# Patient Record
Sex: FEMALE | Race: WHITE | NOT HISPANIC OR LATINO | Employment: FULL TIME | URBAN - NONMETROPOLITAN AREA
[De-identification: names, ages, dates, MRNs, and addresses within clinical notes are randomized per-mention and may not be internally consistent; named-entity substitution may affect disease eponyms.]

---

## 2017-02-21 ENCOUNTER — OFFICE VISIT (OUTPATIENT)
Dept: FAMILY MEDICINE | Facility: OTHER | Age: 52
End: 2017-02-21
Payer: COMMERCIAL

## 2017-02-21 VITALS
WEIGHT: 194.8 LBS | BODY MASS INDEX: 31.31 KG/M2 | SYSTOLIC BLOOD PRESSURE: 118 MMHG | OXYGEN SATURATION: 96 % | DIASTOLIC BLOOD PRESSURE: 68 MMHG | HEIGHT: 66 IN | RESPIRATION RATE: 16 BRPM | TEMPERATURE: 98 F | HEART RATE: 84 BPM

## 2017-02-21 DIAGNOSIS — J40 BRONCHITIS: Primary | ICD-10-CM

## 2017-02-21 PROCEDURE — 99213 OFFICE O/P EST LOW 20 MIN: CPT | Performed by: FAMILY MEDICINE

## 2017-02-21 RX ORDER — AMOXICILLIN 500 MG/1
500 CAPSULE ORAL 3 TIMES DAILY
Qty: 30 CAPSULE | Refills: 0 | Status: SHIPPED | OUTPATIENT
Start: 2017-02-21 | End: 2017-04-28

## 2017-02-21 ASSESSMENT — PAIN SCALES - GENERAL: PAINLEVEL: MODERATE PAIN (4)

## 2017-02-21 NOTE — NURSING NOTE
"Chief Complaint   Patient presents with     Sinus Problem       Initial /68 (BP Location: Left arm, Patient Position: Chair, Cuff Size: Adult Large)  Pulse 84  Temp 98  F (36.7  C) (Temporal)  Resp 16  Ht 5' 6\" (1.676 m)  Wt 194 lb 12.8 oz (88.4 kg)  SpO2 96%  BMI 31.44 kg/m2 Estimated body mass index is 31.44 kg/(m^2) as calculated from the following:    Height as of this encounter: 5' 6\" (1.676 m).    Weight as of this encounter: 194 lb 12.8 oz (88.4 kg).  Medication Reconciliation: complete   SUBJECTIVE:                                                    Brittnee Herron is a 51 year old female who presents to clinic today for the following health issues:    Acute Illness   Acute illness concerns: Sinus   Onset: 4 days    Fever: no    Chills/Sweats: YES    Headache (location?): YES    Sinus Pressure:YES    Conjunctivitis:  YES: both    Ear Pain: no    Rhinorrhea: YES    Congestion: YES    Sore Throat: YES     Cough: YES    Wheeze: no    Decreased Appetite: yes    Nausea: YES    Vomiting: no    Diarrhea:  no    Dysuria/Freq.: no    Fatigue/Achiness: YES    Sick/Strep Exposure: YES     Therapies Tried and outcome: Sudafed      Problem list and histories reviewed & adjusted, as indicated.    ENT/MOUTH: rhinorrhea-clear, sinus pressure and sore throat  RESP:cough-productive  CV: NEGATIVE for chest pain, palpitations or peripheral edema  MUSCULOSKELETAL: myalgia    OBJECTIVE:                                                    /68 (BP Location: Left arm, Patient Position: Chair, Cuff Size: Adult Large)  Pulse 84  Temp 98  F (36.7  C) (Temporal)  Resp 16  Ht 5' 6\" (1.676 m)  Wt 194 lb 12.8 oz (88.4 kg)  SpO2 96%  BMI 31.44 kg/m2  Body mass index is 31.44 kg/(m^2).    See dictated note     ASSESSMENT/PLAN:                                                    bronchitis    Ho Way MD, MD  [facility]            "

## 2017-02-21 NOTE — PROGRESS NOTES
"Nursing Notes:   Kirsty Christie LPN  2/21/2017  3:50 PM  Incomplete  Chief Complaint   Patient presents with     Sinus Problem       Initial /68 (BP Location: Left arm, Patient Position: Chair, Cuff Size: Adult Large)  Pulse 84  Temp 98  F (36.7  C) (Temporal)  Resp 16  Ht 5' 6\" (1.676 m)  Wt 194 lb 12.8 oz (88.4 kg)  SpO2 96%  BMI 31.44 kg/m2 Estimated body mass index is 31.44 kg/(m^2) as calculated from the following:    Height as of this encounter: 5' 6\" (1.676 m).    Weight as of this encounter: 194 lb 12.8 oz (88.4 kg).  Medication Reconciliation: complete   SUBJECTIVE:                                                    Brittnee Herron is a 51 year old female who presents to clinic today for the following health issues:    Acute Illness   Acute illness concerns: Sinus   Onset: 4 days    Fever: no    Chills/Sweats: YES    Headache (location?): YES    Sinus Pressure:YES    Conjunctivitis:  YES: both    Ear Pain: no    Rhinorrhea: YES    Congestion: YES    Sore Throat: YES     Cough: YES    Wheeze: no    Decreased Appetite: yes    Nausea: YES    Vomiting: no    Diarrhea:  no    Dysuria/Freq.: no    Fatigue/Achiness: YES    Sick/Strep Exposure: YES     Therapies Tried and outcome: Sudafed      Problem list and histories reviewed & adjusted, as indicated.    ENT/MOUTH: rhinorrhea-clear, sinus pressure and sore throat  RESP:cough-productive  CV: NEGATIVE for chest pain, palpitations or peripheral edema  MUSCULOSKELETAL: myalgia    OBJECTIVE:                                                    /68 (BP Location: Left arm, Patient Position: Chair, Cuff Size: Adult Large)  Pulse 84  Temp 98  F (36.7  C) (Temporal)  Resp 16  Ht 5' 6\" (1.676 m)  Wt 194 lb 12.8 oz (88.4 kg)  SpO2 96%  BMI 31.44 kg/m2  Body mass index is 31.44 kg/(m^2).    See dictated note     ASSESSMENT/PLAN:                                                    bronchitis    Ho Way MD, MD  [facility]          "

## 2017-02-21 NOTE — PROGRESS NOTES
SUBJECTIVE:  Ms. Eliseo Otoole is a 51-year-old woman, comes in having been ill for about 4 days with chills, body aches, headache, runny nose, eye irritation, some cough and mild sore throat.  She is rarely ill, but says she gets this about once a year.  Her  has just been in with similar symptoms and was treated with amoxicillin with almost immediate improvement.  She is wondering if she has a sinus infection.      OBJECTIVE:   HEENT:  Ears are free of wax and appear normal.  Throat does not look red or inflamed.  There is no adenopathy.   LUNGS:  Sound quite clear.      ASSESSMENT AND PLAN:  This would appear to be an upper respiratory bronchitis problem.  The question is whether it is bacterial or viral.  She rarely gets antibiotics.  Her  has improved with amoxicillin.  We will treat her with amoxicillin, anticipating improvement.  She may want to get some over-the-counter Mucinex.         GORDO WU M.D.             D: 2017 16:14   T: 2017 17:48   MT: LISA#[id number]      Name:     ELISEO OTOOLE   MRN:      [id number]        Account:      [id number]   :      1965           Visit Date:   2017      Document: [id number]

## 2017-02-21 NOTE — MR AVS SNAPSHOT
"              After Visit Summary   2/21/2017    Brittnee Herron    MRN: [id number]           Patient Information     Date Of Birth          1965        Visit Information        Provider Department      2/21/2017 3:15 PM Ho Way MD [facility]        Today's Diagnoses     Bronchitis    -  1       Follow-ups after your visit        Your next 10 appointments already scheduled     Feb 27, 2017  3:45 PM CST   PHYSICAL with Ho Way MD   [facility] ([facility])    [address] [facility] [address] [phone]              Who to contact     If you have questions or need follow up information about today's clinic visit or your schedule please contact [facility] directly at [phone].  Normal or non-critical lab and imaging results will be communicated to you by MyChart, letter or phone within 4 business days after the clinic has received the results. If you do not hear from us within 7 days, please contact the clinic through MyChart or phone. If you have a critical or abnormal lab result, we will notify you by phone as soon as possible.  Submit refill requests through [employer] or call your pharmacy and they will forward the refill request to us. Please allow 3 business days for your refill to be completed.          Additional Information About Your Visit        MyChart Information     [employer] lets you send messages to your doctor, view your test results, renew your prescriptions, schedule appointments and more. To sign up, go to [email or web address] . Click on \"Log in\" on the left side of the screen, which will take you to the Welcome page. Then click on \"Sign up Now\" on the right side of the page.     You will be asked to enter the access code listed below, as well as some personal information. Please follow the directions to create your username and password.     Your access code is: [email or web address]  Expires: 5/22/2017  " "4:08 PM     Your access code will  in 90 days. If you need help or a new code, please call your [facility] or [phone].        Care EveryWhere ID     This is your Care EveryWhere ID. This could be used by other organizations to access your [place] medical records  [id number]        Your Vitals Were     Pulse Temperature Respirations Height Pulse Oximetry BMI (Body Mass Index)    84 98  F (36.7  C) (Temporal) 16 5' 6\" (1.676 m) 96% 31.44 kg/m2       Blood Pressure from Last 3 Encounters:   17 118/68   16 126/88   12/28/15 136/84    Weight from Last 3 Encounters:   17 194 lb 12.8 oz (88.4 kg)   16 202 lb 3.2 oz (91.7 kg)   12/28/15 202 lb 4.8 oz (91.8 kg)              Today, you had the following     No orders found for display         Today's Medication Changes          These changes are accurate as of: 17  4:08 PM.  If you have any questions, ask your nurse or doctor.               Start taking these medicines.        Dose/Directions    amoxicillin 500 MG capsule   Commonly known as:  AMOXIL   Used for:  Bronchitis   Started by:  Ho Way MD        Dose:  500 mg   Take 1 capsule (500 mg) by mouth 3 times daily   Quantity:  30 capsule   Refills:  0            Where to get your medicines      These medications were sent to [facility] [address]   [address] [facility] [address]     Phone:  [phone]     amoxicillin 500 MG capsule                Primary Care Provider Office Phone # Fax #    Ho Way [phone]       [facility] [address] [facility] [address]        Thank you!     Thank you for choosing [facility]  for your care. Our goal is always to provide you with excellent care. Hearing back from our patients is one way we can continue to improve our services. Please take a few minutes to complete the written survey that you may receive in the mail after " your visit with us. Thank you!             Your Updated Medication List - Protect others around you: Learn how to safely use, store and throw away your medicines at www.disposemymeds.org.          This list is accurate as of: 2/21/17  4:08 PM.  Always use your most recent med list.                   Brand Name Dispense Instructions for use    amoxicillin 500 MG capsule    AMOXIL    30 capsule    Take 1 capsule (500 mg) by mouth 3 times daily       citalopram 20 MG tablet    celeXA    90 tablet    Take 1 tablet (20 mg) by mouth daily

## 2017-04-28 ENCOUNTER — OFFICE VISIT (OUTPATIENT)
Dept: FAMILY MEDICINE | Facility: OTHER | Age: 52
End: 2017-04-28
Payer: COMMERCIAL

## 2017-04-28 ENCOUNTER — HOSPITAL ENCOUNTER (OUTPATIENT)
Dept: ULTRASOUND IMAGING | Facility: CLINIC | Age: 52
Discharge: HOME | End: 2017-04-28
Attending: FAMILY MEDICINE | Admitting: FAMILY MEDICINE
Payer: COMMERCIAL

## 2017-04-28 VITALS
RESPIRATION RATE: 20 BRPM | HEART RATE: 64 BPM | WEIGHT: 193.5 LBS | DIASTOLIC BLOOD PRESSURE: 86 MMHG | TEMPERATURE: 98.5 F | BODY MASS INDEX: 31.23 KG/M2 | SYSTOLIC BLOOD PRESSURE: 130 MMHG

## 2017-04-28 DIAGNOSIS — F33.0 MILD RECURRENT MAJOR DEPRESSION (H): Primary | ICD-10-CM

## 2017-04-28 DIAGNOSIS — M79.662 PAIN OF LEFT CALF: ICD-10-CM

## 2017-04-28 PROCEDURE — 93971 EXTREMITY STUDY: CPT | Mod: LT

## 2017-04-28 PROCEDURE — 99213 OFFICE O/P EST LOW 20 MIN: CPT | Performed by: FAMILY MEDICINE

## 2017-04-28 ASSESSMENT — PAIN SCALES - GENERAL: PAINLEVEL: EXTREME PAIN (9)

## 2017-04-28 NOTE — PROGRESS NOTES
SUBJECTIVE:                                                    Brittnee Herron is a 51 year old female who presents to clinic today for the following health issues:      Leg pain     Onset: 1 week    Description:   Location: pain going from lower leg to upper leg  Character: Cramping    Intensity: severe    Progression of Symptoms: worse    Accompanying Signs & Symptoms:  Other symptoms: radiation of pain to lower leg to upper leg, weakness with walking, warmth and swelling   History:   Previous similar pain: no       Precipitating factors:   Trauma or overuse: YES- fall last summer    Alleviating factors:  Improved by: nothing       Therapies Tried and outcome: Heat, stretching, ice and no relief.         Problem list and histories reviewed & adjusted, as indicated.  Additional history: She denies any injury, prior DVT, prolonged travel or immobilization.     Patient Active Problem List   Diagnosis     Rhinitis, allergic seasonal     Mild recurrent major depression (H)     Past Surgical History:   Procedure Laterality Date     C LIGATE FALLOPIAN TUBE,POSTPARTUM  08/03/2002    Postpartum bilateral tubal ligation with partial salpingectomy through a mini laparotomy     HC REMOVAL OF OVARIAN CYST(S)  1990    laparoscopic     HC REMOVE TONSILS/ADENOIDS,<13 Y/O         Social History   Substance Use Topics     Smoking status: Never Smoker     Smokeless tobacco: Never Used     Alcohol use No     Family History   Problem Relation Age of Onset     Arthritis Mother      Hypertension Mother      Alcohol/Drug Maternal Grandmother      alcohol     Alcohol/Drug Maternal Grandfather      alcohol     DIABETES Maternal Grandfather      adult-onset     Depression Brother          Current Outpatient Prescriptions   Medication Sig Dispense Refill     citalopram (CELEXA) 20 MG tablet Take 1 tablet (20 mg) by mouth daily 90 tablet 1     Allergies   Allergen Reactions     No Known Drug Allergies      Recent Labs   Lab Test   07/14/15   0947  06/03/13   1731  04/21/12   1147   LDL  113  80   --    HDL  67   --    --    TRIG  86   --    --    ALT  28   --   22   CR  0.58   --   0.57   GFRESTIMATED  >90  Non  GFR Calc     --   >90   GFRESTBLACK  >90   GFR Calc     --   >90   POTASSIUM  4.1   --   3.7   TSH  4.60*   --    --       BP Readings from Last 3 Encounters:   04/28/17 130/90   02/21/17 118/68   09/19/16 126/88    Wt Readings from Last 3 Encounters:   04/28/17 193 lb 8 oz (87.8 kg)   02/21/17 194 lb 12.8 oz (88.4 kg)   09/19/16 202 lb 3.2 oz (91.7 kg)                  Labs reviewed in EPIC    Reviewed and updated as needed this visit by clinical staff  Tobacco  Meds       Reviewed and updated as needed this visit by Provider         ROS:  C: NEGATIVE for fever, chills, change in weight  E/M: NEGATIVE for ear, mouth and throat problems  R: NEGATIVE for significant cough or SOB  CV: NEGATIVE for chest pain, palpitations or peripheral edema  MUSCULOSKELETAL: POSITIVE  for pain left calf and popliteal carli and muscle spasm same and NEGATIVE for Hx DVT, joint swelling and joint warmth  ROS otherwise negative    OBJECTIVE:                                                    /90 (BP Location: Left arm, Patient Position: Chair, Cuff Size: Adult Large)  Pulse 64  Temp 98.5  F (36.9  C) (Tympanic)  Resp 20  Wt 193 lb 8 oz (87.8 kg)  LMP 04/21/2017  BMI 31.23 kg/m2  Body mass index is 31.23 kg/(m^2).  GENERAL: healthy, alert and no distress  NECK: no adenopathy, no asymmetry, masses, or scars and thyroid normal to palpation  RESP: lungs clear to auscultation - no rales, rhonchi or wheezes  CV: regular rate and rhythm, normal S1 S2, no S3 or S4, no murmur, click or rub, no peripheral edema and peripheral pulses strong  ABDOMEN: soft, nontender, no hepatosplenomegaly, no masses and bowel sounds normal  MS: LLE exam shows normal strength and muscle mass, no deformities, no erythema, induration, or  nodules, no evidence of joint effusion, ROM of all joints is normal and tenderness of popliteal fossa without mass. Equivocal Zuly sign.  Diagnostic Test Results:  Xray - US LLE.     ASSESSMENT/PLAN:                                                      1. Mild recurrent major depression (H)  Chronic controlled. The current medical regimen is effective;  continue present plan and medications.    2. Pain of left calf  Most likely calf strain vs DVT. Will obtain LLE US to exclude DVT. If negative then will refer to PT. If positive will initiate anticoagulation with Xarelto.  - US Lower Extremity Venous Duplex Left; Future      Judson Laird MD  [facility]

## 2017-04-28 NOTE — NURSING NOTE
"Chief Complaint   Patient presents with     Musculoskeletal Problem      left leg pain for x 1 week       Initial /90 (BP Location: Left arm, Patient Position: Chair, Cuff Size: Adult Large)  Pulse 64  Temp 98.5  F (36.9  C) (Tympanic)  Resp 20  Wt 193 lb 8 oz (87.8 kg)  LMP 04/21/2017  BMI 31.23 kg/m2 Estimated body mass index is 31.23 kg/(m^2) as calculated from the following:    Height as of 2/21/17: 5' 6\" (1.676 m).    Weight as of this encounter: 193 lb 8 oz (87.8 kg).  Medication Reconciliation: complete     Alannah Burrell MA 4/28/2017        "

## 2017-04-28 NOTE — MR AVS SNAPSHOT
"              After Visit Summary   4/28/2017    Brittnee Herron    MRN: [id number]           Patient Information     Date Of Birth          1965        Visit Information        Provider Department      4/28/2017 4:10 PM Judson Laird MD [facility]        Today's Diagnoses     Mild recurrent major depression (H)    -  1    Pain of left calf           Follow-ups after your visit        Follow-up notes from your care team     Return if symptoms worsen or fail to improve.      Future tests that were ordered for you today     Open Future Orders        Priority Expected Expires Ordered    US Lower Extremity Venous Duplex Left Routine  4/28/2018 4/28/2017            Who to contact     If you have questions or need follow up information about today's clinic visit or your schedule please contact [facility] directly at [phone].  Normal or non-critical lab and imaging results will be communicated to you by MyChart, letter or phone within 4 business days after the clinic has received the results. If you do not hear from us within 7 days, please contact the clinic through MyChart or phone. If you have a critical or abnormal lab result, we will notify you by phone as soon as possible.  Submit refill requests through [employer] or call your pharmacy and they will forward the refill request to us. Please allow 3 business days for your refill to be completed.          Additional Information About Your Visit        MyChart Information     [employer] lets you send messages to your doctor, view your test results, renew your prescriptions, schedule appointments and more. To sign up, go to [email or web address] . Click on \"Log in\" on the left side of the screen, which will take you to the Welcome page. Then click on \"Sign up Now\" on the right side of the page.     You will be asked to enter the access code listed below, as well as some personal information. Please follow the directions to create your " username and password.     Your access code is: [email or web address]  Expires: 2017  5:08 PM     Your access code will  in 90 days. If you need help or a new code, please call your [facility] or [phone].        Care EveryWhere ID     This is your Care EveryWhere ID. This could be used by other organizations to access your [place] medical records  [id number]        Your Vitals Were     Pulse Temperature Respirations Last Period BMI (Body Mass Index)       64 98.5  F (36.9  C) (Tympanic) 20 2017 31.23 kg/m2        Blood Pressure from Last 3 Encounters:   17 130/90   17 118/68   16 126/88    Weight from Last 3 Encounters:   17 193 lb 8 oz (87.8 kg)   17 194 lb 12.8 oz (88.4 kg)   16 202 lb 3.2 oz (91.7 kg)               Primary Care Provider Office Phone # Fax #    Ho Way [phone]       [facility] [address] [facility] [address]        Thank you!     Thank you for choosing [facility]  for your care. Our goal is always to provide you with excellent care. Hearing back from our patients is one way we can continue to improve our services. Please take a few minutes to complete the written survey that you may receive in the mail after your visit with us. Thank you!             Your Updated Medication List - Protect others around you: Learn how to safely use, store and throw away your medicines at www.disposemymeds.org.          This list is accurate as of: 17  4:45 PM.  Always use your most recent med list.                   Brand Name Dispense Instructions for use    citalopram 20 MG tablet    celeXA    90 tablet    Take 1 tablet (20 mg) by mouth daily

## 2017-05-09 ENCOUNTER — TELEPHONE (OUTPATIENT)
Dept: FAMILY MEDICINE | Facility: OTHER | Age: 52
End: 2017-05-09

## 2017-05-09 NOTE — TELEPHONE ENCOUNTER
Panel Management Review      Patient has the following on her problem list:     Depression / Dysthymia review  PHQ-9 SCORE 7/13/2015 12/28/2015 9/19/2016   Total Score 13 - -   Total Score - 4 4      Patient is due for:  PHQ9      Composite cancer screening  Chart review shows that this patient is due/due soon for the following Mammogram and Colonoscopy  Summary:    Patient is due/failing the following:   MAMMOGRAM, PAP and PHQ9    Action needed:   Patient needs to do PHQ9.    Type of outreach:    Phone, left message for patient to call back.     Questions for provider review:    None                                                                                                                                    Nava Spence MA     5/9/2017       Chart routed to none   .

## 2017-05-16 ENCOUNTER — HOSPITAL ENCOUNTER (OUTPATIENT)
Dept: PHYSICAL THERAPY | Facility: OTHER | Age: 52
Setting detail: THERAPIES SERIES
End: 2017-05-16
Attending: FAMILY MEDICINE
Payer: COMMERCIAL

## 2017-05-16 PROCEDURE — 97161 PT EVAL LOW COMPLEX 20 MIN: CPT | Mod: GP | Performed by: PHYSICAL THERAPIST

## 2017-05-16 PROCEDURE — 97110 THERAPEUTIC EXERCISES: CPT | Mod: GP | Performed by: PHYSICAL THERAPIST

## 2017-05-16 PROCEDURE — 40000718 ZZHC STATISTIC PT DEPARTMENT ORTHO VISIT: Performed by: PHYSICAL THERAPIST

## 2017-05-16 NOTE — PROGRESS NOTES
05/16/17 1100   General Information   Type of Visit Initial OP Ortho PT Evaluation   Start of Care Date 05/16/17   Referring Physician arlette fraser MD   Patient/Family Goals Statement calf cramps    Orders Evaluate and Treat   Date of Order 04/28/17   Insurance Type (medica )   Medical Diagnosis pain of left calf M79.662   Surgical/Medical history reviewed Yes   Precautions/Limitations no known precautions/limitations   Weight-Bearing Status - LLE full weight-bearing   Weight-Bearing Status - RLE full weight-bearing   Body Part(s)   Body Part(s) Knee   Presentation and Etiology   Pertinent history of current problem (include personal factors and/or comorbidities that impact the POC) patient reports that about 1 month ago started to have cramps in the left calf and has swelling in right knee . has US which is normal . gets yovani horses alot but this time is not going away . PMH none reported , works in kitchen as cook x 4 years . normally walks down driveway 30 minutes last time last week , feels spasm at night    Impairments A. Pain   Symptom Location left knee and calf    Onset date of current episode/exacerbation 04/16/17   Chronicity New   Pain rating (0-10 point scale) Best (/10);Worst (/10)   Best (/10) 5/10   Worst (/10) 7/10   Pain quality A. Sharp;B. Dull;C. Aching   Frequency of pain/symptoms A. Constant   Pain/symptoms are: The same all the time   Pain/symptoms exacerbated by (overdoing walking )   Pain/symptoms eased by H. Cold   Progression of symptoms since onset: Improved   Current / Previous Interventions   Diagnostic Tests: (ultrasound)   Prior Level of Function   Functional Level Prior Comment not alot , just walking    Current Level of Function   Current Community Support Family/friend caregiver   Patient role/employment history A. Employed   Fall Risk Screen   Fall screen completed by PT   Per patient - Fall 2 or more times in past year? No   Per patient - Fall with injury in past year? No    Is patient a fall risk? No   Knee Objective Findings   Side (if bilateral, select both right and left) Left   Observation no significant swelling noted   Knee ROM Comment full AROM    Knee/Hip Strength Comments 4/5   Anterior Drawer Test neg   Posterior Drawer Test neg   Varus Stress Test neg   Valgus Stress Test neg   Pema's Test neg   Knee Special Test Comments tender on hamsting insertion and calf    Left Gastrocnemius Flexibility left 0 right 10   Left Hamstring Flexibility left 20 right 15   Planned Therapy Interventions   Planned Therapy Interventions ROM;strengthening;stretching   Clinical Impression   Criteria for Skilled Therapeutic Interventions Met yes, treatment indicated   PT Diagnosis left calf and knee strain    Functional limitations due to impairments LEFS 30/80   Clinical Presentation Stable/Uncomplicated   Clinical Decision Making (Complexity) Low complexity   Predicted Duration of Therapy Intervention (days/wks) recheck in 2 weeks if doing well continue on HEP    Risk & Benefits of therapy have been explained Yes   Patient, Family & other staff in agreement with plan of care Yes   Education Assessment   Preferred Learning Style Listening;Reading;Demonstration   Barriers to Learning No barriers   ORTHO GOALS   PT Ortho Eval Goals 1;2   Ortho Goal 1   Goal Identifier 1   Goal Description instruction in HEP and compliant with it 5 of 7 days    Target Date 06/16/17   Ortho Goal 2   Goal Identifier 2   Goal Description patient to have overall decrease pain and improved tolerance to activity currently 5-7/10 and LEFS 30/80    Target Date 06/16/17   Total Evaluation Time   Total Evaluation Time 15

## 2017-05-16 NOTE — PROGRESS NOTES
05/16/17 1100   Lower Extremity Functional Scale ( 1996 KATINA Coats: used with permission)   a. Any of your usual work, housework, or school activities. 3-A Little Bit of Difficulty   b. Your usual hobbies, recreational or sporting activities.  2-Moderate Difficulty   c. Getting into or out of the bath. 2-Moderate Difficulty   d. Walking between rooms. 4-No Difficulty   e. Putting on your shoes or socks. 3-A Little Bit of Difficulty   f. Squatting. 2-Moderate Difficulty   g. Lifting an object, like a bag of groceries from the floor.  2-Moderate Difficulty   h. Performing light activities around your home.  3-A Little Bit of Difficulty   i. Performing heavy activities around your home. 2-Moderate Difficulty   j. Getting into or out of a car. 3-A Little Bit of Difficulty   k. Walking 2 blocks. 1-Quite a Bit of Difficulty   l. Walking a mile. 0-Extreme Difficulty or Unable to Perform Activity   m. Going up or down 10 stairs (about 1 flight of stairs). 0-Extreme Difficulty or Unable to Perform Activity   n. Standing for 1 hour. 1-Quite a Bit of Difficulty   o. Sitting for 1 hour. 1-Quite a Bit of Difficulty   p. Running on even ground. 0-Extreme Difficulty or Unable to Perform Activity   q. Running on uneven ground. 0-Extreme Difficulty or Unable to Perform Activity   r. Making sharp turns while running fast. 0-Extreme Difficulty or Unable to Perform Activity   s. Hopping. 0-Extreme Difficulty or Unable to Perform Activity   t. Rolling over in bed. 1-Quite a Bit of Difficulty   SCORE:    Column Totals: /80 30

## 2017-06-06 ENCOUNTER — TELEPHONE (OUTPATIENT)
Dept: FAMILY MEDICINE | Facility: OTHER | Age: 52
End: 2017-06-06

## 2017-06-06 DIAGNOSIS — M79.662 PAIN OF LEFT LOWER LEG: Primary | ICD-10-CM

## 2017-06-06 NOTE — TELEPHONE ENCOUNTER
Reason for Call: Request for an order or referral:    Order or referral being requested: referral to Ortho    Date needed: at your convenience    Has the patient been seen by the PCP for this problem? YES    Additional comments: pt stated her leg is not getting better. Would like to see Ortho. Pt is wondering if she needs to [facility] first?    Phone number Patient can be reached at:  Home number on file [phone] (home)    Best Time:  anytime    Can we leave a detailed message on this number?  YES    Call taken on 6/6/2017 at 2:52 PM by Cassie Phoenix

## 2017-06-07 ENCOUNTER — OFFICE VISIT (OUTPATIENT)
Dept: ORTHOPEDICS | Facility: CLINIC | Age: 52
End: 2017-06-07
Attending: FAMILY MEDICINE
Payer: COMMERCIAL

## 2017-06-07 ENCOUNTER — RADIANT APPOINTMENT (OUTPATIENT)
Dept: GENERAL RADIOLOGY | Facility: CLINIC | Age: 52
End: 2017-06-07
Attending: ORTHOPAEDIC SURGERY
Payer: COMMERCIAL

## 2017-06-07 VITALS — TEMPERATURE: 98 F | BODY MASS INDEX: 31.5 KG/M2 | HEIGHT: 66 IN | WEIGHT: 196 LBS

## 2017-06-07 DIAGNOSIS — M25.562 LEFT KNEE PAIN: ICD-10-CM

## 2017-06-07 DIAGNOSIS — M65.969 SYNOVITIS OF KNEE: Primary | ICD-10-CM

## 2017-06-07 PROCEDURE — 20610 DRAIN/INJ JOINT/BURSA W/O US: CPT | Mod: LT | Performed by: ORTHOPAEDIC SURGERY

## 2017-06-07 PROCEDURE — 73562 X-RAY EXAM OF KNEE 3: CPT | Mod: TC

## 2017-06-07 PROCEDURE — 99244 OFF/OP CNSLTJ NEW/EST MOD 40: CPT | Mod: 25 | Performed by: ORTHOPAEDIC SURGERY

## 2017-06-07 RX ORDER — MELOXICAM 15 MG/1
15 TABLET ORAL DAILY
Qty: 90 TABLET | Refills: 1 | Status: SHIPPED | OUTPATIENT
Start: 2017-06-07 | End: 2017-07-03 | Stop reason: ALTCHOICE

## 2017-06-07 ASSESSMENT — PAIN SCALES - GENERAL: PAINLEVEL: MODERATE PAIN (5)

## 2017-06-07 NOTE — NURSING NOTE
"Chief Complaint   Patient presents with     Consult     left knee/leg pain per Dr. Way       Initial Temp 98  F (36.7  C)  Ht 1.676 m (5' 6\")  Wt 88.9 kg (196 lb)  BMI 31.64 kg/m2 Estimated body mass index is 31.64 kg/(m^2) as calculated from the following:    Height as of this encounter: 1.676 m (5' 6\").    Weight as of this encounter: 88.9 kg (196 lb).  Medication Reconciliation: complete    BP completed using cuff size: NA (Not Taken)    Suzy Devi MA      "

## 2017-06-07 NOTE — MR AVS SNAPSHOT
After Visit Summary   6/7/2017    Brittnee Herron    MRN: [id number]           Patient Information     Date Of Birth          1965        Visit Information        Provider Department      6/7/2017 4:10 PM Brittnee Bean MD [facility]        Today's Diagnoses     Synovitis of knee    -  1    Left knee pain          Care Instructions    Encounter Diagnoses   Name Primary?     Left knee pain      Synovitis of knee Yes     Rest, ice and elevate above heart level as needed for pain control  1. The knee is giving you more trouble in the evening.  2. Xrays look great, good bone and no degenerative joint disease  3. We have ordered an MRI for you.  Please call [phone] to schedule your MRI.  You will also need to schedule a follow up visit for the results from your MRI with Dr. Bean.  You may call us at [phone] to schedule this appointment.  Please make this appointment for at least  2-3 days after your MRI.   4. We gave you a knee brace today.  5. We will do exercises at home to start.  6. We feel that you could have synovitis which is an inflammation of the lining of the knee.  7. I ordered Mobic 15mg once a day times 2 weeks, then take as needed.  Stop this medication if you have any abdominal pain with it.  I sent this to your pharmacy.      8. We decided to do a cortisone injection would help today.  9. Follow up with Brittnee Bean MD 2 to 3 days after the MRI is done to go over the results.   Cortisone Instructions:     1. You received an injection of cortisone into your L knee today.  2. The joint(s) may be more painful for the first 1-2 days.  3. We ask you to continue to rest the joint(s) for a few more days before resuming regular activities.  4. Pain Medications you can take (as long as your primary care provider allows these meds and you do not have kidney or liver conditions):  Tylenol  Take 1000 mg by mouth every 6 hours as needed; maximum dose  4000 mg a day  Ibuprofen  600 mg every 6 hours as needed; maximum 2400 mg a day  (OK to take tylenol and ibuprofen at the same time)  5. Rest, ice and elevate as needed for pain control  6. Watch for these signs of infection: redness, swelling, drainage, warmth to touch, increased pain, or fever. Call the clinic or make an appointment to be seen if you think you have an infection.  7. If you are diabetic, make sure you keep a close eye on your blood sugars, they can get elevated with cortisone injections.   8. Sometimes it can take 1-2 weeks for it to reach its full effect.      Cortisone Injections  Cortisone is a type of steroid. It can greatly reduce swelling, redness, and irritation (inflammation) and pain. Being injected with cortisone is simple and doesn t take long. Your doctor may ask you questions about your health. Certain health conditions, such as diabetes, can be affected by cortisone.     Your pain may be relieved by a cortisone injection.   Why have a cortisone injection?  Injecting cortisone can relieve pain for anything from a sports injury to arthritis. Your doctor may suggest an injection if rest, splints, or oral medicine doesn t relieve your pain. Injecting cortisone is simpler than having surgery. And cortisone may provide the lasting pain relief that can help you get out and enjoy life again.  Getting the injection  Your doctor will start by cleaning and occasionally numbing your skin at the injection site. Next you ll be injected with local anesthetics (for short-term pain relief) and cortisone. The injection may last a few moments. A small bandage will be put over the injection site. You ll then be ready to go home.  After your injection  After being injected, make sure you don t injure the treated region. But stay active. Enjoy a walk or some other mild activity. Just be careful not to strain the region that gave you trouble.  The next day  Some people feel more pain after being injected.  This is normal, and it will go away soon. Applying ice for 20 minutes at a time to your injury may reduce the increased pain. Rest for the first day or two. You don t need to stay in bed. But avoid tasks that may strain the injured region.  If you have diabetes  Cortisone injections can cause blood sugar to be increased for several days after the injection. If you have diabetes, you should follow your blood  sugar closely during this time. Follow your regular plan for what to do when your blood sugar is elevated.     4194-5989 [employer]. [address] [place] [address]. All rights reserved. This information is not intended as a substitute for professional medical care. Always follow your healthcare professional's instructions.       Wall Squats for ACL Healing    After you regain muscle control, it s time to build strength. This helps you put full weight on your leg. For best results, warm up and stretch before starting. If your injury is recent, wait until swelling and pain decrease before doing this exercise:    Lean against a wall with your feet hip-width apart. Your feet should be about 18 inches from the wall.    Slowly slide down to a near-sitting position. Don t let your knees go past 90 degrees.    Hold for 10 seconds, then slide back up.    Repeat 5 times.     CAUTION: Do this exercise only if your healthcare provider says it s OK.     7341-3577 [employer]. [address] [place] [address]. All rights reserved. This information is not intended as a substitute for professional medical care. Always follow your healthcare professional's instructions.        Leg and Knee Exercises: Leg Raise    This exercise is designed to stretch and strengthen your knee. Before beginning, read through all the instructions. While exercising, breathe normally and use smooth movements. If you feel any pain, stop the exercise. If pain persists, call your healthcare  provider.  Caution    Don t arch your back.    Don t hunch your shoulders.     Sit on the floor with your_________ leg straight, the other bent.    Tighten the thigh muscles on the top of your straight leg. You should feel the muscles contract. Raise that leg 6-8 inches. Then lower it slowly and steadily to the floor. Relax.    Repeat ______ times.  Do ______ sets a day.    2890-0530 [employer]. [address] [place] [address]. All rights reserved. This information is not intended as a substitute for professional medical care. Always follow your healthcare professional's instructions.        Quad Set for Leg and Knee    This exercise is designed to stretch and strengthen your knee. Before beginning, read through all the instructions. While exercising, breathe normally and use smooth movements. If you feel any pain, stop the exercise. If pain persists, call your healthcare provider.  1.  Sit on the floor with one leg straight, the other bent.  2.  Flex the foot of your straight leg by pointing your toes toward you. Press the back of your knee into the floor while tightening the muscle on the top of your thigh. Hold for ______ seconds. Then relax.  3.  Repeat ______ times. Do ______ sets a day.  Caution    Don t arch your back.    Don t hunch your shoulders.    3992-5593 [employer]. [address] [place] [address]. All rights reserved. This information is not intended as a substitute for professional medical care. Always follow your healthcare professional's instructions.         [employer] and bright box may offer reliable information regarding your diagnosis and treatment plan.    THANK YOU for coming in today. If you receive a survey via [employer] or mail please let us know if there was anything you especially appreciated today or if there is any way we can improve our clinic. We appreciate your input.    GENERAL INFORMATION:  Our hours are:  Monday :     Clinic 7:30  AM-430 PM ([facility])  Tuesday:      Operating Room All Day ([facility])  Wednesday: Clinic 7:30 AM - 11:15 AM ([facility])             Clinic 1:00 PM - 4:00PM ([facility])  Thursday:     Administrative Day  Friday:          Clinic 7:30 AM - 11:15 AM ([facility])            Clinic 1:00 PM - 4:00 PM ([facility])    We are not in the office Thursdays. Therefore non- urgent calls and medical messages received on Thursday will be addressed when we are back in the office on Wednesday. Urgent matters will be reviewed and addressed by one of our partners in the office as needed.    If lab work was done today as part of your evaluation you will generally be contacted via [employer], mail, or phone with the results within 1-5 days. If there is an alarming result we will contact you by phone. Lab results come back at varying times, I generally wait until all labs are resulted before making comments on results. Please note labs are automatically released to [employer] (if you have signed up for it) once available-at times you may see these prior to my having a chance to review them as well.    If you need refills please contact your pharmacist. They will send a refill request to me to review. Please allow 3 business days for us to process all refill requests. All narcotic refills should be handled in the clinic at the time of your visit.            Follow-ups after your visit        Who to contact     If you have questions or need follow up information about today's clinic visit or your schedule please contact [facility] directly at [phone].  Normal or non-critical lab and imaging results will be communicated to you by [employer], letter or phone within 4 business days after the clinic has received the results. If you do not hear from us within 7 days, please contact the clinic through  "[employer] or phone. If you have a critical or abnormal lab result, we will notify you by phone as soon as possible.  Submit refill requests through [employer] or call your pharmacy and they will forward the refill request to us. Please allow 3 business days for your refill to be completed.          Additional Information About Your Visit        [employer] Information     [employer] lets you send messages to your doctor, view your test results, renew your prescriptions, schedule appointments and more. To sign up, go to [email or web address] . Click on \"Log in\" on the left side of the screen, which will take you to the Welcome page. Then click on \"Sign up Now\" on the right side of the page.     You will be asked to enter the access code listed below, as well as some personal information. Please follow the directions to create your username and password.     Your access code is: [email or web address]  Expires: 2017  4:39 PM     Your access code will  in 90 days. If you need help or a new code, please call your [facility] clinic or [phone].        Care EveryWhere ID     This is your Care EveryWhere ID. This could be used by other organizations to access your [facility] medical records  [id number]        Your Vitals Were     Temperature Height BMI (Body Mass Index)             98  F (36.7  C) 1.676 m (5' 6\") 31.64 kg/m2          Blood Pressure from Last 3 Encounters:   17 130/86   17 118/68   16 126/88    Weight from Last 3 Encounters:   17 88.9 kg (196 lb)   17 87.8 kg (193 lb 8 oz)   17 88.4 kg (194 lb 12.8 oz)                 Today's Medication Changes          These changes are accurate as of: 17  4:39 PM.  If you have any questions, ask your nurse or doctor.               Start taking these medicines.        Dose/Directions    meloxicam 15 MG tablet   Commonly known as:  MOBIC   Used for:  Synovitis of knee   Started by:  Brittnee Bean MD        Dose:  15 mg   Take 1 tablet " (15 mg) by mouth daily   Quantity:  90 tablet   Refills:  1            Where to get your medicines      These medications were sent to [facility] [place], [place] - [address]  [address] [facility] [address]     Phone:  [phone]     meloxicam 15 MG tablet                Primary Care Provider Office Phone # Fax #    [address] [phone]       [facility] [address] [facility] [address]        Thank you!     Thank you for choosing [facility]  for your care. Our goal is always to provide you with excellent care. Hearing back from our patients is one way we can continue to improve our services. Please take a few minutes to complete the written survey that you may receive in the mail after your visit with us. Thank you!             Your Updated Medication List - Protect others around you: Learn how to safely use, store and throw away your medicines at www.disposemymeds.org.          This list is accurate as of: 6/7/17  4:39 PM.  Always use your most recent med list.                   Brand Name Dispense Instructions for use    citalopram 20 MG tablet    celeXA    90 tablet    Take 1 tablet (20 mg) by mouth daily       meloxicam 15 MG tablet    MOBIC    90 tablet    Take 1 tablet (15 mg) by mouth daily

## 2017-06-07 NOTE — LETTER
6/7/2017       RE: Brittnee Herron  [address] [facility] [address]           Dear Colleague,    Thank you for referring your patient, Brittnee Herron, to the [facility]. Please see a copy of my visit note below.    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      She is being seen for   Chief Complaints and History of Present Illnesses   Patient presents with     Consult     left knee/leg pain per Dr. Way         History of Present Illness:   Brittnee Herron is a 51 year old female who is seen in consultation at the request of Dr Way.  History of Present illness:  Brittnee presents for evaluation of:  1.) left knee/leg  Onset: June/July of last year  Symptoms brought on by fall last summer.   Location:  left leg.    Character:  stabbing.    Progression of symptoms:  worse.    Previous similar pain: no .   Pain Level:  5/10.   Previous treatments: Heat, stretching, ice and no relief. advil  .  Currently on Blood thinners? no  Diagnosis of Diabetes? No  Fell 1 yr ago injuring left knee and has had intermittent pain since then.  Worse after standing up all day at work using a cane and sleeve.  Sharp pain, difficulties with stairs, swelling.      Patient's past medical, surgical, social and family histories reviewed.     Past Medical History:   Diagnosis Date     Depressive disorder, not elsewhere classified 1982    off meds for a year       Past Surgical History:   Procedure Laterality Date     C LIGATE FALLOPIAN TUBE,POSTPARTUM  08/03/2002    Postpartum bilateral tubal ligation with partial salpingectomy through a mini laparotomy     HC REMOVAL OF OVARIAN CYST(S)  1990    laparoscopic     HC REMOVE TONSILS/ADENOIDS,<11 Y/O         Medications:    Current Outpatient Prescriptions on File Prior to Visit:  citalopram (CELEXA) 20 MG tablet Take 1 tablet (20 mg) by mouth daily     No current  "facility-administered medications on file prior to visit.     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[employer]      Social History Main Topics     Smoking status: Never Smoker     Smokeless tobacco: Never Used     Alcohol use No     Drug use: No     Sexual activity: Yes     Partners: Male     Birth control/ protection: Surgical      Comment: PPBTL       Family History   Problem Relation Age of Onset     Arthritis Mother      Hypertension Mother      Alcohol/Drug Maternal Grandmother      alcohol     Alcohol/Drug Maternal Grandfather      alcohol     DIABETES Maternal Grandfather      adult-onset     Depression Brother        REVIEW OF SYSTEMS  10 point review systems performed otherwise negative as noted as per history of present illness.    Physical Exam:  Vitals: Temp 98  F (36.7  C)  Ht 1.676 m (5' 6\")  Wt 88.9 kg (196 lb)  BMI 31.64 kg/m2  BMI= Body mass index is 31.64 kg/(m^2).    Constitutional: healthy, alert and no acute distress   Psychiatric: mentation appears normal and affect normal/bright  NEURO: no focal deficits  RESP: Normal with easy respirations and no use of accessory muscles to breathe, no audible wheezing or retractions  CV: regular pulse  SKIN: No erythema, rashes, excoriation, or breakdown. No evidence of infection.   JOINT/EXTREMITIES:left Knee Exam: Inspection: AP/lateral alignment normal, small effusion  Tender: inferior pole patella, lateral joint line, medial joint line  Active Range of Motion: decreased flexion, full extension  Strength: normal  Special tests: normal Valgus stress test, normal Varus, negative Lachman's test    GAIT: antalgic  Lymph: no palpable lymph nodes    Diagnostic Modalities:  left knee X-ray: No fracture, dislocation and or lesion. Normal alignment.  Joint space maintained no significant arthritis. No appreciable soft tissue abnormality  Independent visualization of the images was " performed.      Impression: left knee synovitis    Plan:  All of the above pertinent physical exam and imaging modalities findings was reviewed with Brittnee.                                          CONSERVATIVE CARE:  I recommend conservative care for the patient to include NSAIDs, focused self directed physical therapy, steroid injections, activity modifications, bracing . Today I provided or dispensed Mobic prescription, brace- knee, info, hep, mri.                                        INJECTION PROCEDURE:  The patient was counseled about an  injection, including discussion of risks (including infection), contents of the injection, rationale for performing the injection, and expected benefits of the injection. The skin was prepped with alcohol and betadine and then utilizing sterile technique an injection of the left knee joint from the superior lateral approach in the supine position was performed. The injection consisted 1ml of Kenalog (40mg per 1ml) with 8ml 1% lidocaine plain. The patient tolerated the injection well, and there were no complications. The injection site was covered with a Band-Aid. The injection was performed by NIKHIL Rodriguez                                        NSAIDS RISKS:  I have prescribed an antiinflammatory medication.  We discussed that it is the same class of some the common over the counter medications (Ibuprofen, Advil, Motrin, Aleve, Naproxen, and  Naprosyn). I recommend to avoid taking these OTC's medication when taking the medication that I prescribed. This medication should be stopped if having stomach issues, bleeding, high blood pressure and/or chest pain                                                FUTURE PLAN:  On their return if they still have symptoms we will consider physical therapy, To be determined based on the test results..    Return to clinic to discuss test results, or sooner as needed for changes.  Re-x-ray on return: Vero Bean  M.D.    Again, thank you for allowing me to participate in the care of your patient.        Sincerely,              Brittnee Bean MD

## 2017-06-07 NOTE — PROGRESS NOTES
ORTHOPEDIC CONSULT      Chief Complaint: Brittnee Herron is a 51 year old female who works at a [employer] in a [place].      She is being seen for   Chief Complaints and History of Present Illnesses   Patient presents with     Consult     left knee/leg pain per Dr. Way         History of Present Illness:   Brittnee Herron is a 51 year old female who is seen in consultation at the request of Dr Way.  History of Present illness:  Brittnee presents for evaluation of:  1.) left knee/leg  Onset: June/July of last year  Symptoms brought on by fall last summer.   Location:  left leg.    Character:  stabbing.    Progression of symptoms:  worse.    Previous similar pain: no .   Pain Level:  5/10.   Previous treatments: Heat, stretching, ice and no relief. advil  .  Currently on Blood thinners? no  Diagnosis of Diabetes? No  Fell 1 yr ago injuring left knee and has had intermittent pain since then.  Worse after standing up all day at work using a cane and sleeve.  Sharp pain, difficulties with stairs, swelling.      Patient's past medical, surgical, social and family histories reviewed.     Past Medical History:   Diagnosis Date     Depressive disorder, not elsewhere classified 1982    off meds for a year       Past Surgical History:   Procedure Laterality Date     C LIGATE FALLOPIAN TUBE,POSTPARTUM  08/03/2002    Postpartum bilateral tubal ligation with partial salpingectomy through a mini laparotomy     HC REMOVAL OF OVARIAN CYST(S)  1990    laparoscopic     HC REMOVE TONSILS/ADENOIDS,<11 Y/O         Medications:    Current Outpatient Prescriptions on File Prior to Visit:  citalopram (CELEXA) 20 MG tablet Take 1 tablet (20 mg) by mouth daily     No current facility-administered medications on file prior to visit.     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      Social History Main Topics     Smoking status: Never Smoker      "Smokeless tobacco: Never Used     Alcohol use No     Drug use: No     Sexual activity: Yes     Partners: Male     Birth control/ protection: Surgical      Comment: PPBTL       Family History   Problem Relation Age of Onset     Arthritis Mother      Hypertension Mother      Alcohol/Drug Maternal Grandmother      alcohol     Alcohol/Drug Maternal Grandfather      alcohol     DIABETES Maternal Grandfather      adult-onset     Depression Brother        REVIEW OF SYSTEMS  10 point review systems performed otherwise negative as noted as per history of present illness.    Physical Exam:  Vitals: Temp 98  F (36.7  C)  Ht 1.676 m (5' 6\")  Wt 88.9 kg (196 lb)  BMI 31.64 kg/m2  BMI= Body mass index is 31.64 kg/(m^2).    Constitutional: healthy, alert and no acute distress   Psychiatric: mentation appears normal and affect normal/bright  NEURO: no focal deficits  RESP: Normal with easy respirations and no use of accessory muscles to breathe, no audible wheezing or retractions  CV: regular pulse  SKIN: No erythema, rashes, excoriation, or breakdown. No evidence of infection.   JOINT/EXTREMITIES:left Knee Exam: Inspection: AP/lateral alignment normal, small effusion  Tender: inferior pole patella, lateral joint line, medial joint line  Active Range of Motion: decreased flexion, full extension  Strength: normal  Special tests: normal Valgus stress test, normal Varus, negative Lachman's test    GAIT: antalgic  Lymph: no palpable lymph nodes    Diagnostic Modalities:  left knee X-ray: No fracture, dislocation and or lesion. Normal alignment.  Joint space maintained no significant arthritis. No appreciable soft tissue abnormality  Independent visualization of the images was performed.      Impression: left knee synovitis    Plan:  All of the above pertinent physical exam and imaging modalities findings was reviewed with Brittnee.                                          CONSERVATIVE CARE:  I recommend conservative care for the " patient to include NSAIDs, focused self directed physical therapy, steroid injections, activity modifications, bracing . Today I provided or dispensed Mobic prescription, brace- knee, info, hep, mri.                                        INJECTION PROCEDURE:  The patient was counseled about an  injection, including discussion of risks (including infection), contents of the injection, rationale for performing the injection, and expected benefits of the injection. The skin was prepped with alcohol and betadine and then utilizing sterile technique an injection of the left knee joint from the superior lateral approach in the supine position was performed. The injection consisted 1ml of Kenalog (40mg per 1ml) with 8ml 1% lidocaine plain. The patient tolerated the injection well, and there were no complications. The injection site was covered with a Band-Aid. The injection was performed by NIKHIL Rodriguez                                        NSAIDS RISKS:  I have prescribed an antiinflammatory medication.  We discussed that it is the same class of some the common over the counter medications (Ibuprofen, Advil, Motrin, Aleve, Naproxen, and  Naprosyn). I recommend to avoid taking these OTC's medication when taking the medication that I prescribed. This medication should be stopped if having stomach issues, bleeding, high blood pressure and/or chest pain                                                FUTURE PLAN:  On their return if they still have symptoms we will consider physical therapy, To be determined based on the test results..    Return to clinic to discuss test results, or sooner as needed for changes.  Re-x-ray on return: Vero Bean M.D.

## 2017-06-07 NOTE — PATIENT INSTRUCTIONS
Encounter Diagnoses   Name Primary?     Left knee pain      Synovitis of knee Yes     Rest, ice and elevate above heart level as needed for pain control  1. The knee is giving you more trouble in the evening.  2. Xrays look great, good bone and no degenerative joint disease  3. We have ordered an MRI for you.  Please call [phone] to schedule your MRI.  You will also need to schedule a follow up visit for the results from your MRI with Dr. Bean.  You may call us at [phone] to schedule this appointment.  Please make this appointment for at least  2-3 days after your MRI.   4. We gave you a knee brace today.  5. We will do exercises at home to start.  6. We feel that you could have synovitis which is an inflammation of the lining of the knee.  7. I ordered Mobic 15mg once a day times 2 weeks, then take as needed.  Stop this medication if you have any abdominal pain with it.  I sent this to your pharmacy.      8. We decided to do a cortisone injection would help today.  9. Follow up with Brittnee Bean MD 2 to 3 days after the MRI is done to go over the results.   Cortisone Instructions:     1. You received an injection of cortisone into your L knee today.  2. The joint(s) may be more painful for the first 1-2 days.  3. We ask you to continue to rest the joint(s) for a few more days before resuming regular activities.  4. Pain Medications you can take (as long as your primary care provider allows these meds and you do not have kidney or liver conditions):  Tylenol  Take 1000 mg by mouth every 6 hours as needed; maximum dose 4000 mg a day  Ibuprofen  600 mg every 6 hours as needed; maximum 2400 mg a day  (OK to take tylenol and ibuprofen at the same time)  5. Rest, ice and elevate as needed for pain control  6. Watch for these signs of infection: redness, swelling, drainage, warmth to touch, increased pain, or fever. Call the clinic or make an appointment to be seen if you think you have an  infection.  7. If you are diabetic, make sure you keep a close eye on your blood sugars, they can get elevated with cortisone injections.   8. Sometimes it can take 1-2 weeks for it to reach its full effect.      Cortisone Injections  Cortisone is a type of steroid. It can greatly reduce swelling, redness, and irritation (inflammation) and pain. Being injected with cortisone is simple and doesn t take long. Your doctor may ask you questions about your health. Certain health conditions, such as diabetes, can be affected by cortisone.     Your pain may be relieved by a cortisone injection.   Why have a cortisone injection?  Injecting cortisone can relieve pain for anything from a sports injury to arthritis. Your doctor may suggest an injection if rest, splints, or oral medicine doesn t relieve your pain. Injecting cortisone is simpler than having surgery. And cortisone may provide the lasting pain relief that can help you get out and enjoy life again.  Getting the injection  Your doctor will start by cleaning and occasionally numbing your skin at the injection site. Next you ll be injected with local anesthetics (for short-term pain relief) and cortisone. The injection may last a few moments. A small bandage will be put over the injection site. You ll then be ready to go home.  After your injection  After being injected, make sure you don t injure the treated region. But stay active. Enjoy a walk or some other mild activity. Just be careful not to strain the region that gave you trouble.  The next day  Some people feel more pain after being injected. This is normal, and it will go away soon. Applying ice for 20 minutes at a time to your injury may reduce the increased pain. Rest for the first day or two. You don t need to stay in bed. But avoid tasks that may strain the injured region.  If you have diabetes  Cortisone injections can cause blood sugar to be increased for several days after the injection. If you have  diabetes, you should follow your blood  sugar closely during this time. Follow your regular plan for what to do when your blood sugar is elevated.     0117-6477 [employer]. [address] [place] [address]. All rights reserved. This information is not intended as a substitute for professional medical care. Always follow your healthcare professional's instructions.       Wall Squats for ACL Healing    After you regain muscle control, it s time to build strength. This helps you put full weight on your leg. For best results, warm up and stretch before starting. If your injury is recent, wait until swelling and pain decrease before doing this exercise:    Lean against a wall with your feet hip-width apart. Your feet should be about 18 inches from the wall.    Slowly slide down to a near-sitting position. Don t let your knees go past 90 degrees.    Hold for 10 seconds, then slide back up.    Repeat 5 times.     CAUTION: Do this exercise only if your healthcare provider says it s OK.     2387-2503 [employer]. [address] [place] [address]. All rights reserved. This information is not intended as a substitute for professional medical care. Always follow your healthcare professional's instructions.        Leg and Knee Exercises: Leg Raise    This exercise is designed to stretch and strengthen your knee. Before beginning, read through all the instructions. While exercising, breathe normally and use smooth movements. If you feel any pain, stop the exercise. If pain persists, call your healthcare provider.  Caution    Don t arch your back.    Don t hunch your shoulders.     Sit on the floor with your_________ leg straight, the other bent.    Tighten the thigh muscles on the top of your straight leg. You should feel the muscles contract. Raise that leg 6-8 inches. Then lower it slowly and steadily to the floor. Relax.    Repeat ______ times.  Do ______ sets a day.    9710-7946  [employer]. [address] [place] [address]. All rights reserved. This information is not intended as a substitute for professional medical care. Always follow your healthcare professional's instructions.        Quad Set for Leg and Knee    This exercise is designed to stretch and strengthen your knee. Before beginning, read through all the instructions. While exercising, breathe normally and use smooth movements. If you feel any pain, stop the exercise. If pain persists, call your healthcare provider.  1.  Sit on the floor with one leg straight, the other bent.  2.  Flex the foot of your straight leg by pointing your toes toward you. Press the back of your knee into the floor while tightening the muscle on the top of your thigh. Hold for ______ seconds. Then relax.  3.  Repeat ______ times. Do ______ sets a day.  Caution    Don t arch your back.    Don t hunch your shoulders.    3442-7667 [employer]. [address] [place] [address]. All rights reserved. This information is not intended as a substitute for professional medical care. Always follow your healthcare professional's instructions.         [facility] and [facility] may offer reliable information regarding your diagnosis and treatment plan.    THANK YOU for coming in today. If you receive a survey via [employer] or mail please let us know if there was anything you especially appreciated today or if there is any way we can improve our clinic. We appreciate your input.    GENERAL INFORMATION:  Our hours are:  Monday :     Clinic 7:30 AM-430 PM ([facility])  Tuesday:      Operating Room All Day ([facility])  Wednesday: Clinic 7:30 AM - 11:15 AM ([facility])             Clinic 1:00 PM - 4:00PM ([facility])  Thursday:     Administrative Day  Friday:          Clinic 7:30 AM - 11:15 AM ([facility])             Clinic 1:00 PM - 4:00 PM ([facility])    We are not in the office Thursdays. Therefore non- urgent calls and medical messages received on Thursday will be addressed when we are back in the office on Wednesday. Urgent matters will be reviewed and addressed by one of our partners in the office as needed.    If lab work was done today as part of your evaluation you will generally be contacted via [employer], mail, or phone with the results within 1-5 days. If there is an alarming result we will contact you by phone. Lab results come back at varying times, I generally wait until all labs are resulted before making comments on results. Please note labs are automatically released to [employer] (if you have signed up for it) once available-at times you may see these prior to my having a chance to review them as well.    If you need refills please contact your pharmacist. They will send a refill request to me to review. Please allow 3 business days for us to process all refill requests. All narcotic refills should be handled in the clinic at the time of your visit.

## 2017-06-14 NOTE — PROGRESS NOTES
Outpatient Physical Therapy Discharge Note     Patient: Brittnee Herron  : 1965    Beginning/End Dates of Reporting Period:  2017 to 2017    Referring Provider: arlette fraser MD    Therapy Diagnosis: calf pain      Client Self Report:      Objective Measurements:  Objective Measure: pain 5-7/10 LEFS 30/80       Goals:  Goal Identifier 1   Goal Description instruction in HEP and compliant with it 5 of 7 days    Target Date 17   Date Met      Progress:     Goal Identifier 2   Goal Description patient to have overall decrease pain and improved tolerance to activity currently 5-7/10 and LEFS 30/80    Target Date 17   Date Met      Progress:       Progress Toward Goals:   Progress this reporting period: patient was only seen for evaluation and instruction in HEP , she cancelled her follow up on 17 and 17 and as of 2017 she has made no further contact with PT           Plan:  Discharge from therapy.    Discharge:    Reason for Discharge: Patient chooses to discontinue therapy.  Patient has not made expected progress due to interrupted treatment attendance.  Patient has failed to schedule further appointments.    Equipment Issued: none    Discharge Plan: Patient to continue home program.

## 2017-06-21 ENCOUNTER — TELEPHONE (OUTPATIENT)
Dept: ORTHOPEDICS | Facility: CLINIC | Age: 52
End: 2017-06-21

## 2017-06-22 ENCOUNTER — TELEPHONE (OUTPATIENT)
Dept: ORTHOPEDICS | Facility: CLINIC | Age: 52
End: 2017-06-22

## 2017-06-22 DIAGNOSIS — F41.9 ANXIETY: Primary | ICD-10-CM

## 2017-06-22 NOTE — TELEPHONE ENCOUNTER
Reason for Call:  Other call back    Detailed comments: Would like to have a sedative ordered for her for the MRI she will be having on Monday 6-26-17. Pharmacy [facility] in [place].    Phone Number Patient can be reached at: Cell number on file:    Telephone Information:   Mobile [phone]       Best Time: any    Can we leave a detailed message on this number? YES    Call taken on 6/22/2017 at 3:12 PM by Tonja Blanc

## 2017-06-23 RX ORDER — DIAZEPAM 2 MG
2 TABLET ORAL PRN
Qty: 4 TABLET | Refills: 0 | Status: SHIPPED | OUTPATIENT
Start: 2017-06-23 | End: 2017-07-03

## 2017-06-26 ENCOUNTER — HOSPITAL ENCOUNTER (OUTPATIENT)
Dept: MRI IMAGING | Facility: CLINIC | Age: 52
Discharge: HOME | End: 2017-06-26
Attending: PHYSICIAN ASSISTANT | Admitting: PHYSICIAN ASSISTANT
Payer: COMMERCIAL

## 2017-06-26 ENCOUNTER — TELEPHONE (OUTPATIENT)
Dept: FAMILY MEDICINE | Facility: OTHER | Age: 52
End: 2017-06-26

## 2017-06-26 DIAGNOSIS — M65.969 SYNOVITIS OF KNEE: ICD-10-CM

## 2017-06-26 PROCEDURE — 73721 MRI JNT OF LWR EXTRE W/O DYE: CPT | Mod: LT

## 2017-06-26 NOTE — TELEPHONE ENCOUNTER
Reason for Call:  Form, our goal is to have forms completed with 72 hours, however, some forms may require a visit or additional information.    Type of letter, form or note:  work             To Whom It May Concern:    RE:  Brittnee Herron was seen and treated today at our clinic.  Due to a work related injury, she is not able to return to work until she follows up with either an Occupational Therapist or her physician to be cleared to return to work.    Please contact me with any questions or concerns.    Sincerely,        Ho Way MD, MD         Who is the form from?: Brittnee has a MRI scheduled this evening and is requesting a work note that she is unable to work this week. Please call when completed  (if other please explain)    Where did the form come from:     What clinic location was the form placed at?:     Where the form was placed:     What number is listed as a contact on the form?:        Additional comments: Please call when completed     Call taken on 6/26/2017 at 9:05 AM by Leslie Sanders

## 2017-06-27 ENCOUNTER — TELEPHONE (OUTPATIENT)
Dept: ORTHOPEDICS | Facility: CLINIC | Age: 52
End: 2017-06-27

## 2017-06-27 ENCOUNTER — TELEPHONE (OUTPATIENT)
Dept: FAMILY MEDICINE | Facility: OTHER | Age: 52
End: 2017-06-27

## 2017-06-27 DIAGNOSIS — M65.969 SYNOVITIS OF KNEE: Primary | ICD-10-CM

## 2017-06-27 RX ORDER — DICLOFENAC SODIUM 75 MG/1
75 TABLET, DELAYED RELEASE ORAL 2 TIMES DAILY PRN
Qty: 30 TABLET | Refills: 1 | Status: SHIPPED | OUTPATIENT
Start: 2017-06-27 | End: 2017-08-07

## 2017-06-27 NOTE — LETTER
[address] [address]  Tel. [phone]      June 27, 2017      Brittnee Herron  [address] [facility] [address]      Dear Dr. Rayna Beaulieu and I are committed to making sure our patient s receive the best care.     Part of that commitment includes making sure you are receiving your recommended routine health screening. Dr. Way and I have noted that you are currently overdue for your mammogram, colonoscopy and PHQ9 & med check .    If you have had any of these tests at a non-[facility] clinic in the past please let us know so we can update your medical record. You can send us a message via [employer] or call the clinic at the phone number listed above.  If you have not had a mammogram, colonoscopy and PHQ9 & med check  please call the clinic and we will assist you with finding the most convenient time and location. You can also go to [email or web address].    If you are under/uninsured, we recommend you contact the [facility]. They offer both paps and mammograms at no charge or on a sliding fee charge. You can schedule with them at [phone]. Please ask them to send us the results.      Sincerely,    Dr. Way and Kirsty   [address]   [address]  Tel. [phone]  Fax [phone]

## 2017-06-27 NOTE — TELEPHONE ENCOUNTER
I called the patient this AM, to discuss her medications.  She says when she stopped the Mobic she did not notice much of a difference. I was encouraging her to use that medication.  We talked about voltaren as a different med she could take at work.  We would take this in place of the mobic.  She also wanted to know the results of her MRI, I went through that briefly but told her we would go into more detail when she comes back.  Rx was signed and sent to her pharmacy.

## 2017-06-27 NOTE — TELEPHONE ENCOUNTER
Reason for Call:  Medication or medication refill:    Do you use a [facility]?  Name of the pharmacy and phone number for the current request:  [facility] - [place] - [phone]    Name of the medication requested: Pt calling and stating that she is going to be going back to work and would like to know if you could prescribe a non-drowsy pain medication.     Other request: please call pt with any questions. Thank You Cleopatra      Can we leave a detailed message on this number? YES    Phone number patient can be reached at: Home number on file [phone] (home)    Best Time: any    Call taken on 6/27/2017 at 8:32 AM by Cleopatra Manzano

## 2017-06-27 NOTE — TELEPHONE ENCOUNTER
I called pt. We talked for sometime trying to figure out what pt is doing for pain, work etc.  I did make her a f/u appt for MRI results for 7/3/17.  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 She has pain by mid morning so is wondering it there is anything she can get for pain to help her thru this?  She was given Mobic RX, she quit taking this due to feeling she was not getting any help from this. She stopped this several days ago. She has been taking tylenol arthritis 3 tabs q 4hrs, the tabs have 650mg dose in them. I told pt she cannot take that many that she should only be taking 6 tabs daily of this.  She feels she is having more pain and swelling. She does use ice and is wearing her knee brace.  I told pt that NIKHIL Biswas was here today and would review this and I would get back to her with an answer from him about the pain meds. I told her that she could not use narcotics at work and that there may be another option but we would have to wait until I hear back from the PA. OUMOU Calderon

## 2017-06-27 NOTE — TELEPHONE ENCOUNTER
Panel Management Review          Composite cancer screening  Chart review shows that this patient is due/due soon for the following Mammogram and Colonoscopy  Summary:    Patient is due/failing the following:   COLONOSCOPY, MAMMOGRAM and PHQ9    Action needed:   Patient needs office visit for med recheck. and Patient needs to do PHQ9.    Type of outreach:    Phone, left message for patient to call back.  and Sent letter.    Questions for provider review:    None                                                                                                                                     Kirsty Christie LPN    Chart routed to none .

## 2017-07-03 ENCOUNTER — OFFICE VISIT (OUTPATIENT)
Dept: ORTHOPEDICS | Facility: CLINIC | Age: 52
End: 2017-07-03
Payer: COMMERCIAL

## 2017-07-03 ENCOUNTER — TELEPHONE (OUTPATIENT)
Dept: ORTHOPEDICS | Facility: CLINIC | Age: 52
End: 2017-07-03

## 2017-07-03 VITALS — HEIGHT: 66 IN | BODY MASS INDEX: 31.5 KG/M2 | WEIGHT: 196 LBS | TEMPERATURE: 97.4 F

## 2017-07-03 DIAGNOSIS — S83.242A TEAR OF MEDIAL MENISCUS OF LEFT KNEE, CURRENT, UNSPECIFIED TEAR TYPE, INITIAL ENCOUNTER: Primary | ICD-10-CM

## 2017-07-03 PROCEDURE — 99213 OFFICE O/P EST LOW 20 MIN: CPT | Performed by: ORTHOPAEDIC SURGERY

## 2017-07-03 ASSESSMENT — PAIN SCALES - GENERAL: PAINLEVEL: EXTREME PAIN (9)

## 2017-07-03 NOTE — MR AVS SNAPSHOT
After Visit Summary   7/3/2017    Brittnee Herron    MRN: [id number]           Patient Information     Date Of Birth          1965        Visit Information        Provider Department      7/3/2017 3:00 PM Brittnee Bean MD [facility]        Today's Diagnoses     Tear of medial meniscus of left knee, current, unspecified tear type, initial encounter    -  1      Care Instructions    Encounter Diagnosis   Name Primary?     Tear of medial meniscus of left knee, current, unspecified tear type, initial encounter Yes     Rest, ice and elevate above heart level as needed for pain control  1. We can tell from your MRI that you have a medial meniscus tear as well as synovitis and some chondromalacia which just means some irritation of the cartilage and wear and tear.  2. On exam you do seem to be very tender over the medial meniscus area.  3. We know you have tried several medications mobic, voltaren, brace, cane, cortisone injection with no relief.    4. We discussed surgery today in the form of a knee scope.   5. We did a work note for you today.  Surgery Information:   1. Today we discussed surgery, the risks, benefits and alternatives. Risks are infection, bleeding or nerve issues, anesthesia problems. All of these are very rare.  2. The surgery would be a Left Knee Arthroscopy and possible medial menisectomy.  This means trimming up the meniscus and clean up the knee.  You can put your weight on it right after surgery.    3. This surgery is a same day surgery, so you will go home the same day. Plan to have some one drive you home.  4. You will need a Pre Operative History and Physical from [facility] [address] MD, MD or another provider prior to surgery. This needs to be within 30 days prior to surgery. We can help you set this up.  5. We talked about doing the surgery on July 18th.  6. It will take about 30 minutes to do the surgery.  7. You can put weight on it right after  the surgery.  8. Follow up with Brittnee Bean MD on the day of surgery. We will talk to you prior to surgery and answer any questions, but it is ok to call prior to surgery if you any questions he wanted answered sooner.  [employer] and [employer] may offer reliable information regarding your diagnosis and treatment plan.    THANK YOU for coming in today. If you receive a survey via [employer] or mail please let us know if there was anything you especially appreciated today or if there is any way we can improve our clinic. We appreciate your input.    GENERAL INFORMATION:  Our hours are:    Monday :     Clinic 7:30 AM-430 PM ([facility])    Tuesday:      Operating Room All Day ([facility])    Wednesday: Clinic 7:30 AM - 11:15 AM ([facility])             Clinic 1:00 PM - 4:00PM ([facility])    Thursday:     Administrative Day    Friday:          Clinic 7:30 AM - 11:15 AM ([facility])            Clinic 1:00 PM - 4:00 PM ([facility])    We are not in the office Thursdays. Therefore non- urgent calls and medical messages received on Thursday will be addressed when we are back in the office on Wednesday. Urgent matters will be reviewed and addressed by one of our partners in the office as needed.    If lab work was done today as part of your evaluation you will generally be contacted via [employer], mail, or phone with the results within 1-5 days. If there is an alarming result we will contact you by phone. Lab results come back at varying times, I generally wait until all labs are resulted before making comments on results. Please note labs are automatically released to [employer] (if you have signed up for it) once available-at times you may see these prior to my having a chance to review them as well.    If you need refills please contact your pharmacist. They will send a refill request to me  to review. Please allow 3 business days for us to process all refill requests. All narcotic refills should be handled in the clinic at the time of your visit.        Understanding Meniscal Tears    The meniscus is a tough cushion of fibrous tissue called cartilage in the knee joint. It cushions the knee. It absorbs shock and helps spread weight across the knee joint. It also works with other parts of the knee to help keep the joint stable. Injury or aging can cause the meniscus to tear and lead to pain and problems using the knee.   What causes meniscal tears?  A sudden injury can tear the meniscus. This is often because of planting the foot and twisting the knee. Sports such as soccer, football, and basketball are often involved. Repeated actions, such as squatting, may also lead to a tear. Breakdown of the meniscus because of aging can also lead to tears.  Symptoms of meniscal tears  These can include:    Knee pain    Knee swelling    Catching of the knee or inability to straighten the knee    Unstable feeling in the knee  Treatment for meniscal tears  A tear is unlikely to heal on its own. You often will need surgery to repair a tear. In many cases, your healthcare provider will first try treatments to help relieve symptoms. These may include:    Rest the knee. This means avoiding any activity that puts stress on the knee joint. These include kneeling, squatting, jogging, and climbing stairs. In some cases, you may need to use crutches for a time to keep body weight off of the knee joint.    Cold pack. Putting a cold pack on the knee helps reduce pain and swelling.    Knee brace. Bracing the knee helps support it.    Medicine. Prescription and over-the-counter pain medicines can help relieve swelling and pain.    Exercises. Exercises help strengthen the muscles of the leg to help support the knee joint.  If these treatments don t help relieve symptoms or the injury is severe, you may need surgery. This can repair  "the meniscus to relieve symptoms and restore movement.     When to call your healthcare provider  Call your healthcare provider right away if you have any of these:    Fever of 100.4 F (38 C) or higher, or as directed    Pain or swelling that gets worse, including pain in the calf    Numbness or tingling in leg or foot    You suddenly can t put any weight on your leg    Your knee  locks    Date Last Reviewed: 3/10/2016    1628-8992 [employer]. [address] [place] [address]. All rights reserved. This information is not intended as a substitute for professional medical care. Always follow your healthcare professional's instructions.                Follow-ups after your visit        Follow-up notes from your care team     Return in 2 weeks (on 7/18/2017) for surgery.      Who to contact     If you have questions or need follow up information about today's clinic visit or your schedule please contact [facility] directly at [phone].  Normal or non-critical lab and imaging results will be communicated to you by MyChart, letter or phone within 4 business days after the clinic has received the results. If you do not hear from us within 7 days, please contact the clinic through [employer] or phone. If you have a critical or abnormal lab result, we will notify you by phone as soon as possible.  Submit refill requests through [employer] or call your pharmacy and they will forward the refill request to us. Please allow 3 business days for your refill to be completed.          Additional Information About Your Visit        [employer] Information     [employer] lets you send messages to your doctor, view your test results, renew your prescriptions, schedule appointments and more. To sign up, go to [email or web address] . Click on \"Log in\" on the left side of the screen, which will take you to the Welcome page. Then click on \"Sign up Now\" on the right side of the page.     You will be asked " "to enter the access code listed below, as well as some personal information. Please follow the directions to create your username and password.     Your access code is: [email or web address]  Expires: 2017  4:39 PM     Your access code will  in 90 days. If you need help or a new code, please call your [facility] clinic or [phone].        Care EveryWhere ID     This is your Care EveryWhere ID. This could be used by other organizations to access your [facility] medical records  [id number]        Your Vitals Were     Temperature Height BMI (Body Mass Index)             97.4  F (36.3  C) (Temporal) 1.676 m (5' 6\") 31.64 kg/m2          Blood Pressure from Last 3 Encounters:   17 130/86   17 118/68   16 126/88    Weight from Last 3 Encounters:   17 88.9 kg (196 lb)   17 88.9 kg (196 lb)   17 87.8 kg (193 lb 8 oz)              Today, you had the following     No orders found for display         Today's Medication Changes          These changes are accurate as of: 7/3/17  3:20 PM.  If you have any questions, ask your nurse or doctor.               Stop taking these medicines if you haven't already. Please contact your care team if you have questions.     meloxicam 15 MG tablet   Commonly known as:  MOBIC   Stopped by:  Brittnee Bean MD                    Primary Care Provider Office Phone # Fax #    [address] [phone]       [facility] [address] [facility] [address]        Equal Access to Services     [facility] AH: Hadkarin Mcneil, kya patel, qaangel gutierrez. So [facility] [phone].    ATENCIÓN: Si habla español, tiene a shine disposición servicios gratuitos de asistencia lingüística. Llame al [phone].    We comply with applicable federal civil rights laws and [place] laws. We do not discriminate on the basis of race, color, national origin, age, " disability sex, sexual orientation or gender identity.            Thank you!     Thank you for choosing [facility]  for your care. Our goal is always to provide you with excellent care. Hearing back from our patients is one way we can continue to improve our services. Please take a few minutes to complete the written survey that you may receive in the mail after your visit with us. Thank you!             Your Updated Medication List - Protect others around you: Learn how to safely use, store and throw away your medicines at www.disposemymeds.org.          This list is accurate as of: 7/3/17  3:20 PM.  Always use your most recent med list.                   Brand Name Dispense Instructions for use Diagnosis    citalopram 20 MG tablet    celeXA    90 tablet    Take 1 tablet (20 mg) by mouth daily    Mild recurrent major depression (H)       diclofenac 75 MG EC tablet    VOLTAREN    30 tablet    Take 1 tablet (75 mg) by mouth 2 times daily as needed for moderate pain    Synovitis of knee

## 2017-07-03 NOTE — TELEPHONE ENCOUNTER
Surgery Scheduled    Date of Surgery 7/18/17 Time of Surgery   Procedure: left knee arthroscopy and partial medial meniscectomy  Hospital/Surgical Facility: [facility]  Surgeon: Dr. Bean  Type of Anesthesia : General  Pre-op 7/5/17 with Dr. Way  Post op:7/31/17 with Dr. Bean        Surgery packet mailed to patient's home address. Patients instructed to arrive 1 hours prior to surgery. Patient understood and agrees to plan.    Alexandrea Olmstead  Surgery Scheduler

## 2017-07-03 NOTE — PATIENT INSTRUCTIONS
Encounter Diagnosis   Name Primary?     Tear of medial meniscus of left knee, current, unspecified tear type, initial encounter Yes     Rest, ice and elevate above heart level as needed for pain control  1. We can tell from your MRI that you have a medial meniscus tear as well as synovitis and some chondromalacia which just means some irritation of the cartilage and wear and tear.  2. On exam you do seem to be very tender over the medial meniscus area.  3. We know you have tried several medications mobic, voltaren, brace, cane, cortisone injection with no relief.    4. We discussed surgery today in the form of a knee scope.   5. We did a work note for you today.  Surgery Information:   1. Today we discussed surgery, the risks, benefits and alternatives. Risks are infection, bleeding or nerve issues, anesthesia problems. All of these are very rare.  2. The surgery would be a Left Knee Arthroscopy and possible medial menisectomy.  This means trimming up the meniscus and clean up the knee.  You can put your weight on it right after surgery.    3. This surgery is a same day surgery, so you will go home the same day. Plan to have some one drive you home.  4. You will need a Pre Operative History and Physical from [facility] [address] MD, MD or another provider prior to surgery. This needs to be within 30 days prior to surgery. We can help you set this up.  5. We talked about doing the surgery on July 18th.  6. It will take about 30 minutes to do the surgery.  7. You can put weight on it right after the surgery.  8. Follow up with Brittnee Bean MD on the day of surgery. We will talk to you prior to surgery and answer any questions, but it is ok to call prior to surgery if you any questions he wanted answered sooner.  [employer] and [employer] may offer reliable information regarding your diagnosis and treatment plan.    THANK YOU for coming in today. If you receive a survey via [employer] or mail please let us know if  there was anything you especially appreciated today or if there is any way we can improve our clinic. We appreciate your input.    GENERAL INFORMATION:  Our hours are:    Monday :     Clinic 7:30 AM-430 PM ([facility])    Tuesday:      Operating Room All Day ([facility])    Wednesday: Clinic 7:30 AM - 11:15 AM ([facility])             Clinic 1:00 PM - 4:00PM ([facility])    Thursday:     Administrative Day    Friday:          Clinic 7:30 AM - 11:15 AM ([facility])            Clinic 1:00 PM - 4:00 PM ([facility])    We are not in the office Thursdays. Therefore non- urgent calls and medical messages received on Thursday will be addressed when we are back in the office on Wednesday. Urgent matters will be reviewed and addressed by one of our partners in the office as needed.    If lab work was done today as part of your evaluation you will generally be contacted via [email or web address], mail, or phone with the results within 1-5 days. If there is an alarming result we will contact you by phone. Lab results come back at varying times, I generally wait until all labs are resulted before making comments on results. Please note labs are automatically released to [email or web address] (if you have signed up for it) once available-at times you may see these prior to my having a chance to review them as well.    If you need refills please contact your pharmacist. They will send a refill request to me to review. Please allow 3 business days for us to process all refill requests. All narcotic refills should be handled in the clinic at the time of your visit.        Understanding Meniscal Tears    The meniscus is a tough cushion of fibrous tissue called cartilage in the knee joint. It cushions the knee. It absorbs shock and helps spread weight across the knee joint. It also works with other parts of the knee to help keep the  joint stable. Injury or aging can cause the meniscus to tear and lead to pain and problems using the knee.   What causes meniscal tears?  A sudden injury can tear the meniscus. This is often because of planting the foot and twisting the knee. Sports such as soccer, football, and basketball are often involved. Repeated actions, such as squatting, may also lead to a tear. Breakdown of the meniscus because of aging can also lead to tears.  Symptoms of meniscal tears  These can include:    Knee pain    Knee swelling    Catching of the knee or inability to straighten the knee    Unstable feeling in the knee  Treatment for meniscal tears  A tear is unlikely to heal on its own. You often will need surgery to repair a tear. In many cases, your healthcare provider will first try treatments to help relieve symptoms. These may include:    Rest the knee. This means avoiding any activity that puts stress on the knee joint. These include kneeling, squatting, jogging, and climbing stairs. In some cases, you may need to use crutches for a time to keep body weight off of the knee joint.    Cold pack. Putting a cold pack on the knee helps reduce pain and swelling.    Knee brace. Bracing the knee helps support it.    Medicine. Prescription and over-the-counter pain medicines can help relieve swelling and pain.    Exercises. Exercises help strengthen the muscles of the leg to help support the knee joint.  If these treatments don t help relieve symptoms or the injury is severe, you may need surgery. This can repair the meniscus to relieve symptoms and restore movement.     When to call your healthcare provider  Call your healthcare provider right away if you have any of these:    Fever of 100.4 F (38 C) or higher, or as directed    Pain or swelling that gets worse, including pain in the calf    Numbness or tingling in leg or foot    You suddenly can t put any weight on your leg    Your knee  locks    Date Last Reviewed: 3/10/2016     5898-8185 [employer]. [address] [place] [address], [place] [address]. All rights reserved. This information is not intended as a substitute for professional medical care. Always follow your healthcare professional's instructions.

## 2017-07-03 NOTE — PROGRESS NOTES
"Office Visit-Follow up    Chief Complaint: Brittnee Herron is a 51 year old female who is being seen for   Chief Complaint   Patient presents with     Results     f/u left knee MRI done on 6/26/17       History of Present Illness:   Mechanism of Injury: last summer she fell as she was going up steps  Location: left knee medial side  Duration of Pain: since the fall last year but it always gotten much worse.  Rating of Pain: 9 out of 10 now  Pain Quality: sharp at times ache at times  Pain is better with: rest  Pain is worse with: activity or twisting  Treatment so far consists of: patient has tried full Giancarlo education as well as Mobic as well as bracing as well as a cane as well as cortisone injection..   Associated Features: touching and locking and clicking. Patient also has had swelling.  Pain is Limiting: activity, making it difficult to work.  Here to: discuss MRI.  Additional History: no previous injury or surgery to the left knee.    REVIEW OF SYSTEMS  General: negative for, night sweats, dizziness, fatigue  Resp: No shortness of breath and no cough  CV: negative for chest pain, syncope or near-syncope  GI: negative for nausea, vomiting and diarrhea  : negative for dysuria and hematuria  Musculoskeletal: as above  Neurologic: negative for syncope   Hematologic: negative for bleeding disorder    Physical Exam:  Vitals: Temp 97.4  F (36.3  C) (Temporal)  Ht 1.676 m (5' 6\")  Wt 88.9 kg (196 lb)  BMI 31.64 kg/m2  BMI= Body mass index is 31.64 kg/(m^2).  Constitutional: healthy, alert and no acute distress   Psychiatric: mentation appears normal and affect normal/bright  NEURO: no focal deficits, CMS intact left lower extremity  RESP: Normal with easy respirations and no use of accessory muscles to breathe, no audible wheezing or retractions  CV: Calf soft and nontender to palpation, leg warm   SKIN: No erythema, rashes, excoriation, or breakdown. No evidence of infection.   MUSCULOSKELETAL:    INSPECTION " of left knee: No gross deformities, erythema, edema, ecchymosis, atrophy or fasciculations.     PALPATION: No tenderness on palpation of the lateral, anterior and posterior portion of the knee. No increased warmth. Patient does have medial joint line tenderness.    ROM: Extension 5  short, flexion to approximately approximately 85 . All range of motion without catching or locking today however the patient has significant pain with full extension and maximal flexion.      STRENGTH: 5 out of 5 quad and hamstring strength.     SPECIAL TEST: Patient has a negative Lachman's negative drawer sign. Patient's knee is stable to varus and valgus stress at 30  of flexion. Patient has a very positive Pema's.   GAIT: slightly intelligent gait  Lymph: no palpable lymph nodes      Diagnostic Modalities:  Previous imaging reviewed.  Today we review the x-rays or done and 6/7/2017 - 03 views of the right knee. There is no fracture dislocation or tumor or DJD.  We did review the MRI that does show an anterior medial meniscus tear and 3 compartment chondromalacia as well as synovitis.  Independent visualization of the images was performed.      Impression: 1. Left knee medial meniscus tear.    Plan:  All of the above pertinent physical exam and imaging modalities findings was reviewed with Brittnee.                                           ELECTIVE SURGERY:  The patient has attempted conservative treatments that include Voltaren medication, Mobic, cortisone injection, bracing, walking cane yet still continues to have significant issues. These issues include pain at rest, increased pain with activity, gait disturbances, touching locking and swelling. Secondary to these reasons I have offered surgery in the form of left knee arthroscopy and partial medial meniscectomy.    Patient Instructions:   1. We can tell from your MRI that you have a medial meniscus tear as well as synovitis and some chondromalacia which just means some  irritation of the cartilage and wear and tear.  2. On exam you do seem to be very tender over the medial meniscus area.  3. We know you have tried several medications mobic, voltaren, brace, cane, cortisone injection with no relief.    4. We discussed surgery today in the form of a knee scope.   5. We did a work note for you today.  Surgery Information:   1. Today we discussed surgery, the risks, benefits and alternatives. Risks are infection, bleeding or nerve issues, anesthesia problems. All of these are very rare.  2. The surgery would be a Left Knee Arthroscopy and possible medial menisectomy.  This means trimming up the meniscus and clean up the knee.  You can put your weight on it right after surgery.    3. This surgery is a same day surgery, so you will go home the same day. Plan to have some one drive you home.  4. You will need a Pre Operative History and Physical from [facility] Way MD, MD or another provider prior to surgery. This needs to be within 30 days prior to surgery. We can help you set this up.  5. We talked about doing the surgery on .  6. It will take about 30 minutes to do the surgery.  7. You can put weight on it right after the surgery.  8. Follow up with Brittnee Bean MD on the day of surgery. We will talk to you prior to surgery and answer any questions, but it is ok to call prior to surgery if you any questions he wanted answered sooner.  Re-x-ray on return: No    Scribed by Armando Sharpe PA-C on 7/3/2017 at 2:54 PM, based on Dr. Brittnee Bean's statements to me.    This note was dictated with [employer].    MERVIN Biswas MD     Please schedule for surgery, pre op H&P, and post ops.      Patient Name:  Brittnee Herron ([id number]).  :  1965  Gender:  female  Patient Type:  Same Day Surgery  Surgeon:  Brittnee Bean MD  Physician requests assist from:  [place]    Procedures:    left Knee Arthroscopy and Partial Medial  Menisectomy  Approach:  NA  Diagnosis:  Tear of medial meniscus of left knee, current, unspecified tear type, initial encounter  C-arm:  No  Mini C-arm:   No  Pathology Scheduled:  No  Special instruments/supplies:  Arthroscopy Instraments  Vendor Rep:  No  Anesthesia:  General  Block:  No   Block type: NA  Time needed:  60 minutes      Post op 1:

## 2017-07-03 NOTE — LETTER
"      7/3/2017         RE: Brittnee Herron  [id number] [facility] [address]        Dear Colleague,    Thank you for referring your patient, Brittnee Herron, to the [facility]. Please see a copy of my visit note below.    Office Visit-Follow up    Chief Complaint: Brittnee Herron is a 51 year old female who is being seen for   Chief Complaint   Patient presents with     Results     f/u left knee MRI done on 6/26/17       History of Present Illness:   Mechanism of Injury: last summer she fell as she was going up steps  Location: left knee medial side  Duration of Pain: since the fall last year but it always gotten much worse.  Rating of Pain: 9 out of 10 now  Pain Quality: sharp at times ache at times  Pain is better with: rest  Pain is worse with: activity or twisting  Treatment so far consists of: patient has tried full Giancarlo education as well as Mobic as well as bracing as well as a cane as well as cortisone injection..   Associated Features: touching and locking and clicking. Patient also has had swelling.  Pain is Limiting: activity, making it difficult to work.  Here to: discuss MRI.  Additional History: no previous injury or surgery to the left knee.    REVIEW OF SYSTEMS  General: negative for, night sweats, dizziness, fatigue  Resp: No shortness of breath and no cough  CV: negative for chest pain, syncope or near-syncope  GI: negative for nausea, vomiting and diarrhea  : negative for dysuria and hematuria  Musculoskeletal: as above  Neurologic: negative for syncope   Hematologic: negative for bleeding disorder    Physical Exam:  Vitals: Temp 97.4  F (36.3  C) (Temporal)  Ht 1.676 m (5' 6\")  Wt 88.9 kg (196 lb)  BMI 31.64 kg/m2  BMI= Body mass index is 31.64 kg/(m^2).  Constitutional: healthy, alert and no acute distress   Psychiatric: mentation appears normal and affect normal/bright  NEURO: no focal deficits, CMS intact left lower extremity  RESP: Normal with easy respirations " and no use of accessory muscles to breathe, no audible wheezing or retractions  CV: Calf soft and nontender to palpation, leg warm   SKIN: No erythema, rashes, excoriation, or breakdown. No evidence of infection.   MUSCULOSKELETAL:    INSPECTION of left knee: No gross deformities, erythema, edema, ecchymosis, atrophy or fasciculations.     PALPATION: No tenderness on palpation of the lateral, anterior and posterior portion of the knee. No increased warmth. Patient does have medial joint line tenderness.    ROM: Extension 5  short, flexion to approximately approximately 85 . All range of motion without catching or locking today however the patient has significant pain with full extension and maximal flexion.      STRENGTH: 5 out of 5 quad and hamstring strength.     SPECIAL TEST: Patient has a negative Lachman's negative drawer sign. Patient's knee is stable to varus and valgus stress at 30  of flexion. Patient has a very positive Pema's.   GAIT: slightly intelligent gait  Lymph: no palpable lymph nodes      Diagnostic Modalities:  Previous imaging reviewed.  Today we review the x-rays or done and 6/7/2017 - 03 views of the right knee. There is no fracture dislocation or tumor or DJD.  We did review the MRI that does show an anterior medial meniscus tear and 3 compartment chondromalacia as well as synovitis.  Independent visualization of the images was performed.      Impression: 1. Left knee medial meniscus tear.    Plan:  All of the above pertinent physical exam and imaging modalities findings was reviewed with Brittnee.                                           ELECTIVE SURGERY:  The patient has attempted conservative treatments that include Voltaren medication, Mobic, cortisone injection, bracing, walking cane yet still continues to have significant issues. These issues include pain at rest, increased pain with activity, gait disturbances, touching locking and swelling. Secondary to these reasons I have  offered surgery in the form of left knee arthroscopy and partial medial meniscectomy.    Patient Instructions:   1. We can tell from your MRI that you have a medial meniscus tear as well as synovitis and some chondromalacia which just means some irritation of the cartilage and wear and tear.  2. On exam you do seem to be very tender over the medial meniscus area.  3. We know you have tried several medications mobic, voltaren, brace, cane, cortisone injection with no relief.    4. We discussed surgery today in the form of a knee scope.   5. We did a work note for you today.  Surgery Information:   1. Today we discussed surgery, the risks, benefits and alternatives. Risks are infection, bleeding or nerve issues, anesthesia problems. All of these are very rare.  2. The surgery would be a Left Knee Arthroscopy and possible medial menisectomy.  This means trimming up the meniscus and clean up the knee.  You can put your weight on it right after surgery.    3. This surgery is a same day surgery, so you will go home the same day. Plan to have some one drive you home.  4. You will need a Pre Operative History and Physical from Ho Way MD, MD or another provider prior to surgery. This needs to be within 30 days prior to surgery. We can help you set this up.  5. We talked about doing the surgery on July 18th.  6. It will take about 30 minutes to do the surgery.  7. You can put weight on it right after the surgery.  8. Follow up with Brittnee Bean MD on the day of surgery. We will talk to you prior to surgery and answer any questions, but it is ok to call prior to surgery if you any questions he wanted answered sooner.  Re-x-ray on return: No    Scribed by Armando Sharpe PA-C on 7/3/2017 at 2:54 PM, based on Dr. Brittnee Bean's statements to me.    This note was dictated with [employer].    MERVIN Biswas MD     Please schedule for surgery, pre op H&P, and post  ops.      Patient Name:  Brittnee Herron ([id number]).  :  1965  Gender:  female  Patient Type:  Same Day Surgery  Surgeon:  Brittnee Bean MD  Physician requests assist from:  PA    Procedures:    left Knee Arthroscopy and Partial Medial Menisectomy  Approach:  NA  Diagnosis:  Tear of medial meniscus of left knee, current, unspecified tear type, initial encounter  C-arm:  No  Mini C-arm:   No  Pathology Scheduled:  No  Special instruments/supplies:  Arthroscopy Instraments  Vendor Rep:  No  Anesthesia:  General  Block:  No   Block type: NA  Time needed:  60 minutes      Post op 1:            Again, thank you for allowing me to participate in the care of your patient.        Sincerely,        Brittnee Bean MD

## 2017-07-03 NOTE — NURSING NOTE
"Chief Complaint   Patient presents with     Results     f/u left knee MRI done on 6/26/17       Initial Temp 97.4  F (36.3  C) (Temporal)  Ht 1.676 m (5' 6\")  Wt 88.9 kg (196 lb)  BMI 31.64 kg/m2 Estimated body mass index is 31.64 kg/(m^2) as calculated from the following:    Height as of this encounter: 1.676 m (5' 6\").    Weight as of this encounter: 88.9 kg (196 lb).  Medication Reconciliation: complete   Kilo/ADAMA     "

## 2017-07-03 NOTE — LETTER
Brittnee D[place]emelina  [id number] [address] [address]        July 3, 2017           To Whom It May Concern:    Brittnee Herron  was seen on 7/3/2017.  Please excuse her FROM 7/7/2017  until 7/25/2017.        Sincerely,        Brittnee Bean MD

## 2017-07-05 ENCOUNTER — OFFICE VISIT (OUTPATIENT)
Dept: FAMILY MEDICINE | Facility: OTHER | Age: 52
End: 2017-07-05
Payer: COMMERCIAL

## 2017-07-05 VITALS
WEIGHT: 196 LBS | OXYGEN SATURATION: 98 % | RESPIRATION RATE: 20 BRPM | DIASTOLIC BLOOD PRESSURE: 84 MMHG | BODY MASS INDEX: 31.5 KG/M2 | TEMPERATURE: 97.6 F | HEART RATE: 89 BPM | HEIGHT: 66 IN | SYSTOLIC BLOOD PRESSURE: 128 MMHG

## 2017-07-05 DIAGNOSIS — Z01.818 PREOP GENERAL PHYSICAL EXAM: Primary | ICD-10-CM

## 2017-07-05 LAB
ANION GAP SERPL CALCULATED.3IONS-SCNC: 5 MMOL/L (ref 3–14)
BASOPHILS # BLD AUTO: 0 10E9/L (ref 0–0.2)
BASOPHILS NFR BLD AUTO: 0.2 %
BUN SERPL-MCNC: 13 MG/DL (ref 7–30)
CALCIUM SERPL-MCNC: 8.4 MG/DL (ref 8.5–10.1)
CHLORIDE SERPL-SCNC: 105 MMOL/L (ref 94–109)
CO2 SERPL-SCNC: 28 MMOL/L (ref 20–32)
CREAT SERPL-MCNC: 0.63 MG/DL (ref 0.52–1.04)
DIFFERENTIAL METHOD BLD: NORMAL
EOSINOPHIL # BLD AUTO: 0.1 10E9/L (ref 0–0.7)
EOSINOPHIL NFR BLD AUTO: 2.3 %
ERYTHROCYTE [DISTWIDTH] IN BLOOD BY AUTOMATED COUNT: 13 % (ref 10–15)
GFR SERPL CREATININE-BSD FRML MDRD: ABNORMAL ML/MIN/1.7M2
GLUCOSE SERPL-MCNC: 92 MG/DL (ref 70–99)
HCT VFR BLD AUTO: 42.2 % (ref 35–47)
HGB BLD-MCNC: 13.6 G/DL (ref 11.7–15.7)
LYMPHOCYTES # BLD AUTO: 1.9 10E9/L (ref 0.8–5.3)
LYMPHOCYTES NFR BLD AUTO: 33.4 %
MCH RBC QN AUTO: 29.3 PG (ref 26.5–33)
MCHC RBC AUTO-ENTMCNC: 32.2 G/DL (ref 31.5–36.5)
MCV RBC AUTO: 91 FL (ref 78–100)
MONOCYTES # BLD AUTO: 0.5 10E9/L (ref 0–1.3)
MONOCYTES NFR BLD AUTO: 9.1 %
NEUTROPHILS # BLD AUTO: 3.2 10E9/L (ref 1.6–8.3)
NEUTROPHILS NFR BLD AUTO: 55 %
PLATELET # BLD AUTO: 324 10E9/L (ref 150–450)
POTASSIUM SERPL-SCNC: 4.4 MMOL/L (ref 3.4–5.3)
RBC # BLD AUTO: 4.64 10E12/L (ref 3.8–5.2)
SODIUM SERPL-SCNC: 138 MMOL/L (ref 133–144)
WBC # BLD AUTO: 5.7 10E9/L (ref 4–11)

## 2017-07-05 PROCEDURE — 85025 COMPLETE CBC W/AUTO DIFF WBC: CPT | Performed by: FAMILY MEDICINE

## 2017-07-05 PROCEDURE — 99214 OFFICE O/P EST MOD 30 MIN: CPT | Performed by: FAMILY MEDICINE

## 2017-07-05 PROCEDURE — 36415 COLL VENOUS BLD VENIPUNCTURE: CPT | Performed by: FAMILY MEDICINE

## 2017-07-05 PROCEDURE — 80048 BASIC METABOLIC PNL TOTAL CA: CPT | Performed by: FAMILY MEDICINE

## 2017-07-05 ASSESSMENT — PAIN SCALES - GENERAL: PAINLEVEL: SEVERE PAIN (6)

## 2017-07-05 NOTE — NURSING NOTE
"Chief Complaint   Patient presents with     Pre-Op Exam     dos: 7/18/17       Initial /84  Pulse 89  Temp 97.6  F (36.4  C) (Tympanic)  Resp 20  Ht 5' 6\" (1.676 m)  Wt 196 lb (88.9 kg)  LMP 06/14/2017  SpO2 98%  Breastfeeding? No  BMI 31.64 kg/m2 Estimated body mass index is 31.64 kg/(m^2) as calculated from the following:    Height as of this encounter: 5' 6\" (1.676 m).    Weight as of this encounter: 196 lb (88.9 kg).  Medication Reconciliation: complete   ................Wilfred Jaffe LPN,   July 5, 2017,      7:38 AM,   [facility]    "

## 2017-07-05 NOTE — LETTER
[facility]  [address] [facility] [address]  Phone: [phone]          July 6, 2017    Brittnee Herron  [address] [facility] [address]          Dear Brittnee,      LAB RESULTS:     The results of your recent labs were essentially NORMAL.  If you have any further questions or problems, please contact our office.          Sincerely,      MEGHAN Way M.D.

## 2017-07-05 NOTE — PROGRESS NOTES
[facility]  [address] [facility] [address]  [phone]  Dept: [phone]    PRE-OP EVALUATION:  Today's date: 2017    Brittnee Herron (: 1965) presents for pre-operative evaluation assessment as requested by Dr. Bean.  She requires evaluation and anesthesia risk assessment prior to undergoing surgery/procedure for treatment of knee pain .  Proposed procedure: Left knee arthroscopy with medial menisectomy.    Date of Surgery/ Procedure: 17  Time of Surgery/ Procedure: 09  Hospital/Surgical Facility: [place], [place]  Primary Physician: Ho Way  Type of Anesthesia Anticipated: General    Patient has a Health Care Directive or Living Will:  NO    1. NO - Do you have a history of heart attack, stroke, stent, bypass or surgery on an artery in the head, neck, heart or legs?  2. NO - Do you ever have any pain or discomfort in your chest?  3. NO - Do you have a history of  Heart Failure?  4. NO - Are you troubled by shortness of breath when: walking on the level, up a slight hill or at night?  5. NO - Do you currently have a cold, bronchitis or other respiratory infection?  6. NO - Do you have a cough, shortness of breath or wheezing?  7. NO - Do you sometimes get pains in the calves of your legs when you walk?  8. NO - Do you or anyone in your family have previous history of blood clots?  9. NO - Do you or does anyone in your family have a serious bleeding problem such as prolonged bleeding following surgeries or cuts?  10. NO - Have you ever had problems with anemia or been told to take iron pills?  11. NO - Have you had any abnormal blood loss such as black, tarry or bloody stools, or abnormal vaginal bleeding?  12. NO - Have you ever had a blood transfusion?  13. NO - Have you or any of your relatives ever had problems with anesthesia?  14. NO - Do you have sleep apnea, excessive snoring or daytime drowsiness?  15. NO - Do you have any  prosthetic heart valves?  16. NO - Do you have prosthetic joints?  17. NO - Is there any chance that you may be pregnant?      HPI:                                                      Brief HPI related to upcoming procedure: painful swollen left knee,tends to snap and buckle (wears a brace)      See problem list for active medical problems.  Problems all longstanding and stable, except as noted/documented.  See ROS for pertinent symptoms related to these conditions.                                                                                                  .    MEDICAL HISTORY:                                                      Patient Active Problem List    Diagnosis Date Noted     Mild recurrent major depression (H) 08/17/2012     Priority: Medium     Rhinitis, allergic seasonal 03/25/2009     Priority: Medium      Past Medical History:   Diagnosis Date     Depressive disorder, not elsewhere classified 1982    off meds for a year     Past Surgical History:   Procedure Laterality Date     C LIGATE FALLOPIAN TUBE,POSTPARTUM  08/03/2002    Postpartum bilateral tubal ligation with partial salpingectomy through a mini laparotomy     HC REMOVAL OF OVARIAN CYST(S)  1990    laparoscopic     HC REMOVE TONSILS/ADENOIDS,<11 Y/O       Current Outpatient Prescriptions   Medication Sig Dispense Refill     diclofenac (VOLTAREN) 75 MG EC tablet Take 1 tablet (75 mg) by mouth 2 times daily as needed for moderate pain 30 tablet 1     citalopram (CELEXA) 20 MG tablet Take 1 tablet (20 mg) by mouth daily 90 tablet 1     OTC products: none    Allergies   Allergen Reactions     No Known Drug Allergies       Latex Allergy: NO    Social History   Substance Use Topics     Smoking status: Never Smoker     Smokeless tobacco: Never Used     Alcohol use No     History   Drug Use No       REVIEW OF SYSTEMS:                                                    Constitutional, HEENT, cardiovascular, pulmonary, gi and gu systems are  "negative, except as otherwise noted.    EXAM:                                                    /84  Pulse 89  Temp 97.6  F (36.4  C) (Tympanic)  Resp 20  Ht 5' 6\" (1.676 m)  Wt 196 lb (88.9 kg)  LMP 06/14/2017  SpO2 98%  Breastfeeding? No  BMI 31.64 kg/m2    GENERAL APPEARANCE: healthy, alert and no distress     EYES: EOMI, PERRL     HENT: ear canals and TM's normal and nose and mouth without ulcers or lesions     NECK: no adenopathy, no asymmetry, masses, or scars and thyroid normal to palpation     RESP: lungs clear to auscultation - no rales, rhonchi or wheezes     CV: regular rates and rhythm, normal S1 S2, no S3 or S4 and no murmur, click or rub     ABDOMEN:  soft, nontender, no HSM or masses and bowel sounds normal     MS: Left knee in a brace     SKIN: no suspicious lesions or rashes     NEURO: Normal strength and tone, sensory exam grossly normal, mentation intact and speech normal     PSYCH: mentation appears normal. and affect normal/bright     LYMPHATICS: No axillary, cervical, or supraclavicular nodes    DIAGNOSTICS:                                                      Labs Drawn and in Process:   Unresulted Labs Ordered in the Past 30 Days of this Admission     No orders found from 5/6/2017 to 7/6/2017.          Recent Labs   Lab Test  07/14/15   0947  06/03/13   1731  04/21/12   1147   HGB   --   11.7  11.8   PLT   --   256  307   NA  137   --   141   POTASSIUM  4.1   --   3.7   CR  0.58   --   0.57        IMPRESSION:                                                    Reason for surgery/procedure: left knee injury,meniscus tear  Diagnosis/reason for consult: clearance for surgery/anesthesia    The proposed surgical procedure is considered INTERMEDIATE risk.    REVISED CARDIAC RISK INDEX  The patient has the following serious cardiovascular risks for perioperative complications such as (MI, PE, VFib and 3  AV Block):  No serious cardiac risks  INTERPRETATION: 0 risks: Class I (very low " risk - 0.4% complication rate)    The patient has the following additional risks for perioperative complications:  No identified additional risks    No diagnosis found.    RECOMMENDATIONS:                                                          --Patient is to take all scheduled medications on the day of surgery EXCEPT for modifications listed below.    Anticoagulant or Antiplatelet Medication Use  NSAIDS: Diclofenac (Voltaren):    Stop one day prior to surgery  Citalopram a day before        APPROVAL GIVEN to proceed with proposed procedure, without further diagnostic evaluation       Signed Electronically by: Ho Way MD, MD    Copy of this evaluation report is provided to requesting physician.    [facility] Preop Guidelines

## 2017-07-05 NOTE — MR AVS SNAPSHOT
After Visit Summary   7/5/2017    Brittnee Herron    MRN: [id number]           Patient Information     Date Of Birth          1965        Visit Information        Provider Department      7/5/2017 7:30 AM Ho Way MD [facility]        Today's Diagnoses     Preop general physical exam    -  1      Care Instructions      Before Your Surgery      Call your surgeon if there is any change in your health. This includes signs of a cold or flu (such as a sore throat, runny nose, cough, rash or fever).    Do not smoke, drink alcohol or take over the counter medicine (unless your surgeon or primary care doctor tells you to) for the 24 hours before and after surgery.    If you take prescribed drugs: Follow your doctor s orders about which medicines to take and which to stop until after surgery.    Eating and drinking prior to surgery: follow the instructions from your surgeon    Take a shower or bath the night before surgery. Use the soap your surgeon gave you to gently clean your skin. If you do not have soap from your surgeon, use your regular soap. Do not shave or scrub the surgery site.  Wear clean pajamas and have clean sheets on your bed.           Follow-ups after your visit        Your next 10 appointments already scheduled     Jul 18, 2017   Procedure with Brittnee Bean MD   [facility] ([facility])    [address] [place], [place] [address] [place] [id number] [phone]           From [address]: Exit at [email or web address] on south side of [place]. Turn right on [email or web address]. Turn left at stoplight on [address]. [facility] will be in view two blocks ahead            Jul 31, 2017  2:00 PM CDT   [facility] MD   [facility]    [address] [facility] [address] [phone]              Who to contact     If you have questions or need follow up  "information about today's clinic visit or your schedule please contact [facility] directly at [phone].  Normal or non-critical lab and imaging results will be communicated to you by [employer], letter or phone within 4 business days after the clinic has received the results. If you do not hear from us within 7 days, please contact the clinic through [employer] or phone. If you have a critical or abnormal lab result, we will notify you by phone as soon as possible.  Submit refill requests through [employer] or call your pharmacy and they will forward the refill request to us. Please allow 3 business days for your refill to be completed.          Additional Information About Your Visit        MyChart Information     [employer] lets you send messages to your doctor, view your test results, renew your prescriptions, schedule appointments and more. To sign up, go to [email or web address] . Click on \"Log in\" on the left side of the screen, which will take you to the Welcome page. Then click on \"Sign up Now\" on the right side of the page.     You will be asked to enter the access code listed below, as well as some personal information. Please follow the directions to create your username and password.     Your access code is: [email or web address]  Expires: 2017  4:39 PM     Your access code will  in 90 days. If you need help or a new code, please call your [facility] clinic or [phone].        Care EveryWhere ID     This is your Care EveryWhere ID. This could be used by other organizations to access your [facility] medical records  [id number]        Your Vitals Were     Pulse Temperature Respirations Height Last Period Pulse Oximetry    89 97.6  F (36.4  C) (Tympanic) 20 5' 6\" (1.676 m) 2017 98%    Breastfeeding? BMI (Body Mass Index)                No 31.64 kg/m2           Blood Pressure from Last 3 Encounters:   17 128/84   17 130/86   17 118/68    Weight from Last 3 Encounters: "   07/05/17 196 lb (88.9 kg)   07/03/17 196 lb (88.9 kg)   06/07/17 196 lb (88.9 kg)              We Performed the Following     Basic metabolic panel  (Ca, Cl, CO2, Creat, Gluc, K, Na, BUN)     CBC with platelets differential        Primary Care Provider Office Phone # Fax #    [address] [phone]       [facility] [address] [facility] [address]        Equal Access to Services     SHADY HELM : Hadii aad ku hadasho Soomaali, waaxda luqadaha, qaybta kaalmada adeegyada, waxay franciscoin hayeugenen zander bhakta . So [facility] [phone].    ATENCIÓN: Si habla español, tiene a shine disposición servicios gratuitos de asistencia lingüística. Annmarieame al [phone].    We comply with applicable federal civil rights laws and [place] laws. We do not discriminate on the basis of race, color, national origin, age, disability sex, sexual orientation or gender identity.            Thank you!     Thank you for choosing [facility]  for your care. Our goal is always to provide you with excellent care. Hearing back from our patients is one way we can continue to improve our services. Please take a few minutes to complete the written survey that you may receive in the mail after your visit with us. Thank you!             Your Updated Medication List - Protect others around you: Learn how to safely use, store and throw away your medicines at www.disposemymeds.org.          This list is accurate as of: 7/5/17 11:40 AM.  Always use your most recent med list.                   Brand Name Dispense Instructions for use Diagnosis    citalopram 20 MG tablet    celeXA    90 tablet    Take 1 tablet (20 mg) by mouth daily    Mild recurrent major depression (H)       diclofenac 75 MG EC tablet    VOLTAREN    30 tablet    Take 1 tablet (75 mg) by mouth 2 times daily as needed for moderate pain    Synovitis of knee

## 2017-07-07 ENCOUNTER — TELEPHONE (OUTPATIENT)
Dept: ORTHOPEDICS | Facility: CLINIC | Age: 52
End: 2017-07-07

## 2017-07-07 NOTE — TELEPHONE ENCOUNTER
Call back to patient states she will be dropping of some workability forms at [employer] and would like the forms to start she will be out of work July 11, 2017 until August 4, 2017.  Informed patient to state when she drops off forms that dates are documented in chart. Patient verbally states she understands....................................................Kirsty Rodriguez CMA  ([facility])

## 2017-07-10 ENCOUNTER — TELEPHONE (OUTPATIENT)
Dept: ORTHOPEDICS | Facility: CLINIC | Age: 52
End: 2017-07-10

## 2017-07-10 NOTE — TELEPHONE ENCOUNTER
Reason for Call:  Other workability note    Detailed comments: needs a workability note stating dates off of: 07/11- 08/14. Patient will pick this up.     Phone Number Patient can be reached at: Home number on file [phone] (home)    Best Time: any    Can we leave a detailed message on this number? YES    Call taken on 7/10/2017 at 8:17 AM by Mackenzie Mak

## 2017-07-10 NOTE — LETTER
[address] [address]  [phone]        July 10, 2017    Re:Brittnee Herron       [address] [address] [address]          To Whom It May Concern,    Brittnee will be off of work from July 11-August 14, 2017.    Sincerely,        Dr. SHALINI Bean, RN

## 2017-07-17 NOTE — H&P (VIEW-ONLY)
[facility]  [address] [facility] [address]  [phone]  Dept: [phone]    PRE-OP EVALUATION:  Today's date: 2017    Brittnee Herron (: 1965) presents for pre-operative evaluation assessment as requested by Dr. Bean.  She requires evaluation and anesthesia risk assessment prior to undergoing surgery/procedure for treatment of knee pain .  Proposed procedure: Left knee arthroscopy with medial menisectomy.    Date of Surgery/ Procedure: 17  Time of Surgery/ Procedure: 09  Hospital/Surgical Facility: [place], [place]  Primary Physician: Ho Way  Type of Anesthesia Anticipated: General    Patient has a Health Care Directive or Living Will:  NO    1. NO - Do you have a history of heart attack, stroke, stent, bypass or surgery on an artery in the head, neck, heart or legs?  2. NO - Do you ever have any pain or discomfort in your chest?  3. NO - Do you have a history of  Heart Failure?  4. NO - Are you troubled by shortness of breath when: walking on the level, up a slight hill or at night?  5. NO - Do you currently have a cold, bronchitis or other respiratory infection?  6. NO - Do you have a cough, shortness of breath or wheezing?  7. NO - Do you sometimes get pains in the calves of your legs when you walk?  8. NO - Do you or anyone in your family have previous history of blood clots?  9. NO - Do you or does anyone in your family have a serious bleeding problem such as prolonged bleeding following surgeries or cuts?  10. NO - Have you ever had problems with anemia or been told to take iron pills?  11. NO - Have you had any abnormal blood loss such as black, tarry or bloody stools, or abnormal vaginal bleeding?  12. NO - Have you ever had a blood transfusion?  13. NO - Have you or any of your relatives ever had problems with anesthesia?  14. NO - Do you have sleep apnea, excessive snoring or daytime drowsiness?  15. NO - Do you have any  prosthetic heart valves?  16. NO - Do you have prosthetic joints?  17. NO - Is there any chance that you may be pregnant?      HPI:                                                      Brief HPI related to upcoming procedure: painful swollen left knee,tends to snap and buckle (wears a brace)      See problem list for active medical problems.  Problems all longstanding and stable, except as noted/documented.  See ROS for pertinent symptoms related to these conditions.                                                                                                  .    MEDICAL HISTORY:                                                      Patient Active Problem List    Diagnosis Date Noted     Mild recurrent major depression (H) 08/17/2012     Priority: Medium     Rhinitis, allergic seasonal 03/25/2009     Priority: Medium      Past Medical History:   Diagnosis Date     Depressive disorder, not elsewhere classified 1982    off meds for a year     Past Surgical History:   Procedure Laterality Date     C LIGATE FALLOPIAN TUBE,POSTPARTUM  08/03/2002    Postpartum bilateral tubal ligation with partial salpingectomy through a mini laparotomy     HC REMOVAL OF OVARIAN CYST(S)  1990    laparoscopic     HC REMOVE TONSILS/ADENOIDS,<9 Y/O       Current Outpatient Prescriptions   Medication Sig Dispense Refill     diclofenac (VOLTAREN) 75 MG EC tablet Take 1 tablet (75 mg) by mouth 2 times daily as needed for moderate pain 30 tablet 1     citalopram (CELEXA) 20 MG tablet Take 1 tablet (20 mg) by mouth daily 90 tablet 1     OTC products: none    Allergies   Allergen Reactions     No Known Drug Allergies       Latex Allergy: NO    Social History   Substance Use Topics     Smoking status: Never Smoker     Smokeless tobacco: Never Used     Alcohol use No     History   Drug Use No       REVIEW OF SYSTEMS:                                                    Constitutional, HEENT, cardiovascular, pulmonary, gi and gu systems are  "negative, except as otherwise noted.    EXAM:                                                    /84  Pulse 89  Temp 97.6  F (36.4  C) (Tympanic)  Resp 20  Ht 5' 6\" (1.676 m)  Wt 196 lb (88.9 kg)  LMP 06/14/2017  SpO2 98%  Breastfeeding? No  BMI 31.64 kg/m2    GENERAL APPEARANCE: healthy, alert and no distress     EYES: EOMI, PERRL     HENT: ear canals and TM's normal and nose and mouth without ulcers or lesions     NECK: no adenopathy, no asymmetry, masses, or scars and thyroid normal to palpation     RESP: lungs clear to auscultation - no rales, rhonchi or wheezes     CV: regular rates and rhythm, normal S1 S2, no S3 or S4 and no murmur, click or rub     ABDOMEN:  soft, nontender, no HSM or masses and bowel sounds normal     MS: Left knee in a brace     SKIN: no suspicious lesions or rashes     NEURO: Normal strength and tone, sensory exam grossly normal, mentation intact and speech normal     PSYCH: mentation appears normal. and affect normal/bright     LYMPHATICS: No axillary, cervical, or supraclavicular nodes    DIAGNOSTICS:                                                      Labs Drawn and in Process:   Unresulted Labs Ordered in the Past 30 Days of this Admission     No orders found from 5/6/2017 to 7/6/2017.          Recent Labs   Lab Test  07/14/15   0947  06/03/13   1731  04/21/12   1147   HGB   --   11.7  11.8   PLT   --   256  307   NA  137   --   141   POTASSIUM  4.1   --   3.7   CR  0.58   --   0.57        IMPRESSION:                                                    Reason for surgery/procedure: left knee injury,meniscus tear  Diagnosis/reason for consult: clearance for surgery/anesthesia    The proposed surgical procedure is considered INTERMEDIATE risk.    REVISED CARDIAC RISK INDEX  The patient has the following serious cardiovascular risks for perioperative complications such as (MI, PE, VFib and 3  AV Block):  No serious cardiac risks  INTERPRETATION: 0 risks: Class I (very low " risk - 0.4% complication rate)    The patient has the following additional risks for perioperative complications:  No identified additional risks    No diagnosis found.    RECOMMENDATIONS:                                                          --Patient is to take all scheduled medications on the day of surgery EXCEPT for modifications listed below.    Anticoagulant or Antiplatelet Medication Use  NSAIDS: Diclofenac (Voltaren):    Stop one day prior to surgery  Citalopram a day before        APPROVAL GIVEN to proceed with proposed procedure, without further diagnostic evaluation       Signed Electronically by: Ho Way MD, MD    Copy of this evaluation report is provided to requesting physician.    [facility] Preop Guidelines

## 2017-07-18 ENCOUNTER — ANESTHESIA (OUTPATIENT)
Dept: SURGERY | Facility: CLINIC | Age: 52
End: 2017-07-18
Payer: COMMERCIAL

## 2017-07-18 ENCOUNTER — HOSPITAL ENCOUNTER (OUTPATIENT)
Facility: CLINIC | Age: 52
Discharge: HOME | End: 2017-07-18
Attending: ORTHOPAEDIC SURGERY | Admitting: ORTHOPAEDIC SURGERY
Payer: COMMERCIAL

## 2017-07-18 ENCOUNTER — APPOINTMENT (OUTPATIENT)
Dept: GENERAL RADIOLOGY | Facility: CLINIC | Age: 52
End: 2017-07-18
Attending: NURSE ANESTHETIST, CERTIFIED REGISTERED
Payer: COMMERCIAL

## 2017-07-18 ENCOUNTER — ANESTHESIA EVENT (OUTPATIENT)
Dept: SURGERY | Facility: CLINIC | Age: 52
End: 2017-07-18
Payer: COMMERCIAL

## 2017-07-18 VITALS
HEART RATE: 72 BPM | RESPIRATION RATE: 16 BRPM | TEMPERATURE: 97.8 F | DIASTOLIC BLOOD PRESSURE: 97 MMHG | SYSTOLIC BLOOD PRESSURE: 158 MMHG | OXYGEN SATURATION: 97 %

## 2017-07-18 DIAGNOSIS — Z98.890 S/P LEFT KNEE ARTHROSCOPY: Primary | ICD-10-CM

## 2017-07-18 PROCEDURE — 25000125 ZZHC RX 250: Performed by: NURSE ANESTHETIST, CERTIFIED REGISTERED

## 2017-07-18 PROCEDURE — 36000056 ZZH SURGERY LEVEL 3 1ST 30 MIN: Performed by: ORTHOPAEDIC SURGERY

## 2017-07-18 PROCEDURE — 37000009 ZZH ANESTHESIA TECHNICAL FEE, EACH ADDTL 15 MIN: Performed by: ORTHOPAEDIC SURGERY

## 2017-07-18 PROCEDURE — 25000128 H RX IP 250 OP 636: Performed by: NURSE ANESTHETIST, CERTIFIED REGISTERED

## 2017-07-18 PROCEDURE — 71000014 ZZH RECOVERY PHASE 1 LEVEL 2 FIRST HR: Performed by: ORTHOPAEDIC SURGERY

## 2017-07-18 PROCEDURE — 29876 ARTHRS KNEE SURG SYNVCT MAJ: CPT | Mod: LT | Performed by: ORTHOPAEDIC SURGERY

## 2017-07-18 PROCEDURE — 40000306 ZZH STATISTIC PRE PROC ASSESS II: Performed by: ORTHOPAEDIC SURGERY

## 2017-07-18 PROCEDURE — 71000015 ZZH RECOVERY PHASE 1 LEVEL 2 EA ADDTL HR: Performed by: ORTHOPAEDIC SURGERY

## 2017-07-18 PROCEDURE — 36000058 ZZH SURGERY LEVEL 3 EA 15 ADDTL MIN: Performed by: ORTHOPAEDIC SURGERY

## 2017-07-18 PROCEDURE — 71000027 ZZH RECOVERY PHASE 2 EACH 15 MINS: Performed by: ORTHOPAEDIC SURGERY

## 2017-07-18 PROCEDURE — 25000566 ZZH SEVOFLURANE, EA 15 MIN: Performed by: ORTHOPAEDIC SURGERY

## 2017-07-18 PROCEDURE — 37000008 ZZH ANESTHESIA TECHNICAL FEE, 1ST 30 MIN: Performed by: ORTHOPAEDIC SURGERY

## 2017-07-18 PROCEDURE — 27210794 ZZH OR GENERAL SUPPLY STERILE: Performed by: ORTHOPAEDIC SURGERY

## 2017-07-18 PROCEDURE — 40000986 XR CHEST 1 VW

## 2017-07-18 RX ORDER — HYDROMORPHONE HYDROCHLORIDE 1 MG/ML
.3-.5 INJECTION, SOLUTION INTRAMUSCULAR; INTRAVENOUS; SUBCUTANEOUS EVERY 10 MIN PRN
Status: DISCONTINUED | OUTPATIENT
Start: 2017-07-18 | End: 2017-07-18 | Stop reason: HOSPADM

## 2017-07-18 RX ORDER — ONDANSETRON 2 MG/ML
4 INJECTION INTRAMUSCULAR; INTRAVENOUS EVERY 30 MIN PRN
Status: DISCONTINUED | OUTPATIENT
Start: 2017-07-18 | End: 2017-07-18 | Stop reason: HOSPADM

## 2017-07-18 RX ORDER — CELECOXIB 200 MG/1
400 CAPSULE ORAL ONCE
Status: DISCONTINUED | OUTPATIENT
Start: 2017-07-18 | End: 2017-07-18 | Stop reason: HOSPADM

## 2017-07-18 RX ORDER — MEPERIDINE HYDROCHLORIDE 25 MG/ML
12.5 INJECTION INTRAMUSCULAR; INTRAVENOUS; SUBCUTANEOUS
Status: DISCONTINUED | OUTPATIENT
Start: 2017-07-18 | End: 2017-07-18 | Stop reason: HOSPADM

## 2017-07-18 RX ORDER — FENTANYL CITRATE 50 UG/ML
INJECTION, SOLUTION INTRAMUSCULAR; INTRAVENOUS PRN
Status: DISCONTINUED | OUTPATIENT
Start: 2017-07-18 | End: 2017-07-18

## 2017-07-18 RX ORDER — CEFAZOLIN SODIUM 2 G/100ML
2 INJECTION, SOLUTION INTRAVENOUS
Status: DISCONTINUED | OUTPATIENT
Start: 2017-07-18 | End: 2017-07-18 | Stop reason: HOSPADM

## 2017-07-18 RX ORDER — DEXAMETHASONE SODIUM PHOSPHATE 10 MG/ML
INJECTION, SOLUTION INTRAMUSCULAR; INTRAVENOUS PRN
Status: DISCONTINUED | OUTPATIENT
Start: 2017-07-18 | End: 2017-07-18

## 2017-07-18 RX ORDER — DIPHENHYDRAMINE HYDROCHLORIDE 50 MG/ML
25 INJECTION INTRAMUSCULAR; INTRAVENOUS EVERY 6 HOURS PRN
Status: DISCONTINUED | OUTPATIENT
Start: 2017-07-18 | End: 2017-07-18 | Stop reason: HOSPADM

## 2017-07-18 RX ORDER — PROPOFOL 10 MG/ML
INJECTION, EMULSION INTRAVENOUS PRN
Status: DISCONTINUED | OUTPATIENT
Start: 2017-07-18 | End: 2017-07-18

## 2017-07-18 RX ORDER — SODIUM CHLORIDE, SODIUM LACTATE, POTASSIUM CHLORIDE, CALCIUM CHLORIDE 600; 310; 30; 20 MG/100ML; MG/100ML; MG/100ML; MG/100ML
INJECTION, SOLUTION INTRAVENOUS CONTINUOUS
Status: DISCONTINUED | OUTPATIENT
Start: 2017-07-18 | End: 2017-07-18 | Stop reason: HOSPADM

## 2017-07-18 RX ORDER — LABETALOL HYDROCHLORIDE 5 MG/ML
10 INJECTION, SOLUTION INTRAVENOUS
Status: DISCONTINUED | OUTPATIENT
Start: 2017-07-18 | End: 2017-07-18 | Stop reason: HOSPADM

## 2017-07-18 RX ORDER — ONDANSETRON 2 MG/ML
INJECTION INTRAMUSCULAR; INTRAVENOUS PRN
Status: DISCONTINUED | OUTPATIENT
Start: 2017-07-18 | End: 2017-07-18

## 2017-07-18 RX ORDER — MELOXICAM 15 MG/1
15 TABLET ORAL DAILY
Qty: 30 TABLET | Refills: 1 | Status: SHIPPED | OUTPATIENT
Start: 2017-07-18 | End: 2017-09-20

## 2017-07-18 RX ORDER — ONDANSETRON 4 MG/1
4 TABLET, ORALLY DISINTEGRATING ORAL EVERY 30 MIN PRN
Status: DISCONTINUED | OUTPATIENT
Start: 2017-07-18 | End: 2017-07-18 | Stop reason: HOSPADM

## 2017-07-18 RX ORDER — DIMENHYDRINATE 50 MG/ML
25 INJECTION, SOLUTION INTRAMUSCULAR; INTRAVENOUS
Status: COMPLETED | OUTPATIENT
Start: 2017-07-18 | End: 2017-07-18

## 2017-07-18 RX ORDER — SCOLOPAMINE TRANSDERMAL SYSTEM 1 MG/1
1 PATCH, EXTENDED RELEASE TRANSDERMAL
Status: DISCONTINUED | OUTPATIENT
Start: 2017-07-18 | End: 2017-07-18 | Stop reason: HOSPADM

## 2017-07-18 RX ORDER — ONDANSETRON 4 MG/1
4 TABLET, ORALLY DISINTEGRATING ORAL
Status: DISCONTINUED | OUTPATIENT
Start: 2017-07-18 | End: 2017-07-18 | Stop reason: HOSPADM

## 2017-07-18 RX ORDER — FENTANYL CITRATE 50 UG/ML
25-50 INJECTION, SOLUTION INTRAMUSCULAR; INTRAVENOUS
Status: DISCONTINUED | OUTPATIENT
Start: 2017-07-18 | End: 2017-07-18 | Stop reason: HOSPADM

## 2017-07-18 RX ORDER — ACETAMINOPHEN 325 MG/1
975 TABLET ORAL ONCE
Status: DISCONTINUED | OUTPATIENT
Start: 2017-07-18 | End: 2017-07-18 | Stop reason: HOSPADM

## 2017-07-18 RX ORDER — LIDOCAINE HYDROCHLORIDE 20 MG/ML
INJECTION, SOLUTION INFILTRATION; PERINEURAL PRN
Status: DISCONTINUED | OUTPATIENT
Start: 2017-07-18 | End: 2017-07-18

## 2017-07-18 RX ORDER — AMOXICILLIN 250 MG
1-2 CAPSULE ORAL 2 TIMES DAILY
Qty: 50 TABLET | Refills: 0 | Status: SHIPPED | OUTPATIENT
Start: 2017-07-18 | End: 2018-07-18

## 2017-07-18 RX ORDER — KETOROLAC TROMETHAMINE 30 MG/ML
30 INJECTION, SOLUTION INTRAMUSCULAR; INTRAVENOUS EVERY 6 HOURS PRN
Status: DISCONTINUED | OUTPATIENT
Start: 2017-07-18 | End: 2017-07-18 | Stop reason: HOSPADM

## 2017-07-18 RX ORDER — HYDROCODONE BITARTRATE AND ACETAMINOPHEN 5; 325 MG/1; MG/1
1-2 TABLET ORAL EVERY 4 HOURS PRN
Qty: 50 TABLET | Refills: 0 | Status: SHIPPED | OUTPATIENT
Start: 2017-07-18 | End: 2017-08-07

## 2017-07-18 RX ORDER — LIDOCAINE 40 MG/G
CREAM TOPICAL
Status: DISCONTINUED | OUTPATIENT
Start: 2017-07-18 | End: 2017-07-18 | Stop reason: HOSPADM

## 2017-07-18 RX ORDER — NALOXONE HYDROCHLORIDE 0.4 MG/ML
.1-.4 INJECTION, SOLUTION INTRAMUSCULAR; INTRAVENOUS; SUBCUTANEOUS
Status: DISCONTINUED | OUTPATIENT
Start: 2017-07-18 | End: 2017-07-18 | Stop reason: HOSPADM

## 2017-07-18 RX ORDER — HYDROCODONE BITARTRATE AND ACETAMINOPHEN 5; 325 MG/1; MG/1
1-2 TABLET ORAL
Status: DISCONTINUED | OUTPATIENT
Start: 2017-07-18 | End: 2017-07-18 | Stop reason: HOSPADM

## 2017-07-18 RX ORDER — CEFAZOLIN SODIUM 1 G/3ML
1 INJECTION, POWDER, FOR SOLUTION INTRAMUSCULAR; INTRAVENOUS SEE ADMIN INSTRUCTIONS
Status: DISCONTINUED | OUTPATIENT
Start: 2017-07-18 | End: 2017-07-18 | Stop reason: HOSPADM

## 2017-07-18 RX ADMIN — PROCHLORPERAZINE EDISYLATE 5 MG: 5 INJECTION INTRAMUSCULAR; INTRAVENOUS at 14:08

## 2017-07-18 RX ADMIN — ONDANSETRON 4 MG: 2 INJECTION INTRAMUSCULAR; INTRAVENOUS at 12:48

## 2017-07-18 RX ADMIN — FENTANYL CITRATE 100 MCG: 50 INJECTION, SOLUTION INTRAMUSCULAR; INTRAVENOUS at 12:04

## 2017-07-18 RX ADMIN — SODIUM CHLORIDE, POTASSIUM CHLORIDE, SODIUM LACTATE AND CALCIUM CHLORIDE: 600; 310; 30; 20 INJECTION, SOLUTION INTRAVENOUS at 10:27

## 2017-07-18 RX ADMIN — FENTANYL CITRATE 25 MCG: 50 INJECTION INTRAMUSCULAR; INTRAVENOUS at 14:21

## 2017-07-18 RX ADMIN — DIPHENHYDRAMINE HYDROCHLORIDE 25 MG: 50 INJECTION, SOLUTION INTRAMUSCULAR; INTRAVENOUS at 15:40

## 2017-07-18 RX ADMIN — ONDANSETRON 4 MG: 2 INJECTION INTRAMUSCULAR; INTRAVENOUS at 13:12

## 2017-07-18 RX ADMIN — LIDOCAINE HYDROCHLORIDE 1 ML: 10 INJECTION, SOLUTION EPIDURAL; INFILTRATION; INTRACAUDAL; PERINEURAL at 10:27

## 2017-07-18 RX ADMIN — DIMENHYDRINATE 25 MG: 50 INJECTION, SOLUTION INTRAMUSCULAR; INTRAVENOUS at 13:17

## 2017-07-18 RX ADMIN — PROPOFOL 200 MG: 10 INJECTION, EMULSION INTRAVENOUS at 12:03

## 2017-07-18 RX ADMIN — PROCHLORPERAZINE EDISYLATE 5 MG: 5 INJECTION INTRAMUSCULAR; INTRAVENOUS at 13:28

## 2017-07-18 RX ADMIN — LIDOCAINE HYDROCHLORIDE 80 MG: 20 INJECTION, SOLUTION INFILTRATION; PERINEURAL at 12:03

## 2017-07-18 RX ADMIN — MIDAZOLAM HYDROCHLORIDE 2 MG: 1 INJECTION, SOLUTION INTRAMUSCULAR; INTRAVENOUS at 11:57

## 2017-07-18 RX ADMIN — KETOROLAC TROMETHAMINE 30 MG: 30 INJECTION, SOLUTION INTRAMUSCULAR at 13:37

## 2017-07-18 RX ADMIN — SODIUM CHLORIDE, POTASSIUM CHLORIDE, SODIUM LACTATE AND CALCIUM CHLORIDE: 600; 310; 30; 20 INJECTION, SOLUTION INTRAVENOUS at 12:24

## 2017-07-18 RX ADMIN — SODIUM CHLORIDE, POTASSIUM CHLORIDE, SODIUM LACTATE AND CALCIUM CHLORIDE: 600; 310; 30; 20 INJECTION, SOLUTION INTRAVENOUS at 11:58

## 2017-07-18 RX ADMIN — SCOPALAMINE 1 PATCH: 1 PATCH, EXTENDED RELEASE TRANSDERMAL at 10:42

## 2017-07-18 RX ADMIN — DEXAMETHASONE SODIUM PHOSPHATE 10 MG: 10 INJECTION, SOLUTION INTRAMUSCULAR; INTRAVENOUS at 12:20

## 2017-07-18 RX ADMIN — SODIUM CHLORIDE, POTASSIUM CHLORIDE, SODIUM LACTATE AND CALCIUM CHLORIDE: 600; 310; 30; 20 INJECTION, SOLUTION INTRAVENOUS at 15:44

## 2017-07-18 RX ADMIN — FENTANYL CITRATE 25 MCG: 50 INJECTION INTRAMUSCULAR; INTRAVENOUS at 14:03

## 2017-07-18 NOTE — ANESTHESIA CARE TRANSFER NOTE
Patient: Brittnee Herron    Procedure(s):  left knee arthroscopy with partial medial menisectomy - Wound Class: I-Clean    Diagnosis: tear medial meniscus left knee  Diagnosis Additional Information: No value filed.    Anesthesia Type:   General, LMA     Note:  Airway :Nasal Cannula  Patient transferred to:PACU        Vitals: (Last set prior to Anesthesia Care Transfer)    CRNA VITALS  7/18/2017 1226 - 7/18/2017 1310      7/18/2017             Pulse: 105    SpO2: 97 %    Resp Rate (observed): 22                Electronically Signed By: CLEMENTE Solis CRNA  July 18, 2017  1:10 PM

## 2017-07-18 NOTE — ANESTHESIA PREPROCEDURE EVALUATION
Anesthesia Evaluation     . Pt has had prior anesthetic. Type: General    No history of anesthetic complications          ROS/MED HX    ENT/Pulmonary:  - neg pulmonary ROS     Neurologic:  - neg neurologic ROS    (-) Other neuro hx (major depressive disorder)   Cardiovascular:  - neg cardiovascular ROS       METS/Exercise Tolerance:     Hematologic:  - neg hematologic  ROS       Musculoskeletal:  - neg musculoskeletal ROS       GI/Hepatic:  - neg GI/hepatic ROS       Renal/Genitourinary:  - ROS Renal section negative       Endo:  - neg endo ROS       Psychiatric:     (+) psychiatric history depression      Infectious Disease:  - neg infectious disease ROS       Malignancy:         Other:    (+) No chance of pregnancy C-spine cleared: N/A, no H/O Chronic Pain,no other significant disability                    Physical Exam  Normal systems: cardiovascular, pulmonary and dental    Airway   Mallampati: II  TM distance: <3 FB  Neck ROM: full    Dental     Cardiovascular   Rhythm and rate: regular and normal      Pulmonary    breath sounds clear to auscultation                    Anesthesia Plan      History & Physical Review  History and physical reviewed and following examination; no interval change.    ASA Status:  1 .    NPO Status:  > 8 hours    Plan for General and LMA with Intravenous induction. Maintenance will be Inhalation.    PONV prophylaxis:  Ondansetron (or other 5HT-3) and Dexamethasone or Solumedrol       Postoperative Care  Postoperative pain management:  IV analgesics and Oral pain medications.      Consents  Anesthetic plan, risks, benefits and alternatives discussed with:  Patient.  Use of blood products discussed: No .   .                          .

## 2017-07-18 NOTE — PROGRESS NOTES
Anesthesia  Pt vomited post surgical case after LMA dc/ed. Pt was immediately placed on side and suctioned. It did not appear that patient aspirated any emesis. Lungs were clear upon auscultation. Pt then transferred to PACU, and within 5 minutes of being there began vomiting again. This time patient moved herself on the side and proceeded to vomit in a emesis bag. Sats were above 96%, lungs clear. Will follow up and obtain CXR to R/O Aspiration Pneumonitis.

## 2017-07-18 NOTE — BRIEF OP NOTE
[facility] Brief Operative Note    Pre-operative diagnosis: tear medial meniscus left knee   Post-operative diagnosis: Left knee mild DJD and synovitis   Procedure: Procedure(s):  Left knee arthroscopy, djd debridement, and synovectomy   Surgeon: Brittnee Bean MD   Assistant(s): CINDI Rodriguez   Anesthesia: General endotracheal anesthesia and Local anesthesia   Estimated blood loss: Minimal       Drains: None   Specimens: None   Implants: None   Findings: Medial meniscus intact   Complications: None   Condition: Patient left OR with vital signs stable and vascular status intact.   Comments: See operative report for full details.  Dictation #[id number]

## 2017-07-18 NOTE — DISCHARGE INSTRUCTIONS
General Knee Arthroscopy Discharge Instructions                                     [phone]  Bone and Joint Service Line for issues or concerns      General Care:  After surgery you may feel tired/sleepy. This is normal. Please have someone stay with you for 24 hours after surgery. You should avoid driving for 1-2 days after surgery, as your reaction time may be slow. You should not drive at all if you have had surgery on your arms, right leg and/or are taking narcotic pain medications until released by your doctor. If you have any question along the way please contact the office. If you feel it is an issue cannot wait for normal office hours, contact the on-call physician. Use ice on the knee intermittently. Elevate your leg with a couple of pillows placed under your ankle/calf area. Do this for the first couple days frequently.     Bandages:   Change your bandage after the first 72 hours. You may use Band-Aids or sterile gauze with a small amount of tape. It s normal to have some blood-tinged fluid on your bandages, this will usually continue for the first day or two. Keep the area clean and dry. Do not apply any ointments. Use the ACE wrap from your foot to thigh until you are seen at your follow up.     Bathing:  Do not submerge your incision in water such as a bath or pool. It is ok to shower after removing your initial bandage after the first 2 days from surgery. Avoid any excessively hot showers, baths, or hot tubes after surgery.     Follow up:  Your follow up appointment should already be scheduled. If its not, please call the office to verify an appointment 10 days after surgery.     Diet:  Start with non-alcoholic liquids at first, particularly water or sports drinks after surgery. Progress to bland foods such as crackers and bread and finally to your normal diet if you have no problems.     Pain control:  Take your pain medications as prescribed. Use the pain medication liberally over the first 48  hour, then taper your use. These medications may make you sleepy. Do not drive, operate equipment, or drink alcohol when taking these. Using ice intermittently (15 minutes on and 15 minutes off) can also help. You may take Tylenol or extra strength Tylenol (Generic name is acetaminophen) or NSAIDs (Motrin, Ibuprofen, Aleve, Naproxen) as directed on the bottle for additional relief or in place of the prescribed pain medications as your pain gets better. If the medications cause a reaction such as nausea or skin rash, stop taking them and contact your doctor. Please plan accordingly, pain medications will not be re-filled on the weekends or at night. Call the office during the day if you need more medications.    Crutches:  Use crutches/walker/cane only if needed after surgery. You can stop using these when you feel stable on your feet.     Braces:  Some surgeries will require the use of a brace. Use this brace as directed.     Physical Therapy:  Often times you will not require formal physical therapy for this surgery.  If home exercises or formal physical therapy is needed, your doctor will direct you on this at your follow up visit.    Activity:  Unless otherwise instructed, you can weight bear as tolerated. While laying or sitting down you should straighten your knee all the way out and then gently work on bending the knee back. Do not worry at first if your knee feels stiff and will not bend like normal, this will get better.     Dressing:  Keep dressing on for the first 2 days, on the 3rd day after surgery you can remove the dressing and do daily dressing changes with gauze and ace or Band-Aids until your follow up appointment. You can always loosen the ace wrap if it feels too tight or you  have numbness or tingling.      Normal findings after surgery:  Numbness and tenderness around the incisions is normal.  You may have bruising around the incisions.  Your knee will be swollen for 3 weeks after surgery. It  will feel  tight  to move.   Low grade fevers less than 100.5 degrees Fahrenheit are normal.       When to call the Office:  Temperature greater than 101.5 degrees Fahreheit.  Fever, chills, and increasing pain in the knee.  Excessive drainage from the incisions that include bright red blood.  Drainage from the incisions sites that appear yellow, pus-like, or foul smelling.  Increasing pain the knee not relieved by the prescribed pain medications or ice.  Pain or swelling in your calf area (in back above your ankle)  Any other effects you feel are significant.    [facility] Anesthesia  Adult Discharge Orders & Instructions     For 24 hours after surgery    1. Get plenty of rest.  A responsible adult must stay with you for at least 24 hours after you leave the hospital.   2. Do not drive or use heavy equipment.  If you have weakness or tingling, don't drive or use heavy equipment until this feeling goes away.  3. Do not drink alcohol.  4. Avoid strenuous or risky activities.  Ask for help when climbing stairs.   5. You may feel lightheaded.  IF so, sit for a few minutes before standing.  Have someone help you get up.   6. If you have nausea (feel sick to your stomach): Drink only clear liquids such as apple juice, ginger ale, broth or 7-Up.  Rest may also help.  Be sure to drink enough fluids.  Move to a regular diet as you feel able.  7. You may have a slight fever. Call the doctor if your fever is over 100 F (37.7 C) (taken under the tongue) or lasts longer than 24 hours.  8. You may have a dry mouth, a sore throat, muscle aches or trouble sleeping.  These should go away after 24 hours.  9. Do not make important or legal decisions.   Call your doctor for any of the followin.  Signs of infection (fever, growing tenderness at the surgery site, a large amount of drainage or bleeding, severe pain, foul-smelling drainage, redness, swelling).    2. It has been over 8 to 10 hours since surgery and  you are still not able to urinate (pass water).    3.  Headache for over 24 hours.    To contact a doctor, call [phone], a 24 hour nurse advice line.       DISCHARGE INSTRUCTIONS FOR PATIENT   SCOPOLAMINE TRANSDERMAL PATCH  You may leave the patch on behind your ear for three days, but NO LONGER. You may have withdrawal symptoms (nausea, vomiting, headache, dizziness) if used longer.  When you remove the patch, you may wash and dry your hands thoroughly and before touching your eyes, as pupil may dilate.  Discard patch (away from children and pets).  You may develop some urinary hesitancy or urine retention.

## 2017-07-18 NOTE — IP AVS SNAPSHOT
[facility]    [address]     [place] [address]    Phone:  [phone]                                       After Visit Summary   7/18/2017    Brittnee Herron    MRN: [id number]           After Visit Summary Signature Page     I have received my discharge instructions, and my questions have been answered. I have discussed any challenges I see with this plan with the nurse or doctor.    ..........................................................................................................................................  Patient/Patient Representative Signature      ..........................................................................................................................................  Patient Representative Print Name and Relationship to Patient    ..................................................               ................................................  Date                                            Time    ..........................................................................................................................................  Reviewed by Signature/Title    ...................................................              ..............................................  Date                                                            Time

## 2017-07-18 NOTE — IP AVS SNAPSHOT
MRN:[id number]                      After Visit Summary   7/18/2017    Brittnee Herron    MRN: [id number]           Thank you!     Thank you for choosing [facility] for your care. Our goal is always to provide you with excellent care. Hearing back from our patients is one way we can continue to improve our services. Please take a few minutes to complete the written survey that you may receive in the mail after you visit with us. Thank you!        Patient Information     Date Of Birth          1965        About your hospital stay     You were admitted on:  July 18, 2017 You last received care in the:  [facility] Phase II    You were discharged on:  July 18, 2017       Who to Call     For medical emergencies, please call 911.  For non-urgent questions about your medical care, please call your primary care provider or clinic, [phone]  For questions related to your surgery, please call your surgery clinic        Attending Provider     Provider Specialty    Brittnee Bean MD Orthopaedic Surgery       Primary Care Provider Office Phone # Fax #    [address] [phone]      After Care Instructions      Diet as Tolerated       Return to diet before surgery, unless instructed otherwise.            Discharge Instructions       Review outpatient procedure discharge instructions with patient as directed by Provider            Ice to affected area       Ice pack to surgical site every 15 minutes per hour for 24 hours            No driving or operating machinery        until the day after procedure            Remove dressing - at 72 hours       Keep dressing on for the first 2 days, on the 3rd day after surgery you can remove the dressing and do daily dressing changes with gauze and Ace wrap or Band-Aids until your follow up appointment.            Return to clinic       Return to clinic on 7/1 at 2pm at [facility] with Brittnee  MD Vikas, at that time we will not need xrays.            Weight bearing - As tolerated                 Your next 10 appointments already scheduled     Jul 31, 2017  2:00 PM CDT   [facility]   [facility]    [address] [id number] [phone]              Further instructions from your care team       General Knee Arthroscopy Discharge Instructions                                     [phone]  Bone and Joint Service Line for issues or concerns      General Care:  After surgery you may feel tired/sleepy. This is normal. Please have someone stay with you for 24 hours after surgery. You should avoid driving for 1-2 days after surgery, as your reaction time may be slow. You should not drive at all if you have had surgery on your arms, right leg and/or are taking narcotic pain medications until released by your doctor. If you have any question along the way please contact the office. If you feel it is an issue cannot wait for normal office hours, contact the on-call physician. Use ice on the knee intermittently. Elevate your leg with a couple of pillows placed under your ankle/calf area. Do this for the first couple days frequently.     Bandages:   Change your bandage after the first 72 hours. You may use Band-Aids or sterile gauze with a small amount of tape. It s normal to have some blood-tinged fluid on your bandages, this will usually continue for the first day or two. Keep the area clean and dry. Do not apply any ointments. Use the ACE wrap from your foot to thigh until you are seen at your follow up.     Bathing:  Do not submerge your incision in water such as a bath or pool. It is ok to shower after removing your initial bandage after the first 2 days from surgery. Avoid any excessively hot showers, baths, or hot tubes after surgery.     Follow up:  Your follow up appointment should already be scheduled. If  its not, please call the office to verify an appointment 10 days after surgery.     Diet:  Start with non-alcoholic liquids at first, particularly water or sports drinks after surgery. Progress to bland foods such as crackers and bread and finally to your normal diet if you have no problems.     Pain control:  Take your pain medications as prescribed. Use the pain medication liberally over the first 48 hour, then taper your use. These medications may make you sleepy. Do not drive, operate equipment, or drink alcohol when taking these. Using ice intermittently (15 minutes on and 15 minutes off) can also help. You may take Tylenol or extra strength Tylenol (Generic name is acetaminophen) or NSAIDs (Motrin, Ibuprofen, Aleve, Naproxen) as directed on the bottle for additional relief or in place of the prescribed pain medications as your pain gets better. If the medications cause a reaction such as nausea or skin rash, stop taking them and contact your doctor. Please plan accordingly, pain medications will not be re-filled on the weekends or at night. Call the office during the day if you need more medications.    Crutches:  Use crutches/walker/cane only if needed after surgery. You can stop using these when you feel stable on your feet.     Braces:  Some surgeries will require the use of a brace. Use this brace as directed.     Physical Therapy:  Often times you will not require formal physical therapy for this surgery.  If home exercises or formal physical therapy is needed, your doctor will direct you on this at your follow up visit.    Activity:  Unless otherwise instructed, you can weight bear as tolerated. While laying or sitting down you should straighten your knee all the way out and then gently work on bending the knee back. Do not worry at first if your knee feels stiff and will not bend like normal, this will get better.     Dressing:  Keep dressing on for the first 2 days, on the 3rd day after surgery you can  remove the dressing and do daily dressing changes with gauze and ace or Band-Aids until your follow up appointment. You can always loosen the ace wrap if it feels too tight or you  have numbness or tingling.      Normal findings after surgery:  Numbness and tenderness around the incisions is normal.  You may have bruising around the incisions.  Your knee will be swollen for 3 weeks after surgery. It will feel  tight  to move.   Low grade fevers less than 100.5 degrees Fahrenheit are normal.       When to call the Office:  Temperature greater than 101.5 degrees Fahreheit.  Fever, chills, and increasing pain in the knee.  Excessive drainage from the incisions that include bright red blood.  Drainage from the incisions sites that appear yellow, pus-like, or foul smelling.  Increasing pain the knee not relieved by the prescribed pain medications or ice.  Pain or swelling in your calf area (in back above your ankle)  Any other effects you feel are significant.    [facility] Anesthesia  Adult Discharge Orders & Instructions     For 24 hours after surgery    1. Get plenty of rest.  A responsible adult must stay with you for at least 24 hours after you leave the hospital.   2. Do not drive or use heavy equipment.  If you have weakness or tingling, don't drive or use heavy equipment until this feeling goes away.  3. Do not drink alcohol.  4. Avoid strenuous or risky activities.  Ask for help when climbing stairs.   5. You may feel lightheaded.  IF so, sit for a few minutes before standing.  Have someone help you get up.   6. If you have nausea (feel sick to your stomach): Drink only clear liquids such as apple juice, ginger ale, broth or 7-Up.  Rest may also help.  Be sure to drink enough fluids.  Move to a regular diet as you feel able.  7. You may have a slight fever. Call the doctor if your fever is over 100 F (37.7 C) (taken under the tongue) or lasts longer than 24 hours.  8. You may have a dry mouth, a  "sore throat, muscle aches or trouble sleeping.  These should go away after 24 hours.  9. Do not make important or legal decisions.   Call your doctor for any of the followin.  Signs of infection (fever, growing tenderness at the surgery site, a large amount of drainage or bleeding, severe pain, foul-smelling drainage, redness, swelling).    2. It has been over 8 to 10 hours since surgery and you are still not able to urinate (pass water).    3.  Headache for over 24 hours.    To contact a doctor, call [phone], a 24 hour nurse advice line.       DISCHARGE INSTRUCTIONS FOR PATIENT   SCOPOLAMINE TRANSDERMAL PATCH  You may leave the patch on behind your ear for three days, but NO LONGER. You may have withdrawal symptoms (nausea, vomiting, headache, dizziness) if used longer.  When you remove the patch, you may wash and dry your hands thoroughly and before touching your eyes, as pupil may dilate.  Discard patch (away from children and pets).  You may develop some urinary hesitancy or urine retention.        Pending Results     Date and Time Order Name Status Description    2017 1356 XR Chest 1 View Preliminary             Admission Information     Date & Time Provider Department Dept. Phone    2017 Brittnee Bean MD [facility] Phase [phone]      Your Vitals Were     Blood Pressure Pulse Temperature Respirations Last Period Pulse Oximetry    158/97 72 97.8  F (36.6  C) (Axillary) 16 2017 97%      MyChart Information     [facility] lets you send messages to your doctor, view your test results, renew your prescriptions, schedule appointments and more. To sign up, go to [email or web address] . Click on \"Log in\" on the left side of the screen, which will take you to the Welcome page. Then click on \"Sign up Now\" on the right side of the page.     You will be asked to enter the access code listed below, as well as some personal information. Please follow the directions to create " your username and password.     Your access code is: [email or web address]  Expires: 2017  4:39 PM     Your access code will  in 90 days. If you need help or a new code, please call your [facility] clinic or [phone].        Care EveryWhere ID     This is your Care EveryWhere ID. This could be used by other organizations to access your [facility] medical records  [id number]        Equal Access to Services     EMELIA HELM : Hadii andrew ku hadasho Soomaali, waaxda luqadaha, qaybta kaalmada adeegyada, angel singhin damiann zander bhakta . So [facility] [phone].    ATENCIÓN: Si floresita perezgenaro, tiene a shine disposición servicios gratuitos de asistencia lingüística. Llame al [phone].    We comply with applicable federal civil rights laws and [place] laws. We do not discriminate on the basis of race, color, national origin, age, disability sex, sexual orientation or gender identity.               Review of your medicines      START taking        Dose / Directions    HYDROcodone-acetaminophen 5-325 MG per tablet   Commonly known as:  NORCO   Used for:  S/P left knee arthroscopy        Dose:  1-2 tablet   Take 1-2 tablets by mouth every 4 hours as needed for other (Moderate to Severe Pain)   Quantity:  50 tablet   Refills:  0       meloxicam 15 MG tablet   Commonly known as:  MOBIC   Used for:  S/P left knee arthroscopy        Dose:  15 mg   Take 1 tablet (15 mg) by mouth daily   Quantity:  30 tablet   Refills:  1       senna-docusate 8.6-50 MG per tablet   Commonly known as:  SENOKOT-S;PERICOLACE   Used for:  S/P left knee arthroscopy        Dose:  1-2 tablet   Take 1-2 tablets by mouth 2 times daily Take while on oral narcotics to prevent or treat constipation.   Quantity:  50 tablet   Refills:  0         CONTINUE these medicines which have NOT CHANGED        Dose / Directions    citalopram 20 MG tablet   Commonly known as:  celeXA   Used for:  Mild recurrent major depression (H)        Dose:  20 mg   Take 1  tablet (20 mg) by mouth daily   Quantity:  90 tablet   Refills:  1       diclofenac 75 MG EC tablet   Commonly known as:  VOLTAREN   Used for:  Synovitis of knee        Dose:  75 mg   Take 1 tablet (75 mg) by mouth 2 times daily as needed for moderate pain   Quantity:  30 tablet   Refills:  1            Where to get your medicines      These medications were sent to [facility] [place], [place] - [address]  [address] , [place] [address]     Phone:  [phone]     meloxicam 15 MG tablet    senna-docusate 8.6-50 MG per tablet         Some of these will need a paper prescription and others can be bought over the counter. Ask your nurse if you have questions.     Bring a paper prescription for each of these medications     HYDROcodone-acetaminophen 5-325 MG per tablet                Protect others around you: Learn how to safely use, store and throw away your medicines at www.disposemymeds.org.             Medication List: This is a list of all your medications and when to take them. Check marks below indicate your daily home schedule. Keep this list as a reference.      Medications           Morning Afternoon Evening Bedtime As Needed    citalopram 20 MG tablet   Commonly known as:  celeXA   Take 1 tablet (20 mg) by mouth daily                                diclofenac 75 MG EC tablet   Commonly known as:  VOLTAREN   Take 1 tablet (75 mg) by mouth 2 times daily as needed for moderate pain                                HYDROcodone-acetaminophen 5-325 MG per tablet   Commonly known as:  NORCO   Take 1-2 tablets by mouth every 4 hours as needed for other (Moderate to Severe Pain)                                meloxicam 15 MG tablet   Commonly known as:  MOBIC   Take 1 tablet (15 mg) by mouth daily                                senna-docusate 8.6-50 MG per tablet   Commonly known as:  SENOKOT-S;PERICOLACE   Take 1-2 tablets by mouth 2 times daily Take while on oral narcotics to prevent or  treat constipation.

## 2017-07-18 NOTE — ANESTHESIA POSTPROCEDURE EVALUATION
Patient: Brittnee Herron    Procedure(s):  left knee arthroscopy with partial medial menisectomy - Wound Class: I-Clean    Diagnosis:tear medial meniscus left knee  Diagnosis Additional Information: No value filed.    Anesthesia Type:  General, LMA    Note:  Anesthesia Post Evaluation    Patient location during evaluation: Phase 2  Patient participation: Able to fully participate in evaluation  Level of consciousness: awake  Pain management: adequate  Airway patency: patent  Cardiovascular status: acceptable  Respiratory status: nasal cannula  Hydration status: acceptable  PONV: controlled             Last vitals:  Vitals:    07/18/17 1449 07/18/17 1500 07/18/17 1515   BP: (!) 152/91 (!) 152/101 (!) 155/117   Pulse:      Resp: 14 16 16   Temp:      SpO2: 95% 96% 97%         Electronically Signed By: CLEMENTE Solis CRNA  July 18, 2017  3:27 PM

## 2017-07-22 NOTE — OP NOTE
Surgeon / Clinician: Brittnee Bean MD    PREOPERATIVE DIAGNOSIS:  Left knee medial meniscal tear.    POSTOPERATIVE DIAGNOSIS:  Left knee mild degenerative joint disease and synovitis.    PROCEDURE:  Left knee arthroscopy with degenerative joint disease debridement and synovectomy.    SURGEON:  Brittnee Bean MD.    ASSISTANT:  Tate Sharpe PA-C.  It was necessary to have a MERVIN assist on the case as he helped with patient positioning, prepping and draping, arthroscopic approach, debridement of the degenerative joint disease and synovectomy, wound closure, postop local anesthesia and dressing.    ESTIMATED BLOOD LOSS:  Minimal.      DRAINS, SPECIMENS, IMPLANTS:  None.    FINDINGS:  Her medial meniscus was actually intact.    COMPLICATIONS:  None.    DISPOSITION:  Patient to recovery area in stable condition.      POSTOPERATIVE PLAN:  She is to have a bulky dressing with ice, elevation, pain control.  She will follow up with the office in 10-14 days.  This will be discussed with her family.    OPERATIVE INDICATIONS:  The patient is a very pleasant, 51-year-old female, who is seen in the office with left knee pain.  Her history and physical examination, and MRI were consistent with left knee possible medial meniscal tear.  Risks and benefits of surgery discussed with the patient.  She elected to undergo left knee arthroscopy with partial medial meniscectomy.  Consent was signed.      OPERATIVE NOTE:  On July 18, 2017, patient was taken to the operating room.  First, she had general anesthesia induced by the anesthesia department.  She was supine on the operating room table with a nonsterile tourniquet high up on her left knee, that was actually not used.  Her left knee was then prepped and draped in the usual standard fashion.  Prior to starting the case, a time-out was called and prophylactic antibiotics were given.  Standard arthroscopy portals were placed and utilized.  First, the lateral port was  established for the arthroscope and then routine examination of the joint was carried out.  She was found to have some undersurface fraying of her patella.  The trochlea was intact.  She had some synovitis that was immediately seen.  Medial compartment was then entered and she was noted to have some wear of her medial femoral condyle and medial tibial plateau cartilage.  Medial meniscus was actually completely intact.  I did establish a medial portal using an outside-in technique and probed her medial meniscus carefully.  Next, she had a large amount of synovitis in her notch region.  This was excised with a Serfas ablation device.  Her PCL was intact.  Her ACL was a little difficult to visualize, however, on manual examination of her knee, her knee was completely stable.  The lateral compartment was a little hard to visualize as well, however, her lateral compartment appeared to be intact.  Her medial and lateral gutters were free of any loose bodies and gentle debridement of her patellar undersurface fraying was carried out as well.  Finally, all instruments removed from the knee and routine closure of the portals was carried out with nylon sutures in the skin.  The wounds were cleaned and dried, and a bulky dry sterile dressing was applied to the patient.  She was awakened from anesthesia and taken to the recovery room in stable condition.        Brittnee Bean MD    D:  07/18/2017 14:09 T:  07/21/2017 17:08  Document:  [id number]\[id number]\TC

## 2017-07-25 ENCOUNTER — TELEPHONE (OUTPATIENT)
Dept: ORTHOPEDICS | Facility: CLINIC | Age: 52
End: 2017-07-25

## 2017-07-31 ENCOUNTER — OFFICE VISIT (OUTPATIENT)
Dept: ORTHOPEDICS | Facility: CLINIC | Age: 52
End: 2017-07-31
Payer: COMMERCIAL

## 2017-07-31 VITALS — HEIGHT: 66 IN | BODY MASS INDEX: 31.18 KG/M2 | WEIGHT: 194 LBS | TEMPERATURE: 97.4 F

## 2017-07-31 DIAGNOSIS — M17.12 PRIMARY OSTEOARTHRITIS OF LEFT KNEE: Primary | ICD-10-CM

## 2017-07-31 PROCEDURE — 99024 POSTOP FOLLOW-UP VISIT: CPT | Performed by: ORTHOPAEDIC SURGERY

## 2017-07-31 ASSESSMENT — PAIN SCALES - GENERAL: PAINLEVEL: NO PAIN (0)

## 2017-07-31 NOTE — NURSING NOTE
"Chief Complaint   Patient presents with     Surgical Followup     post op # 1 dos 7/18/12 left knee arthroscopy with partial medial menisectomy       Initial Temp 97.4  F (36.3  C) (Temporal)  Ht 1.676 m (5' 6\")  Wt 88 kg (194 lb)  LMP 06/14/2017  BMI 31.31 kg/m2 Estimated body mass index is 31.31 kg/(m^2) as calculated from the following:    Height as of this encounter: 1.676 m (5' 6\").    Weight as of this encounter: 88 kg (194 lb).  Medication Reconciliation: complete   ADAMA Fuentes  "

## 2017-07-31 NOTE — LETTER
7/31/2017         RE: Brittnee Herron  [id number] [facility] [address]        Dear Colleague,    Thank you for referring your patient, Brittnee Herron, to the [facility]. Please see a copy of my visit note below.    Orthopedic Clinic Post-Operative Note    CHIEF COMPLAINT:   Chief Complaint   Patient presents with     Surgical Followup     post op # 1 dos 7/18/12 left knee arthroscopy with partial medial menisectomy       HISTORY OF PRESENT ILLNESS  A little buckling, wraps knee at night, no brace or crutches needed, no pain meds needed, has been doing HEP    Patient's past medical, surgical, social and family histories reviewed.     Past Medical History:   Diagnosis Date     Depressive disorder, not elsewhere classified 1982    off meds for a year       Past Surgical History:   Procedure Laterality Date     ARTHROSCOPY KNEE WITH MEDIAL MENISCECTOMY Left 7/18/2017    Procedure: ARTHROSCOPY KNEE WITH MEDIAL MENISCECTOMY;  left knee arthroscopy with partial medial menisectomy;  Surgeon: Brittnee Bean MD;  Location: [facility] OR     C LIGATE FALLOPIAN TUBE,POSTPARTUM  08/03/2002    Postpartum bilateral tubal ligation with partial salpingectomy through a mini laparotomy     HC REMOVAL OF OVARIAN CYST(S)  1990    laparoscopic     HC REMOVE TONSILS/ADENOIDS,<11 Y/O         Medications:    Current Outpatient Prescriptions on File Prior to Visit:  senna-docusate (SENOKOT-S;PERICOLACE) 8.6-50 MG per tablet Take 1-2 tablets by mouth 2 times daily Take while on oral narcotics to prevent or treat constipation.   meloxicam (MOBIC) 15 MG tablet Take 1 tablet (15 mg) by mouth daily   diclofenac (VOLTAREN) 75 MG EC tablet Take 1 tablet (75 mg) by mouth 2 times daily as needed for moderate pain   citalopram (CELEXA) 20 MG tablet Take 1 tablet (20 mg) by mouth daily   HYDROcodone-acetaminophen (NORCO) 5-325 MG per tablet Take 1-2 tablets by mouth every 4 hours as needed for other (Moderate to Severe Pain)  "(Patient not taking: Reported on 7/31/2017)     No current facility-administered medications on file prior to visit.     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[employer]      Social History Main Topics     Smoking status: Never Smoker     Smokeless tobacco: Never Used     Alcohol use No     Drug use: No     Sexual activity: Yes     Partners: Male     Birth control/ protection: Surgical      Comment: PPBTL       Family History   Problem Relation Age of Onset     Arthritis Mother      Hypertension Mother      Alcohol/Drug Maternal Grandmother      alcohol     Alcohol/Drug Maternal Grandfather      alcohol     DIABETES Maternal Grandfather      adult-onset     Depression Brother        REVIEW OF SYSTEMS  General: negative for, night sweats, dizziness, fatigue  Resp: No shortness of breath and no cough  CV: negative for chest pain, syncope or near-syncope  GI: negative for nausea, vomiting and diarrhea  : negative for dysuria and hematuria  Musculoskeletal: as above  Neurologic: negative for syncope   Hematologic: negative for bleeding disorder    Physical Exam:  Vitals: Temp 97.4  F (36.3  C) (Temporal)  Ht 1.676 m (5' 6\")  Wt 88 kg (194 lb)  LMP 06/14/2017  BMI 31.31 kg/m2  BMI= Body mass index is 31.31 kg/(m^2).  Constitutional: healthy, alert and no acute distress   Psychiatric: mentation appears normal and affect normal/bright  NEURO: no focal deficits  SKIN: .without signs of infection including no erythema, incision breakdown or purlent drainage  JOINT/EXTREMITIES: Knee Exam: Inspection: AP/lateral alignment normal, No effusion, No quad atrophy  Tender: wound sites sore  Active Range of Motion: pain with flexion, full extension  Strength: normal      GAIT: antalgic    Diagnostic Modalities:  None today.        Impression: Chief Complaint   Patient presents with     Surgical Followup     post op # 1 dos 7/18/12 left knee " arthroscopy with partial medial menisectomy   13 days out doing well    Plan:   Activity: None, use as tolerated  Staples/Sutures out: Yes.  Pain controlled: Yes. Continue to use: Nothing  Immobilzation: No  Physical Therapy: none at this time.   HEP  Return to clinic 1, month(s), or sooner as needed for changes.    Re-x-ray on return: No    Brittnee Bean M.D.    Again, thank you for allowing me to participate in the care of your patient.        Sincerely,        Brittnee Bean MD

## 2017-07-31 NOTE — PROGRESS NOTES
Orthopedic Clinic Post-Operative Note    CHIEF COMPLAINT:   Chief Complaint   Patient presents with     Surgical Followup     post op # 1 dos 7/18/12 left knee arthroscopy with partial medial menisectomy       HISTORY OF PRESENT ILLNESS  A little buckling, wraps knee at night, no brace or crutches needed, no pain meds needed, has been doing HEP    Patient's past medical, surgical, social and family histories reviewed.     Past Medical History:   Diagnosis Date     Depressive disorder, not elsewhere classified 1982    off meds for a year       Past Surgical History:   Procedure Laterality Date     ARTHROSCOPY KNEE WITH MEDIAL MENISCECTOMY Left 7/18/2017    Procedure: ARTHROSCOPY KNEE WITH MEDIAL MENISCECTOMY;  left knee arthroscopy with partial medial menisectomy;  Surgeon: Brittnee Bean MD;  Location: [facility]     C LIGATE FALLOPIAN TUBE,POSTPARTUM  08/03/2002    Postpartum bilateral tubal ligation with partial salpingectomy through a mini laparotomy     HC REMOVAL OF OVARIAN CYST(S)  1990    laparoscopic     HC REMOVE TONSILS/ADENOIDS,<9 Y/O         Medications:    Current Outpatient Prescriptions on File Prior to Visit:  senna-docusate (SENOKOT-S;PERICOLACE) 8.6-50 MG per tablet Take 1-2 tablets by mouth 2 times daily Take while on oral narcotics to prevent or treat constipation.   meloxicam (MOBIC) 15 MG tablet Take 1 tablet (15 mg) by mouth daily   diclofenac (VOLTAREN) 75 MG EC tablet Take 1 tablet (75 mg) by mouth 2 times daily as needed for moderate pain   citalopram (CELEXA) 20 MG tablet Take 1 tablet (20 mg) by mouth daily   HYDROcodone-acetaminophen (NORCO) 5-325 MG per tablet Take 1-2 tablets by mouth every 4 hours as needed for other (Moderate to Severe Pain) (Patient not taking: Reported on 7/31/2017)     No current facility-administered medications on file prior to visit.     Allergies   Allergen Reactions     No Known Drug Allergies        Social History     Occupational History     Nurses  "[employer]      Social History Main Topics     Smoking status: Never Smoker     Smokeless tobacco: Never Used     Alcohol use No     Drug use: No     Sexual activity: Yes     Partners: Male     Birth control/ protection: Surgical      Comment: PPBTL       Family History   Problem Relation Age of Onset     Arthritis Mother      Hypertension Mother      Alcohol/Drug Maternal Grandmother      alcohol     Alcohol/Drug Maternal Grandfather      alcohol     DIABETES Maternal Grandfather      adult-onset     Depression Brother        REVIEW OF SYSTEMS  General: negative for, night sweats, dizziness, fatigue  Resp: No shortness of breath and no cough  CV: negative for chest pain, syncope or near-syncope  GI: negative for nausea, vomiting and diarrhea  : negative for dysuria and hematuria  Musculoskeletal: as above  Neurologic: negative for syncope   Hematologic: negative for bleeding disorder    Physical Exam:  Vitals: Temp 97.4  F (36.3  C) (Temporal)  Ht 1.676 m (5' 6\")  Wt 88 kg (194 lb)  LMP 06/14/2017  BMI 31.31 kg/m2  BMI= Body mass index is 31.31 kg/(m^2).  Constitutional: healthy, alert and no acute distress   Psychiatric: mentation appears normal and affect normal/bright  NEURO: no focal deficits  SKIN: .without signs of infection including no erythema, incision breakdown or purlent drainage  JOINT/EXTREMITIES: Knee Exam: Inspection: AP/lateral alignment normal, No effusion, No quad atrophy  Tender: wound sites sore  Active Range of Motion: pain with flexion, full extension  Strength: normal      GAIT: antalgic    Diagnostic Modalities:  None today.        Impression: Chief Complaint   Patient presents with     Surgical Followup     post op # 1 dos 7/18/12 left knee arthroscopy with partial medial menisectomy   13 days out doing well    Plan:   Activity: None, use as tolerated  Staples/Sutures out: Yes.  Pain controlled: Yes. Continue to use: Nothing  Immobilzation: No  Physical Therapy: none at this time. "   HEP  Return to clinic 1, month(s), or sooner as needed for changes.    Re-x-ray on return: No    Brittnee Bean M.D.

## 2017-07-31 NOTE — MR AVS SNAPSHOT
After Visit Summary   7/31/2017    Brittnee Herron    MRN: [id number]           Patient Information     Date Of Birth          1965        Visit Information        Provider Department      7/31/2017 2:00 PM Brittnee Bean MD [facility]        Today's Diagnoses     Primary osteoarthritis of left knee    -  1      Care Instructions      Gastrocnemius Stretch (Flexibility)    1. Stand facing a wall from 3 feet away. Take one step toward the wall with your right foot.  2. Place both palms on the wall. Bend your right knee.  3. Lean forward, keeping the left leg straight and the left heel on the floor.  4. Hold for 30 to 60 seconds. Repeat 2 times, or as instructed.  5. Switch legs and repeat.  Date Last Reviewed: 3/10/2016    3350-9890 [employer]. [address] [place] [address]. All rights reserved. This information is not intended as a substitute for professional medical care. Always follow your healthcare professional's instructions.        Hamstring Stretch    Begin your rehabilitation with exercises that develop muscle control. These help you meet basic goals, like driving a car or going back to work. Exercise as often as you re advised. But stop right away if any exercise causes sharp or increasing pain. Icing your knee for 15 to 20 minutes after exercise can help prevent swelling and soreness.    Lie on your back with your good knee bent. Put a towel around the back of your injured leg. Tighten your stomach muscles.    Keeping the knee as straight as you can, slowly pull on the towel to bring your injured leg up. Raise it as far as you comfortably can.    Hold for 30 to 60 seconds. Repeat 2 to 3 times.   Caution: If you feel tingling or pain in your back or legs, you re not yet ready for this exercise or are pulling too aggressively.   For your safety, check with your healthcare provider before starting an exercise program.   Date Last Reviewed:  8/16/2015 2000-2017 [employer]. [address] [place] [address]. All rights reserved. This information is not intended as a substitute for professional medical care. Always follow your healthcare professional's instructions.        Quad Set for Leg and Knee    This exercise is designed to stretch and strengthen your knee. Before beginning, read through all the instructions. While exercising, breathe normally and use smooth movements. If you feel any pain, stop the exercise. If pain persists, call your healthcare provider.  1.  Sit on the floor with one leg straight, the other bent.  2.  Flex the foot of your straight leg by pointing your toes toward you. Press the back of your knee into the floor while tightening the muscle on the top of your thigh. Hold for ______ seconds. Then relax.  3.  Repeat ______ times. Do ______ sets a day.  Caution    Don t arch your back.    Don t hunch your shoulders.  Date Last Reviewed: 9/20/2015 2000-2017 [employer]. [address] [place] [address]. All rights reserved. This information is not intended as a substitute for professional medical care. Always follow your healthcare professional's instructions.        Leg and Knee Exercises: Leg Raise    This exercise is designed to stretch and strengthen your knee. Before beginning, read through all the instructions. While exercising, breathe normally and use smooth movements. If you feel any pain, stop the exercise. If pain persists, call your healthcare provider.  Caution    Don t arch your back.    Don t hunch your shoulders.     Sit on the floor with your_________ leg straight, the other bent.    Tighten the thigh muscles on the top of your straight leg. You should feel the muscles contract. Raise that leg 6-8 inches. Then lower it slowly and steadily to the floor. Relax.    Repeat ______ times.  Do ______ sets a day.  Date Last Reviewed: 8/16/2015 2000-2017 [employer]  "[employer]. [address] [place] [address]. All rights reserved. This information is not intended as a substitute for professional medical care. Always follow your healthcare professional's instructions.                Follow-ups after your visit        Who to contact     If you have questions or need follow up information about today's clinic visit or your schedule please contact [facility] directly at [phone].  Normal or non-critical lab and imaging results will be communicated to you by Qoolhart, letter or phone within 4 business days after the clinic has received the results. If you do not hear from us within 7 days, please contact the clinic through Qoolhart or phone. If you have a critical or abnormal lab result, we will notify you by phone as soon as possible.  Submit refill requests through Teliris or call your pharmacy and they will forward the refill request to us. Please allow 3 business days for your refill to be completed.          Additional Information About Your Visit        QoolharCallmyName Information     Teliris lets you send messages to your doctor, view your test results, renew your prescriptions, schedule appointments and more. To sign up, go to [email or web address] . Click on \"Log in\" on the left side of the screen, which will take you to the Welcome page. Then click on \"Sign up Now\" on the right side of the page.     You will be asked to enter the access code listed below, as well as some personal information. Please follow the directions to create your username and password.     Your access code is: [email or web address]  Expires: 2017  4:39 PM     Your access code will  in 90 days. If you need help or a new code, please call your [place] clinic or [phone].        Care EveryWhere ID     This is your Care EveryWhere ID. This could be used by other organizations to access your [place] medical records  [id number]        Your Vitals Were     Temperature Height " "Last Period BMI (Body Mass Index)          97.4  F (36.3  C) (Temporal) 1.676 m (5' 6\") 06/14/2017 31.31 kg/m2         Blood Pressure from Last 3 Encounters:   07/18/17 (!) 158/97   07/05/17 128/84   04/28/17 130/86    Weight from Last 3 Encounters:   07/31/17 88 kg (194 lb)   07/05/17 88.9 kg (196 lb)   07/03/17 88.9 kg (196 lb)              Today, you had the following     No orders found for display       Primary Care Provider Office Phone # Fax #    [address] [phone]       [facility] [address] [facility] [address]        Equal Access to Services     SHADY HELM : Felisa Mcneil, waaxda luqadaha, qaybta kaalmada adeegyacurry, angel bhakta . So [facility] [phone].    ATENCIÓN: Si habla español, tiene a shine disposición servicios gratuitos de asistencia lingüística. Live al [phone].    We comply with applicable federal civil rights laws and [place] laws. We do not discriminate on the basis of race, color, national origin, age, disability sex, sexual orientation or gender identity.            Thank you!     Thank you for choosing [facility]  for your care. Our goal is always to provide you with excellent care. Hearing back from our patients is one way we can continue to improve our services. Please take a few minutes to complete the written survey that you may receive in the mail after your visit with us. Thank you!             Your Updated Medication List - Protect others around you: Learn how to safely use, store and throw away your medicines at www.disposemymeds.org.          This list is accurate as of: 7/31/17  2:32 PM.  Always use your most recent med list.                   Brand Name Dispense Instructions for use Diagnosis    citalopram 20 MG tablet    celeXA    90 tablet    Take 1 tablet (20 mg) by mouth daily    Mild recurrent major depression (H)       diclofenac 75 MG EC tablet    VOLTAREN "    30 tablet    Take 1 tablet (75 mg) by mouth 2 times daily as needed for moderate pain    Synovitis of knee       HYDROcodone-acetaminophen 5-325 MG per tablet    NORCO    50 tablet    Take 1-2 tablets by mouth every 4 hours as needed for other (Moderate to Severe Pain)    S/P left knee arthroscopy       meloxicam 15 MG tablet    MOBIC    30 tablet    Take 1 tablet (15 mg) by mouth daily    S/P left knee arthroscopy       senna-docusate 8.6-50 MG per tablet    SENOKOT-S;PERICOLACE    50 tablet    Take 1-2 tablets by mouth 2 times daily Take while on oral narcotics to prevent or treat constipation.    S/P left knee arthroscopy

## 2017-08-01 DIAGNOSIS — M65.969 SYNOVITIS OF KNEE: ICD-10-CM

## 2017-08-01 DIAGNOSIS — Z98.890 S/P LEFT KNEE ARTHROSCOPY: ICD-10-CM

## 2017-08-02 RX ORDER — HYDROCODONE BITARTRATE AND ACETAMINOPHEN 5; 325 MG/1; MG/1
1-2 TABLET ORAL EVERY 4 HOURS PRN
Qty: 50 TABLET | Refills: 0 | OUTPATIENT
Start: 2017-08-02

## 2017-08-02 RX ORDER — DICLOFENAC SODIUM 75 MG/1
75 TABLET, DELAYED RELEASE ORAL 2 TIMES DAILY PRN
Qty: 30 TABLET | Refills: 1 | OUTPATIENT
Start: 2017-08-02

## 2017-08-02 NOTE — TELEPHONE ENCOUNTER
diclofenac      Last Written Prescription Date: 06/27/2017  Last Fill Quantity: 30,  # refills: 1   Last Office Visit with [facility], [facility] or [facility] prescribing provider: 07/31/2017    norco      Last Written Prescription Date: 07/18/17  Last Fill Quantity: 50,  # refills: 0   Last Office Visit with [facility], [facility] or [facility] prescribing provider: 07/31/2017    Thank You,  Lance Smith, Pharmacy [facility]

## 2017-08-02 NOTE — TELEPHONE ENCOUNTER
This patient was seen 2 days ago and it was documented she did not need medication at that time.  I feel this must be a mistake.  I tried to call her to confirm this but she did not answer and the mailbox is full.

## 2017-08-02 NOTE — TELEPHONE ENCOUNTER
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  I will deny these for now and will have her contact us if she needs more medication.  She did not 2 days ago.

## 2017-08-07 RX ORDER — HYDROCODONE BITARTRATE AND ACETAMINOPHEN 5; 325 MG/1; MG/1
1-2 TABLET ORAL EVERY 4 HOURS PRN
Qty: 50 TABLET | Refills: 0 | Status: SHIPPED | OUTPATIENT
Start: 2017-08-07 | End: 2017-09-20

## 2017-08-07 RX ORDER — DICLOFENAC SODIUM 75 MG/1
75 TABLET, DELAYED RELEASE ORAL 2 TIMES DAILY PRN
Qty: 30 TABLET | Refills: 1 | Status: SHIPPED | OUTPATIENT
Start: 2017-08-07 | End: 2017-09-20

## 2017-08-07 NOTE — TELEPHONE ENCOUNTER
Please call patient at [phone].    Patient called regarding message below and the medication, she would like a refill for both medications please. Call with any questions.  Thank you,  Josette.

## 2017-09-20 ENCOUNTER — OFFICE VISIT (OUTPATIENT)
Dept: ORTHOPEDICS | Facility: CLINIC | Age: 52
End: 2017-09-20
Payer: COMMERCIAL

## 2017-09-20 VITALS — HEIGHT: 66 IN | WEIGHT: 197 LBS | TEMPERATURE: 97 F | BODY MASS INDEX: 31.66 KG/M2

## 2017-09-20 DIAGNOSIS — M17.12 PRIMARY OSTEOARTHRITIS OF LEFT KNEE: ICD-10-CM

## 2017-09-20 DIAGNOSIS — Z51.89 AFTER CARE: Primary | ICD-10-CM

## 2017-09-20 PROCEDURE — 99024 POSTOP FOLLOW-UP VISIT: CPT | Performed by: ORTHOPAEDIC SURGERY

## 2017-09-20 PROCEDURE — 20610 DRAIN/INJ JOINT/BURSA W/O US: CPT | Mod: 79 | Performed by: ORTHOPAEDIC SURGERY

## 2017-09-20 RX ORDER — METHYLPREDNISOLONE 4 MG
TABLET, DOSE PACK ORAL
Qty: 21 TABLET | Refills: 0 | Status: SHIPPED | OUTPATIENT
Start: 2017-09-20 | End: 2018-07-18

## 2017-09-20 RX ORDER — ETODOLAC 400 MG
400 TABLET ORAL 2 TIMES DAILY
Qty: 60 TABLET | Refills: 1 | Status: SHIPPED | OUTPATIENT
Start: 2017-09-20 | End: 2017-12-19

## 2017-09-20 ASSESSMENT — PAIN SCALES - GENERAL: PAINLEVEL: SEVERE PAIN (6)

## 2017-09-20 NOTE — MR AVS SNAPSHOT
After Visit Summary   9/20/2017    Brittnee Herron    MRN: [id number]           Patient Information     Date Of Birth          1965        Visit Information        Provider Department      9/20/2017 4:00 PM Brittnee Bean MD [facility]        Today's Diagnoses     After care    -  1    Primary osteoarthritis of left knee          Care Instructions    Encounter Diagnoses   Name Primary?     Primary osteoarthritis of left knee      After care Yes     Rest, ice and elevate above heart level as needed for pain control  Plan:  1. Arthroscopy Photos: Not needed.    2. Incision(s): Healed  3. Anticoagulation: Not needed.    4. Pain Medication: We did a medrol dose pack, after that is done you can try the Lordine medication that I ordered. We sent this to your [place] pharmacy.  5. Weight Bearing: weight bearing as tolerated  6. Activity/Therapy: continue gentle exercises. Stay as strong, mobile and active as possible.   7. Brace/Sling: you have a large change knee brace and a knee sleeve. We decided to give you a shorter smaller hinged knee brace that might work better for you.  8. Work: you are looking at changing jobs where you would be doing a lot less walking with the new job. We are hoping this pans out for you.  9. We know that it is your arthritis is given you most of the pain, we decided to a cortisone injection.  Follow-up:  You can followup with Brittnee Bean MD in 6 weeks, if you are having pain at that time we can do a cortisone injection.  You can always cancel the appointment if you are doing really well and follow-up as needed.   Cortisone Instructions:     1. You received an injection of cortisone into your left knee today.  2. The joint(s) may be more painful for the first 1-2 days.  3. We ask you to continue to rest the joint(s) for a few more days before resuming regular activities.  4. Pain Medications you can take (as long as your primary care provider  allows these meds and you do not have kidney or liver conditions):  Tylenol  Take 1000 mg by mouth every 6 hours as needed; maximum dose 4000 mg a day  Ibuprofen  600 mg every 6 hours as needed; maximum 2400 mg a day  (OK to take tylenol and ibuprofen at the same time)  5. Rest, ice and elevate as needed for pain control  6. Watch for these signs of infection: redness, swelling, drainage, warmth to touch, increased pain, or fever. Call the clinic or make an appointment to be seen if you think you have an infection.  7. If you are diabetic, make sure you keep a close eye on your blood sugars, they can get elevated with cortisone injections.   8. Sometimes it can take 1-2 weeks for it to reach its full effect.    Cortisone Injections  Cortisone is a type of steroid. It can greatly reduce swelling, redness, and irritation (inflammation) and pain. Being injected with cortisone is simple and doesn t take long. Your doctor may ask you questions about your health. Certain health conditions, such as diabetes, can be affected by cortisone.     Your pain may be relieved by a cortisone injection.   Why have a cortisone injection?  Injecting cortisone can relieve pain for anything from a sports injury to arthritis. Your doctor may suggest an injection if rest, splints, or oral medicine doesn t relieve your pain. Injecting cortisone is simpler than having surgery. And cortisone may provide the lasting pain relief that can help you get out and enjoy life again.  Getting the injection  Your doctor will start by cleaning and occasionally numbing your skin at the injection site. Next you ll be injected with local anesthetics (for short-term pain relief) and cortisone. The injection may last a few moments. A small bandage will be put over the injection site. You ll then be ready to go home.  After your injection  After being injected, make sure you don t injure the treated region. But stay active. Enjoy a walk or some other mild  activity. Just be careful not to strain the region that gave you trouble.  The next day  Some people feel more pain after being injected. This is normal, and it will go away soon. Applying ice for 20 minutes at a time to your injury may reduce the increased pain. Rest for the first day or two. You don t need to stay in bed. But avoid tasks that may strain the injured region.  If you have diabetes  Cortisone injections can cause blood sugar to be increased for several days after the injection. If you have diabetes, you should follow your blood  sugar closely during this time. Follow your regular plan for what to do when your blood sugar is elevated.     8524-6032 [employer] [address] [place] [address], [place] [address]. All rights reserved. This information is not intended as a substitute for professional medical care. Always follow your healthcare professional's instructions.       [employer] and [employer] may offer reliable information regarding your diagnosis and treatment plan.    THANK YOU for coming in today. If you receive a survey via [employer] or mail please let us know if there was anything you especially appreciated today or if there is any way we can improve our clinic. We appreciate your input.    GENERAL INFORMATION:  Our hours are:  Monday :     Clinic 7:30 AM-430 PM ([facility])  Tuesday:      Operating Room All Day ([facility])  Wednesday: Clinic 7:30 AM - 11:15 AM ([facility])                        Clinic 1:00 PM - 4:00PM ([facility])  Thursday:     Administrative Day  Friday:          Clinic 7:30 AM - 11:15 AM ([facility])                       Clinic 1:00 PM - 4:00 PM ([facility])    Bone and Joint Service Line for any issues or concerns: [phone]    We are not in the office Thursdays. Therefore non- urgent calls and medical messages received on Thursday  "will be addressed when we are back in the office on Wednesday. Urgent matters will be reviewed and addressed by one of our partners in the office as needed.    If lab work was done today as part of your evaluation you will generally be contacted via [employer], mail, or phone with the results within 1-5 days. If there is an alarming result we will contact you by phone. Lab results come back at varying times, I generally wait until all labs are resulted before making comments on results. Please note labs are automatically released to [employer] (if you have signed up for it) once available-at times you may see these prior to my having a chance to review them as well.    If you need refills please contact your pharmacist. They will send a refill request to me to review. Please allow 3 business days for us to process all refill requests. All narcotic refills should be handled in the clinic at the time of your visit.            Follow-ups after your visit        Who to contact     If you have questions or need follow up information about today's clinic visit or your schedule please contact [facility] directly at [phone].  Normal or non-critical lab and imaging results will be communicated to you by [employer], letter or phone within 4 business days after the clinic has received the results. If you do not hear from us within 7 days, please contact the clinic through [employer] or phone. If you have a critical or abnormal lab result, we will notify you by phone as soon as possible.  Submit refill requests through [employer] or call your pharmacy and they will forward the refill request to us. Please allow 3 business days for your refill to be completed.          Additional Information About Your Visit        [employer] Information     [employer] lets you send messages to your doctor, view your test results, renew your prescriptions, schedule appointments and more. To sign up, go to [email or web address] . Click on \"Log " "in\" on the left side of the screen, which will take you to the Welcome page. Then click on \"Sign up Now\" on the right side of the page.     You will be asked to enter the access code listed below, as well as some personal information. Please follow the directions to create your username and password.     Your access code is: [email or web address]  Expires: 2017  8:46 AM     Your access code will  in 90 days. If you need help or a new code, please call your [facility] clinic or [phone].        Care EveryWhere ID     This is your Care EveryWhere ID. This could be used by other organizations to access your [facility] medical records  [id number]        Your Vitals Were     Temperature Height BMI (Body Mass Index)             97  F (36.1  C) 1.676 m (5' 6\") 31.8 kg/m2          Blood Pressure from Last 3 Encounters:   17 (!) 158/97   17 128/84   17 130/86    Weight from Last 3 Encounters:   17 89.4 kg (197 lb)   17 88 kg (194 lb)   17 88.9 kg (196 lb)              Today, you had the following     No orders found for display         Today's Medication Changes          These changes are accurate as of: 17  4:38 PM.  If you have any questions, ask your nurse or doctor.               Start taking these medicines.        Dose/Directions    etodolac 400 MG tablet   Commonly known as:  LODINE   Used for:  Primary osteoarthritis of left knee   Started by:  Brittnee Bean MD        Dose:  400 mg   Take 1 tablet (400 mg) by mouth 2 times daily   Quantity:  60 tablet   Refills:  1       methylPREDNISolone 4 MG tablet   Commonly known as:  MEDROL DOSEPAK   Used for:  Primary osteoarthritis of left knee   Started by:  Brittnee Bean MD        Follow package instructions   Quantity:  21 tablet   Refills:  0            Where to get your medicines      These medications were sent to [facility] [place], [place] - [address]  [address] , [facility] [address]     Phone:  " [phone]     etodolac 400 MG tablet    methylPREDNISolone 4 MG tablet                Primary Care Provider Office Phone # Fax #    [address] [phone]       [address] [facility] [address]        Equal Access to Services     SHADY HELM : Hadii andrew arboleda hadannieo Soomaali, waaxda luqadaha, qaybta kaalmada adeegyada, waxmonica dialloángeljose maria robertson. So [facility] [phone].    ATENCIÓN: Si habla español, tiene a shine disposición servicios gratuitos de asistencia lingüística. Llame al [phone].    We comply with applicable federal civil rights laws and [place] laws. We do not discriminate on the basis of race, color, national origin, age, disability sex, sexual orientation or gender identity.            Thank you!     Thank you for choosing [facility]  for your care. Our goal is always to provide you with excellent care. Hearing back from our patients is one way we can continue to improve our services. Please take a few minutes to complete the written survey that you may receive in the mail after your visit with us. Thank you!             Your Updated Medication List - Protect others around you: Learn how to safely use, store and throw away your medicines at www.disposemymeds.org.          This list is accurate as of: 9/20/17  4:38 PM.  Always use your most recent med list.                   Brand Name Dispense Instructions for use Diagnosis    citalopram 20 MG tablet    celeXA    90 tablet    Take 1 tablet (20 mg) by mouth daily    Mild recurrent major depression (H)       diclofenac 75 MG EC tablet    VOLTAREN    30 tablet    Take 1 tablet (75 mg) by mouth 2 times daily as needed for moderate pain    Synovitis of knee       etodolac 400 MG tablet    LODINE    60 tablet    Take 1 tablet (400 mg) by mouth 2 times daily    Primary osteoarthritis of left knee       HYDROcodone-acetaminophen 5-325 MG per tablet    NORCO    50 tablet    Take 1-2 tablets by  mouth every 4 hours as needed for other (Moderate to Severe Pain)    S/P left knee arthroscopy       meloxicam 15 MG tablet    MOBIC    30 tablet    Take 1 tablet (15 mg) by mouth daily    S/P left knee arthroscopy       methylPREDNISolone 4 MG tablet    MEDROL DOSEPAK    21 tablet    Follow package instructions    Primary osteoarthritis of left knee       senna-docusate 8.6-50 MG per tablet    SENOKOT-S;PERICOLACE    50 tablet    Take 1-2 tablets by mouth 2 times daily Take while on oral narcotics to prevent or treat constipation.    S/P left knee arthroscopy

## 2017-09-20 NOTE — PATIENT INSTRUCTIONS
Encounter Diagnoses   Name Primary?     Primary osteoarthritis of left knee      After care Yes     Rest, ice and elevate above heart level as needed for pain control  Plan:  1. Arthroscopy Photos: Not needed.    2. Incision(s): Healed  3. Anticoagulation: Not needed.    4. Pain Medication: We did a medrol dose pack, after that is done you can try the Lordine medication that I ordered. We sent this to your [place] pharmacy.  5. Weight Bearing: weight bearing as tolerated  6. Activity/Therapy: continue gentle exercises. Stay as strong, mobile and active as possible.   7. Brace/Sling: you have a large change knee brace and a knee sleeve. We decided to give you a shorter smaller hinged knee brace that might work better for you.  8. Work: you are looking at changing jobs where you would be doing a lot less walking with the new job. We are hoping this pans out for you.  9. We know that it is your arthritis is given you most of the pain, we decided to a cortisone injection.  Follow-up:  You can followup with Brittnee Bean MD in 6 weeks, if you are having pain at that time we can do a cortisone injection.  You can always cancel the appointment if you are doing really well and follow-up as needed.   Cortisone Instructions:     1. You received an injection of cortisone into your left knee today.  2. The joint(s) may be more painful for the first 1-2 days.  3. We ask you to continue to rest the joint(s) for a few more days before resuming regular activities.  4. Pain Medications you can take (as long as your primary care provider allows these meds and you do not have kidney or liver conditions):  Tylenol  Take 1000 mg by mouth every 6 hours as needed; maximum dose 4000 mg a day  Ibuprofen  600 mg every 6 hours as needed; maximum 2400 mg a day  (OK to take tylenol and ibuprofen at the same time)  5. Rest, ice and elevate as needed for pain control  6. Watch for these signs of infection: redness, swelling, drainage, warmth  to touch, increased pain, or fever. Call the clinic or make an appointment to be seen if you think you have an infection.  7. If you are diabetic, make sure you keep a close eye on your blood sugars, they can get elevated with cortisone injections.   8. Sometimes it can take 1-2 weeks for it to reach its full effect.    Cortisone Injections  Cortisone is a type of steroid. It can greatly reduce swelling, redness, and irritation (inflammation) and pain. Being injected with cortisone is simple and doesn t take long. Your doctor may ask you questions about your health. Certain health conditions, such as diabetes, can be affected by cortisone.     Your pain may be relieved by a cortisone injection.   Why have a cortisone injection?  Injecting cortisone can relieve pain for anything from a sports injury to arthritis. Your doctor may suggest an injection if rest, splints, or oral medicine doesn t relieve your pain. Injecting cortisone is simpler than having surgery. And cortisone may provide the lasting pain relief that can help you get out and enjoy life again.  Getting the injection  Your doctor will start by cleaning and occasionally numbing your skin at the injection site. Next you ll be injected with local anesthetics (for short-term pain relief) and cortisone. The injection may last a few moments. A small bandage will be put over the injection site. You ll then be ready to go home.  After your injection  After being injected, make sure you don t injure the treated region. But stay active. Enjoy a walk or some other mild activity. Just be careful not to strain the region that gave you trouble.  The next day  Some people feel more pain after being injected. This is normal, and it will go away soon. Applying ice for 20 minutes at a time to your injury may reduce the increased pain. Rest for the first day or two. You don t need to stay in bed. But avoid tasks that may strain the injured region.  If you have  diabetes  Cortisone injections can cause blood sugar to be increased for several days after the injection. If you have diabetes, you should follow your blood  sugar closely during this time. Follow your regular plan for what to do when your blood sugar is elevated.     9136-7723 [employer]. [address] [place] [address]. All rights reserved. This information is not intended as a substitute for professional medical care. Always follow your healthcare professional's instructions.       [employer] and [employer] may offer reliable information regarding your diagnosis and treatment plan.    THANK YOU for coming in today. If you receive a survey via [employer] or mail please let us know if there was anything you especially appreciated today or if there is any way we can improve our clinic. We appreciate your input.    GENERAL INFORMATION:  Our hours are:  Monday :     Clinic 7:30 AM-430 PM ([facility])  Tuesday:      Operating Room All Day ([facility])  Wednesday: Clinic 7:30 AM - 11:15 AM ([facility])                        Clinic 1:00 PM - 4:00PM ([facility])  Thursday:     Administrative Day  Friday:          Clinic 7:30 AM - 11:15 AM ([facility])                       Clinic 1:00 PM - 4:00 PM ([facility])    Bone and Joint Service Line for any issues or concerns: [phone]    We are not in the office Thursdays. Therefore non- urgent calls and medical messages received on Thursday will be addressed when we are back in the office on Wednesday. Urgent matters will be reviewed and addressed by one of our partners in the office as needed.    If lab work was done today as part of your evaluation you will generally be contacted via [employer], mail, or phone with the results within 1-5 days. If there is an alarming result we will contact you by phone. Lab results come back  at varying times, I generally wait until all labs are resulted before making comments on results. Please note labs are automatically released to [employer] (if you have signed up for it) once available-at times you may see these prior to my having a chance to review them as well.    If you need refills please contact your pharmacist. They will send a refill request to me to review. Please allow 3 business days for us to process all refill requests. All narcotic refills should be handled in the clinic at the time of your visit.

## 2017-09-20 NOTE — PROGRESS NOTES
Orthopedic Clinic Post-Operative Note    CHIEF COMPLAINT:   Chief Complaint   Patient presents with     Surgical Followup     post op # 2 dos 7/18/12 left knee arthroscopy with partial medial menisectomy       HISTORY OF PRESENT ILLNESS  Location: left knee  Rating of Pain: 6 out of 10  Pain is better with: rest  Pain improving overall: no  Associated Features: pain with ambulation, stiffness, very intermittent catching  Patient concerns: wondering about Medrol Dosepak or other medications she can take.  Additional History: patient is trying to get another job within a company where she would be walking a lot less. Patient is not taking full Giancarlo or Mobic or Norco anymore. She is only taking Advil. Patient is wearing a bulky hinged knee brace that helps some.    Patient's past medical, surgical, social and family histories reviewed.     Past Medical History:   Diagnosis Date     Depressive disorder, not elsewhere classified 1982    off meds for a year       Past Surgical History:   Procedure Laterality Date     ARTHROSCOPY KNEE WITH MEDIAL MENISCECTOMY Left 7/18/2017    Procedure: ARTHROSCOPY KNEE WITH MEDIAL MENISCECTOMY;  left knee arthroscopy with partial medial menisectomy;  Surgeon: Brittnee Bean MD;  Location: [facility]     C LIGATE FALLOPIAN TUBE,POSTPARTUM  08/03/2002    Postpartum bilateral tubal ligation with partial salpingectomy through a mini laparotomy     HC REMOVAL OF OVARIAN CYST(S)  1990    laparoscopic     HC REMOVE TONSILS/ADENOIDS,<13 Y/O         Medications:    Current Outpatient Prescriptions on File Prior to Visit:  HYDROcodone-acetaminophen (NORCO) 5-325 MG per tablet Take 1-2 tablets by mouth every 4 hours as needed for other (Moderate to Severe Pain)   diclofenac (VOLTAREN) 75 MG EC tablet Take 1 tablet (75 mg) by mouth 2 times daily as needed for moderate pain   senna-docusate (SENOKOT-S;PERICOLACE) 8.6-50 MG per tablet Take 1-2 tablets by mouth 2 times daily Take while on oral  "narcotics to prevent or treat constipation.   meloxicam (MOBIC) 15 MG tablet Take 1 tablet (15 mg) by mouth daily   citalopram (CELEXA) 20 MG tablet Take 1 tablet (20 mg) by mouth daily     No current facility-administered medications on file prior to visit.     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      Social History Main Topics     Smoking status: Never Smoker     Smokeless tobacco: Never Used     Alcohol use No     Drug use: No     Sexual activity: Yes     Partners: Male     Birth control/ protection: Surgical      Comment: PPBTL       Family History   Problem Relation Age of Onset     Arthritis Mother      Hypertension Mother      Alcohol/Drug Maternal Grandmother      alcohol     Alcohol/Drug Maternal Grandfather      alcohol     DIABETES Maternal Grandfather      adult-onset     Depression Brother        REVIEW OF SYSTEMS  General: negative for, night sweats, dizziness, fatigue  Resp: No shortness of breath and no cough  CV: negative for chest pain, syncope or near-syncope  GI: negative for nausea, vomiting and diarrhea  : negative for dysuria and hematuria  Musculoskeletal: as above  Neurologic: negative for syncope   Hematologic: negative for bleeding disorder    Physical Exam:  Vitals: Temp 97  F (36.1  C)  Ht 1.676 m (5' 6\")  Wt 89.4 kg (197 lb)  BMI 31.8 kg/m2  BMI= Body mass index is 31.8 kg/(m^2).  Constitutional: healthy, alert and no acute distress   Psychiatric: mentation appears normal and affect normal/bright  NEURO: no focal deficits, CMS intact left lower extremity  RESP: Normal with easy respirations and no use of accessory muscles to breathe, no audible wheezing or retractions  CV: Calf soft and nontender to palpation, leg warm   SKIN: No erythema, rashes, excoriation, or breakdown. No evidence of infection.   MUSCULOSKELETAL:    INSPECTION of left knee: No gross deformities, erythema, edema, " ecchymosis, atrophy or fasciculations.     PALPATION: No tenderness on palpation of the medial, lateral, anterior and posterior portion of the knee. No specific joint line tenderness. No increased warmth.     ROM: Extension full, flexion to approximately 125 . All range of motion without catching, locking or pain.       STRENGTH: 5 out of 5 quad and hamstring strength.     SPECIAL TEST: Patient has a negative Lachman's negative drawer sign. Patient's knee is stable to varus and valgus stress at 30  of flexion. Patient has a negative Pema's.   GAIT: non-antalgic, did have antalgic gait after the injection.  Lymph: no palpable lymph nodes    Diagnostic Modalities:  not needed today.                                          INJECTION PROCEDURE:  The patient was counseled about an  injection, including discussion of risks (including infection), contents of the injection, rationale for performing the injection, and expected benefits of the injection. The skin was prepped with alcohol and betadine and then utilizing sterile technique an injection of the left knee joint from the anterolateral approach in the seated position was performed. I used raudel chloride spray prior to doing the injection. The injection consisted 1ml of Kenalog (40mg per 1ml) with 8ml 1% lidocaine plain. The patient tolerated the injection well, and there were no complications. The injection site was covered with a Band-Aid. The injection was performed by NIKHIL Rodriguez      Impression: 1. 2 months 3 days status post left knee arthroscopic partial medial meniscectomy, synovectomy, chondral debridement. 2. Left knee degenerative joint disease      Patient Instructions:   Plan:  1. Arthroscopy Photos: Not needed.    2. Incision(s): Healed  3. Anticoagulation: Not needed.    4. Pain Medication: We did a medrol dose pack, after that is done you can try the Lordine medication that I ordered. We sent this to your [facility] pharmacy.  5. Weight Bearing:  weight bearing as tolerated  6. Activity/Therapy: continue gentle exercises. Stay as strong, mobile and active as possible.   7. Brace/Sling: you have a large change knee brace and a knee sleeve. We decided to give you a shorter smaller hinged knee brace that might work better for you.  8. Work: you are looking at changing jobs where you would be doing a lot less walking with the new job. We are hoping this pans out for you.  9. We know that it is your arthritis is given you most of the pain, we decided to a cortisone injection.  Follow-up:  You can followup with Brittnee Bean MD in 6 weeks, if you are having pain at that time we can do a cortisone injection.  You can always cancel the appointment if you are doing really well and follow-up as needed  Re-x-ray on return: No    Scribed by Armando Sharpe PA-C on 9/20/2017 at 4:40 PM, based on Dr. Brittnee Bean's statements to me.    This note was dictated with [employer].    MERVIN Biswas MD

## 2017-09-20 NOTE — LETTER
9/20/2017         RE: Brittnee Herron  [id number] [facility] [address]        Dear Colleague,    Thank you for referring your patient, Brittnee Herron, to the [facility]. Please see a copy of my visit note below.    Orthopedic Clinic Post-Operative Note    CHIEF COMPLAINT:   Chief Complaint   Patient presents with     Surgical Followup     post op # 2 dos 7/18/12 left knee arthroscopy with partial medial menisectomy       HISTORY OF PRESENT ILLNESS  Location: left knee  Rating of Pain: 6 out of 10  Pain is better with: rest  Pain improving overall: no  Associated Features: pain with ambulation, stiffness, very intermittent catching  Patient concerns: wondering about Medrol Dosepak or other medications she can take.  Additional History: patient is trying to get another job within a company where she would be walking a lot less. Patient is not taking full Giancarlo or Mobic or Norco anymore. She is only taking Advil. Patient is wearing a bulky hinged knee brace that helps some.    Patient's past medical, surgical, social and family histories reviewed.     Past Medical History:   Diagnosis Date     Depressive disorder, not elsewhere classified 1982    off meds for a year       Past Surgical History:   Procedure Laterality Date     ARTHROSCOPY KNEE WITH MEDIAL MENISCECTOMY Left 7/18/2017    Procedure: ARTHROSCOPY KNEE WITH MEDIAL MENISCECTOMY;  left knee arthroscopy with partial medial menisectomy;  Surgeon: Brittnee Bean MD;  Location: [facility]     C LIGATE FALLOPIAN TUBE,POSTPARTUM  08/03/2002    Postpartum bilateral tubal ligation with partial salpingectomy through a mini laparotomy     HC REMOVAL OF OVARIAN CYST(S)  1990    laparoscopic     HC REMOVE TONSILS/ADENOIDS,<11 Y/O         Medications:    Current Outpatient Prescriptions on File Prior to Visit:  HYDROcodone-acetaminophen (NORCO) 5-325 MG per tablet Take 1-2 tablets by mouth every 4 hours as needed for other (Moderate to Severe  "Pain)   diclofenac (VOLTAREN) 75 MG EC tablet Take 1 tablet (75 mg) by mouth 2 times daily as needed for moderate pain   senna-docusate (SENOKOT-S;PERICOLACE) 8.6-50 MG per tablet Take 1-2 tablets by mouth 2 times daily Take while on oral narcotics to prevent or treat constipation.   meloxicam (MOBIC) 15 MG tablet Take 1 tablet (15 mg) by mouth daily   citalopram (CELEXA) 20 MG tablet Take 1 tablet (20 mg) by mouth daily     No current facility-administered medications on file prior to visit.     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      Social History Main Topics     Smoking status: Never Smoker     Smokeless tobacco: Never Used     Alcohol use No     Drug use: No     Sexual activity: Yes     Partners: Male     Birth control/ protection: Surgical      Comment: PPBTL       Family History   Problem Relation Age of Onset     Arthritis Mother      Hypertension Mother      Alcohol/Drug Maternal Grandmother      alcohol     Alcohol/Drug Maternal Grandfather      alcohol     DIABETES Maternal Grandfather      adult-onset     Depression Brother        REVIEW OF SYSTEMS  General: negative for, night sweats, dizziness, fatigue  Resp: No shortness of breath and no cough  CV: negative for chest pain, syncope or near-syncope  GI: negative for nausea, vomiting and diarrhea  : negative for dysuria and hematuria  Musculoskeletal: as above  Neurologic: negative for syncope   Hematologic: negative for bleeding disorder    Physical Exam:  Vitals: Temp 97  F (36.1  C)  Ht 1.676 m (5' 6\")  Wt 89.4 kg (197 lb)  BMI 31.8 kg/m2  BMI= Body mass index is 31.8 kg/(m^2).  Constitutional: healthy, alert and no acute distress   Psychiatric: mentation appears normal and affect normal/bright  NEURO: no focal deficits, CMS intact left lower extremity  RESP: Normal with easy respirations and no use of accessory muscles to breathe, no audible wheezing or " retractions  CV: Calf soft and nontender to palpation, leg warm   SKIN: No erythema, rashes, excoriation, or breakdown. No evidence of infection.   MUSCULOSKELETAL:    INSPECTION of left knee: No gross deformities, erythema, edema, ecchymosis, atrophy or fasciculations.     PALPATION: No tenderness on palpation of the medial, lateral, anterior and posterior portion of the knee. No specific joint line tenderness. No increased warmth.     ROM: Extension full, flexion to approximately 125 . All range of motion without catching, locking or pain.       STRENGTH: 5 out of 5 quad and hamstring strength.     SPECIAL TEST: Patient has a negative Lachman's negative drawer sign. Patient's knee is stable to varus and valgus stress at 30  of flexion. Patient has a negative Pema's.   GAIT: non-antalgic, did have antalgic gait after the injection.  Lymph: no palpable lymph nodes    Diagnostic Modalities:  not needed today.      Impression: 1. 2 months 3 days status post left knee arthroscopic partial medial meniscectomy, synovectomy, chondral debridement. 2. Left knee degenerative joint disease      Patient Instructions:   Plan:  1. Arthroscopy Photos: Not needed.    2. Incision(s): Healed  3. Anticoagulation: Not needed.    4. Pain Medication: We did a medrol dose pack, after that is done you can try the Lordine medication that I ordered. We sent this to your [place] pharmacy.  5. Weight Bearing: weight bearing as tolerated  6. Activity/Therapy: continue gentle exercises. Stay as strong, mobile and active as possible.   7. Brace/Sling: you have a large change knee brace and a knee sleeve. We decided to give you a shorter smaller hinged knee brace that might work better for you.  8. Work: you are looking at changing jobs where you would be doing a lot less walking with the new job. We are hoping this pans out for you.  9. We know that it is your arthritis is given you most of the pain, we decided to a cortisone  injection.  Follow-up:  You can followup with Brittnee Bean MD in 6 weeks, if you are having pain at that time we can do a cortisone injection.  You can always cancel the appointment if you are doing really well and follow-up as needed  Re-x-ray on return: No    Scribed by Armando Sharpe PA-C on 9/20/2017 at 4:40 PM, based on Dr. Brittnee Bean's statements to me.    This note was dictated with [employer].    MERVIN Biswas MD          Again, thank you for allowing me to participate in the care of your patient.        Sincerely,        Brittnee Bean MD

## 2017-09-20 NOTE — NURSING NOTE
"Chief Complaint   Patient presents with     Surgical Followup     post op # 2 dos 7/18/12 left knee arthroscopy with partial medial menisectomy       Initial Temp 97  F (36.1  C)  Ht 1.676 m (5' 6\")  Wt 89.4 kg (197 lb)  BMI 31.8 kg/m2 Estimated body mass index is 31.8 kg/(m^2) as calculated from the following:    Height as of this encounter: 1.676 m (5' 6\").    Weight as of this encounter: 89.4 kg (197 lb).  Medication Reconciliation: complete    BP completed using cuff size: NA (Not Taken)    Suzy Devi MA      "

## 2017-09-26 DIAGNOSIS — M17.12 PRIMARY OSTEOARTHRITIS OF LEFT KNEE: ICD-10-CM

## 2017-09-26 NOTE — TELEPHONE ENCOUNTER
Medrol dose efra      Last Written Prescription Date: 9/20/17  Last Fill Quantity: 21,  # refills: 0   Last Office Visit with [facility], [facility] or [facility] prescribing provider: 9/20/17

## 2017-09-27 RX ORDER — METHYLPREDNISOLONE 4 MG
TABLET, DOSE PACK ORAL
Qty: 21 TABLET | Refills: 0 | OUTPATIENT
Start: 2017-09-27

## 2017-09-27 NOTE — TELEPHONE ENCOUNTER
This is not a med we do refills of, we would need to see her to decide if she needed more steroid.  Armando Sharpe PA-C

## 2017-09-27 NOTE — TELEPHONE ENCOUNTER
Discussed this with Dr. Bean also, we wait 6 mo between medrol dose packs.  I left her a voicemail this am about this. Armando Sharpe PA-C

## 2017-10-02 NOTE — TELEPHONE ENCOUNTER
Patient called to ask about this and I told her the information below. She was fine with this and said that she will take her other medication instead.

## 2017-12-19 DIAGNOSIS — M17.12 PRIMARY OSTEOARTHRITIS OF LEFT KNEE: ICD-10-CM

## 2017-12-19 NOTE — TELEPHONE ENCOUNTER
etodolac (LODINE) 400 MG tablet      Last Written Prescription Date: 9/20/17  Last Quantity: 60, # refills: 1  Last Office Visit with [facility], [facility] or [facility] prescribing provider: 7/5/17       Creatinine   Date Value Ref Range Status   07/05/2017 0.63 0.52 - 1.04 mg/dL Final     Lab Results   Component Value Date    AST 14 07/14/2015     Lab Results   Component Value Date    ALT 28 07/14/2015     BP Readings from Last 3 Encounters:   07/18/17 (!) 158/97   07/05/17 128/84   04/28/17 130/86

## 2017-12-21 RX ORDER — ETODOLAC 400 MG
400 TABLET ORAL 2 TIMES DAILY
Qty: 60 TABLET | Refills: 1 | Status: SHIPPED | OUTPATIENT
Start: 2017-12-21 | End: 2018-07-18

## 2017-12-21 NOTE — TELEPHONE ENCOUNTER
Requested Prescriptions   Pending Prescriptions Disp Refills     etodolac (LODINE) 400 MG tablet 60 tablet 1     Sig: Take 1 tablet (400 mg) by mouth 2 times daily    NSAID Medications Failed    12/19/2017  3:49 PM       Failed - Blood pressure under 140/90    BP Readings from Last 3 Encounters:   07/18/17 (!) 158/97   07/05/17 128/84   04/28/17 130/86                Failed - Normal ALT on file in past 12 months    Recent Labs   Lab Test  07/14/15   0947   ALT  28            Failed - Normal AST on file in past 12 months    Recent Labs   Lab Test  07/14/15   0947   AST  14            Passed - Recent or future visit with authorizing provider's specialty    Patient had office visit in the last year or has a visit in the next 30 days with authorizing provider.  See chart review.              Passed - Patient is age 6-64 years       Passed - Normal CBC on file in past 12 months    Recent Labs   Lab Test  07/05/17   0810   WBC  5.7   RBC  4.64   HGB  13.6   HCT  42.2   PLT  324            Passed - No active pregnancy on record       Passed - Normal serum creatinine on file in past 12 months    Recent Labs   Lab Test  07/05/17   0810   CR  0.63            Passed - No positive pregnancy test in past 12 months

## 2017-12-21 NOTE — TELEPHONE ENCOUNTER
Routing refill request to provider for review/approval because:  Labs out of range:  BP  Labs not current:  AST/ALT    Tarah Rodriguez RN  [facility]

## 2018-05-10 ENCOUNTER — TELEPHONE (OUTPATIENT)
Dept: FAMILY MEDICINE | Facility: OTHER | Age: 53
End: 2018-05-10

## 2018-05-10 DIAGNOSIS — F33.0 MILD RECURRENT MAJOR DEPRESSION (H): Primary | ICD-10-CM

## 2018-05-10 NOTE — LETTER
[facility]  [address] [facility] [id number] [phone]        Brittnee SHI Germaine  [address] [facility] [address]      May 29, 2018      Dear Brittnee,    I care about your health and have reviewed your health plan, including your medical conditions, medication list, and lab results and am making recommendations based on this review, to better manage your health.    You are in particular need of attention regarding:  -Depression  -Breast Cancer Screening  -Colon Cancer Screening    I am recommending that you:  -schedule a MAMMOGRAM which is due. You can call  [phone] to schedule your mammogram.    -schedule a COLONOSCOPY.  Colon cancer is now the second leading cause of cancer-related deaths in the [place] for both men and women.  There are over 130,000 new cases and 50,000 deaths per year from colon cancer.  A recent study, which included patients ages 55 to 79 found 50,400 [place] deaths from colorectal cancer could have been prevented if patients had undergone a colonoscopy in the previous 10 years.    If you have not had a colonoscopy, we encourage you to schedule by contacting us at [phone], Monday through Friday.  After hours, you may leave a message and we will return your call during normal business hours.      There is another option called a FIT test, if you don t wish to have a colonoscopy, which needs to be repeated every year.  It does replace the colonoscopy for colorectal cancer screening and can detect hidden bleeding in the lower colon.  If a positive result is obtained, you would be referred for a colonoscopy. Please discuss this option with your provider.      For patients under/uninsured, we recommend you contact the [employer] program. [employer] is a free colorectal cancer screening program that provides colonoscopies for eligible under/uninsured [place] men and women. If you are interested in receiving a free colonoscopy, please  call [facility] at [phone] (mention code [employer]) to see if you re eligible.   - please call the clinic to complete the depression questions.      If you've had the preventative screening completed at another facility or feel you're not due for this screening, please call our clinic at the number listed above or send us a My Chart message so we can update our records. We would like to thank you in advance for taking the time to take care of your health.  If you have any questions, please don t hesitate to contact our clinic.    Sincerely,       Your [facility] Team

## 2018-05-10 NOTE — LETTER
My Depression Action Plan  Name: Brittnee Herron   Date of Birth 1965  Date: 5/29/2018    My doctor: Lissette Grant   My clinic: [place] [address] [facility] [address] [phone]          GREEN    ZONE   Good Control    What it looks like:     Things are going generally well. You have normal up s and down s. You may even feel depressed from time to time, but bad moods usually last less than a day.   What you need to do:  1. Continue to care for yourself (see self care plan)  2. Check your depression survival kit and update it as needed  3. Follow your physician s recommendations including any medication.  4. Do not stop taking medication unless you consult with your physician first.           YELLOW         ZONE Getting Worse    What it looks like:     Depression is starting to interfere with your life.     It may be hard to get out of bed; you may be starting to isolate yourself from others.    Symptoms of depression are starting to last most all day and this has happened for several days.     You may have suicidal thoughts but they are not constant.   What you need to do:     1. Call your care team, your response to treatment will improve if you keep your care team informed of your progress. Yellow periods are signs an adjustment may need to be made.     2. Continue your self-care, even if you have to fake it!    3. Talk to someone in your support network    4. Open up your depression survival kit           RED    ZONE Medical Alert - Get Help    What it looks like:     Depression is seriously interfering with your life.     You may experience these or other symptoms: You can t get out of bed most days, can t work or engage in other necessary activities, you have trouble taking care of basic hygiene, or basic responsibilities, thoughts of suicide or death that will not go away, self-injurious behavior.     What you need to do:  1. Call your care team and request  a same-day appointment. If they are not available (weekends or after hours) call your local crisis line, emergency room or 911.            Depression Self Care Plan / Survival Kit    Self-Care for Depression  Here s the deal. Your body and mind are really not as separate as most people think.  What you do and think affects how you feel and how you feel influences what you do and think. This means if you do things that people who feel good do, it will help you feel better.  Sometimes this is all it takes.  There is also a place for medication and therapy depending on how severe your depression is, so be sure to consult with your medical provider and/ or Behavioral Health Consultant if your symptoms are worsening or not improving.     In order to better manage my stress, I will:    Exercise  Get some form of exercise, every day. This will help reduce pain and release endorphins, the  feel good  chemicals in your brain. This is almost as good as taking antidepressants!  This is not the same as joining a gym and then never going! (they count on that by the way ) It can be as simple as just going for a walk or doing some gardening, anything that will get you moving.      Hygiene   Maintain good hygiene (Get out of bed in the morning, Make your bed, Brush your teeth, Take a shower, and Get dressed like you were going to work, even if you are unemployed).  If your clothes don't fit try to get ones that do.    Diet  I will strive to eat foods that are good for me, drink plenty of water, and avoid excessive sugar, caffeine, alcohol, and other mood-altering substances.  Some foods that are helpful in depression are: complex carbohydrates, B vitamins, flaxseed, fish or fish oil, fresh fruits and vegetables.    Psychotherapy  I agree to participate in Individual Therapy (if recommended).    Medication  If prescribed medications, I agree to take them.  Missing doses can result in serious side effects.  I understand that drinking  alcohol, or other illicit drug use, may cause potential side effects.  I will not stop my medication abruptly without first discussing it with my provider.    Staying Connected With Others  I will stay in touch with my friends, family members, and my primary care provider/team.    Use your imagination  Be creative.  We all have a creative side; it doesn t matter if it s oil painting, sand castles, or mud pies! This will also kick up the endorphins.    Witness Beauty  (AKA stop and smell the roses) Take a look outside, even in mid-winter. Notice colors, textures. Watch the squirrels and birds.     Service to others  Be of service to others.  There is always someone else in need.  By helping others we can  get out of ourselves  and remember the really important things.  This also provides opportunities for practicing all the other parts of the program.    Humor  Laugh and be silly!  Adjust your TV habits for less news and crime-drama and more comedy.    Control your stress  Try breathing deep, massage therapy, biofeedback, and meditation. Find time to relax each day.     My support system    Clinic Contact:  Phone number:    Contact 1:  Phone number:    Contact 2:  Phone number:    Hinduism/:  Phone number:    Therapist:  Phone number:    Local crisis center:    Phone number:    Other community support:  Phone number:

## 2018-05-10 NOTE — TELEPHONE ENCOUNTER
Panel Management Review      Patient has the following on her problem list:      Composite cancer screening  Chart review shows that this patient is due/due soon for the following   Summary:    Patient is due/failing the following:   Hepatitis C screen , COLONOSCOPY, DAP, MAMMOGRAM and PHQ9    Action needed:   Patient needs office visit for establish care.    Type of outreach:    Phone, spoke to patient.  she was busy and asked for a call back.    Questions for provider review:    None                                                                                                                                    Mary Ann TERRAZAS LPN       Chart routed to Mary Ann TERRAZAS LPN   .

## 2018-05-17 ENCOUNTER — TELEPHONE (OUTPATIENT)
Dept: FAMILY MEDICINE | Facility: OTHER | Age: 53
End: 2018-05-17

## 2018-06-19 ENCOUNTER — TELEPHONE (OUTPATIENT)
Dept: ORTHOPEDICS | Facility: CLINIC | Age: 53
End: 2018-06-19

## 2018-06-19 NOTE — TELEPHONE ENCOUNTER
I called to let the patient know.  The number below did not work so I used her mobile. She understands and I gave her the number to clinic.  She will make an apt.

## 2018-06-19 NOTE — TELEPHONE ENCOUNTER
Reason for Call:  Other prescription    Detailed comments: Patient called stating that her pain medication that she is using is not working and she is having pain. She was wondering if there was something else to switch her to.     Phone Number Patient can be reached at: Home number on file [phone] (home)    Best Time: ANY    Can we leave a detailed message on this number? YES    Call taken on 6/19/2018 at 10:25 AM by Sylvia Blount

## 2018-07-18 ENCOUNTER — OFFICE VISIT (OUTPATIENT)
Dept: FAMILY MEDICINE | Facility: OTHER | Age: 53
End: 2018-07-18
Payer: COMMERCIAL

## 2018-07-18 VITALS
HEART RATE: 94 BPM | BODY MASS INDEX: 31.72 KG/M2 | WEIGHT: 197.4 LBS | OXYGEN SATURATION: 98 % | SYSTOLIC BLOOD PRESSURE: 156 MMHG | RESPIRATION RATE: 14 BRPM | TEMPERATURE: 98.7 F | DIASTOLIC BLOOD PRESSURE: 98 MMHG | HEIGHT: 66 IN

## 2018-07-18 DIAGNOSIS — Z12.4 SCREENING FOR MALIGNANT NEOPLASM OF CERVIX: ICD-10-CM

## 2018-07-18 DIAGNOSIS — Z12.31 VISIT FOR SCREENING MAMMOGRAM: ICD-10-CM

## 2018-07-18 DIAGNOSIS — M17.12 PRIMARY OSTEOARTHRITIS OF LEFT KNEE: ICD-10-CM

## 2018-07-18 DIAGNOSIS — N63.20 LEFT BREAST LUMP: ICD-10-CM

## 2018-07-18 DIAGNOSIS — F41.1 GAD (GENERALIZED ANXIETY DISORDER): ICD-10-CM

## 2018-07-18 DIAGNOSIS — Z12.11 SCREEN FOR COLON CANCER: ICD-10-CM

## 2018-07-18 DIAGNOSIS — Z00.00 ROUTINE GENERAL MEDICAL EXAMINATION AT A HEALTH CARE FACILITY: Primary | ICD-10-CM

## 2018-07-18 DIAGNOSIS — R03.0 ELEVATED BLOOD PRESSURE READING WITHOUT DIAGNOSIS OF HYPERTENSION: ICD-10-CM

## 2018-07-18 DIAGNOSIS — F33.0 MILD RECURRENT MAJOR DEPRESSION (H): ICD-10-CM

## 2018-07-18 PROCEDURE — G0145 SCR C/V CYTO,THINLAYER,RESCR: HCPCS | Performed by: NURSE PRACTITIONER

## 2018-07-18 PROCEDURE — 99396 PREV VISIT EST AGE 40-64: CPT | Performed by: NURSE PRACTITIONER

## 2018-07-18 PROCEDURE — 99214 OFFICE O/P EST MOD 30 MIN: CPT | Mod: 25 | Performed by: NURSE PRACTITIONER

## 2018-07-18 PROCEDURE — 87624 HPV HI-RISK TYP POOLED RSLT: CPT | Performed by: NURSE PRACTITIONER

## 2018-07-18 RX ORDER — VENLAFAXINE HYDROCHLORIDE 37.5 MG/1
TABLET, EXTENDED RELEASE ORAL
Qty: 46 TABLET | Refills: 0 | Status: SHIPPED | OUTPATIENT
Start: 2018-07-18 | End: 2018-10-03

## 2018-07-18 RX ORDER — METHYLPREDNISOLONE 4 MG
TABLET, DOSE PACK ORAL
Qty: 21 TABLET | Refills: 0 | Status: SHIPPED | OUTPATIENT
Start: 2018-07-18 | End: 2019-04-01

## 2018-07-18 RX ORDER — ETODOLAC 400 MG
400 TABLET ORAL 2 TIMES DAILY
Qty: 60 TABLET | Refills: 1 | Status: SHIPPED | OUTPATIENT
Start: 2018-07-18 | End: 2019-04-01

## 2018-07-18 ASSESSMENT — ANXIETY QUESTIONNAIRES
6. BECOMING EASILY ANNOYED OR IRRITABLE: SEVERAL DAYS
5. BEING SO RESTLESS THAT IT IS HARD TO SIT STILL: NOT AT ALL
7. FEELING AFRAID AS IF SOMETHING AWFUL MIGHT HAPPEN: NOT AT ALL
GAD7 TOTAL SCORE: 10
IF YOU CHECKED OFF ANY PROBLEMS ON THIS QUESTIONNAIRE, HOW DIFFICULT HAVE THESE PROBLEMS MADE IT FOR YOU TO DO YOUR WORK, TAKE CARE OF THINGS AT HOME, OR GET ALONG WITH OTHER PEOPLE: SOMEWHAT DIFFICULT
1. FEELING NERVOUS, ANXIOUS, OR ON EDGE: NEARLY EVERY DAY
2. NOT BEING ABLE TO STOP OR CONTROL WORRYING: SEVERAL DAYS
3. WORRYING TOO MUCH ABOUT DIFFERENT THINGS: NEARLY EVERY DAY

## 2018-07-18 ASSESSMENT — PATIENT HEALTH QUESTIONNAIRE - PHQ9: 5. POOR APPETITE OR OVEREATING: MORE THAN HALF THE DAYS

## 2018-07-18 ASSESSMENT — PAIN SCALES - GENERAL: PAINLEVEL: NO PAIN (0)

## 2018-07-18 NOTE — PROGRESS NOTES
SUBJECTIVE:   CC: Brittnee Herron is an 52 year old woman who presents for preventive health visit.     Physical   Annual:     Getting at least 3 servings of Calcium per day:  Yes    Bi-annual eye exam:  Yes    Dental care twice a year:  Yes    Sleep apnea or symptoms of sleep apnea:  Daytime drowsiness    Diet:  Regular (no restrictions)    Frequency of exercise:  None    Taking medications regularly:  Yes    Medication side effects:  Not applicable    Additional concerns today:  YES    She has several additional concerns to discuss today:     Depression and Anxiety Follow-Up    Status since last visit: Worsened     Other associated symptoms:None    Complicating factors:     Significant life event: Yes-  Stress at work     Current substance abuse: None    PHQ-9 12/28/2015 9/19/2016 7/18/2018   Total Score 4 4 9   Q9: Suicide Ideation Not at all Not at all Not at all     LIAT-7 SCORE 9/19/2016 7/18/2018   Total Score 2 10     She is currently on Celexa, but states this is no longer working.  Has also been on Zoloft in the past without success.  Is not currently in counseling. Having increased stress at work.     Left breast lump  Noticed just this week.  It is not painful.  Last mammogram was in 2008 and was normal.  Her mother was just diagnosed with breast cancer in her 70s.        Also has chronic left knee pain from OA.  Has had injections in the past.  This has flared up over the past few weeks, she would like a refill of her Etodolac and also another Medrol dose pack, as that has been helpful in the past.      Today's PHQ-2 Score:   PHQ-2 ( 1999 [employer]) 7/18/2018   Q1: Little interest or pleasure in doing things 0   Q2: Feeling down, depressed or hopeless 1   PHQ-2 Score 1   Q1: Little interest or pleasure in doing things Not at all   Q2: Feeling down, depressed or hopeless Several days   PHQ-2 Score 1       Abuse: Current or Past(Physical, Sexual or Emotional)- No  Do you feel safe in your environment -  Yes    Social History   Substance Use Topics     Smoking status: Never Smoker     Smokeless tobacco: Never Used     Alcohol use No     Alcohol Use 7/18/2018   If you drink alcohol do you typically have greater than 3 drinks per day OR greater than 7 drinks per week? No   No flowsheet data found.    Reviewed orders with patient.  Reviewed health maintenance and updated orders accordingly - Yes  Labs reviewed in EPIC  BP Readings from Last 3 Encounters:   07/18/18 (!) 156/98   07/18/17 (!) 158/97   07/05/17 128/84    Wt Readings from Last 3 Encounters:   07/18/18 197 lb 6.4 oz (89.5 kg)   09/20/17 197 lb (89.4 kg)   07/31/17 194 lb (88 kg)                  Patient Active Problem List   Diagnosis     Rhinitis, allergic seasonal     Mild recurrent major depression (H)     Primary osteoarthritis of left knee     LIAT (generalized anxiety disorder)     Elevated blood pressure reading without diagnosis of hypertension     Past Surgical History:   Procedure Laterality Date     ARTHROSCOPY KNEE WITH MEDIAL MENISCECTOMY Left 7/18/2017    Procedure: ARTHROSCOPY KNEE WITH MEDIAL MENISCECTOMY;  left knee arthroscopy with partial medial menisectomy;  Surgeon: Brittnee Bean MD;  Location: [facility] OR     C LIGATE FALLOPIAN TUBE,POSTPARTUM  08/03/2002    Postpartum bilateral tubal ligation with partial salpingectomy through a mini laparotomy     HC REMOVAL OF OVARIAN CYST(S)  1990    laparoscopic     HC REMOVE TONSILS/ADENOIDS,<11 Y/O         Social History   Substance Use Topics     Smoking status: Never Smoker     Smokeless tobacco: Never Used     Alcohol use No     Family History   Problem Relation Age of Onset     Arthritis Mother      Hypertension Mother      Alcohol/Drug Maternal Grandmother      alcohol     Alcohol/Drug Maternal Grandfather      alcohol     Diabetes Maternal Grandfather      adult-onset     Depression Brother          Current Outpatient Prescriptions   Medication Sig Dispense Refill     etodolac (LODINE) 400  MG tablet Take 1 tablet (400 mg) by mouth 2 times daily 60 tablet 1     methylPREDNISolone (MEDROL DOSEPAK) 4 MG tablet Follow package instructions 21 tablet 0     venlafaxine (EFFEXOR-ER) 37.5 MG TB24 24 hr tablet Take 1 tablet daily for 14 days, then  Take 2 tablets daily 46 tablet 0     [DISCONTINUED] etodolac (LODINE) 400 MG tablet Take 1 tablet (400 mg) by mouth 2 times daily 60 tablet 1       Patient over age 50, mutual decision to screen reflected in health maintenance.    Pertinent mammograms are reviewed under the imaging tab.  History of abnormal Pap smear:   Last 3 Pap Results:   PAP (no units)   Date Value   07/14/2015 NIL   09/28/2005 NIL     PAP / HPV Latest Ref Rng & Units 7/14/2015 9/28/2005   PAP - NIL NIL   HPV 16 DNA NEG Negative -   HPV 18 DNA NEG Negative -   OTHER HR HPV NEG Negative -     Reviewed and updated as needed this visit by clinical staff  Tobacco  Allergies  Meds  Med Hx  Surg Hx  Fam Hx  Soc Hx        Reviewed and updated as needed this visit by Provider        Past Medical History:   Diagnosis Date     Depressive disorder, not elsewhere classified 1982    off meds for a year      Past Surgical History:   Procedure Laterality Date     ARTHROSCOPY KNEE WITH MEDIAL MENISCECTOMY Left 7/18/2017    Procedure: ARTHROSCOPY KNEE WITH MEDIAL MENISCECTOMY;  left knee arthroscopy with partial medial menisectomy;  Surgeon: Brittnee Bean MD;  Location: [facility]     C LIGATE FALLOPIAN TUBE,POSTPARTUM  08/03/2002    Postpartum bilateral tubal ligation with partial salpingectomy through a mini laparotomy     HC REMOVAL OF OVARIAN CYST(S)  1990    laparoscopic     HC REMOVE TONSILS/ADENOIDS,<11 Y/O         Review of Systems  CONSTITUTIONAL: NEGATIVE for fever, chills, change in weight  INTEGUMENTARY/SKIN: NEGATIVE for worrisome rashes, moles or lesions  EYES: NEGATIVE for vision changes or irritation  ENT: NEGATIVE for ear, mouth and throat problems  RESP: NEGATIVE for significant cough or  "SOB  BREAST: see HPI  CV: NEGATIVE for chest pain, palpitations or peripheral edema  GI: NEGATIVE for nausea, abdominal pain, heartburn, or change in bowel habits  : NEGATIVE for unusual urinary or vaginal symptoms. No vaginal bleeding.  MUSCULOSKELETAL: NEGATIVE for significant arthralgias or myalgia  NEURO: NEGATIVE for weakness, dizziness or paresthesias  PSYCHIATRIC: NEGATIVE for changes in mood or affect      OBJECTIVE:   BP (!) 156/98 (BP Location: Left arm, Patient Position: Chair, Cuff Size: Adult Large)  Pulse 94  Temp 98.7  F (37.1  C) (Tympanic)  Resp 14  Ht 5' 5.5\" (1.664 m)  Wt 197 lb 6.4 oz (89.5 kg)  LMP 07/10/2018  SpO2 98%  BMI 32.35 kg/m2  Physical Exam  GENERAL APPEARANCE: healthy, alert, no distress and obese  EYES: Eyes grossly normal to inspection, PERRL and conjunctivae and sclerae normal  HENT: ear canals and TM's normal, nose and mouth without ulcers or lesions, oropharynx clear and oral mucous membranes moist  NECK: no adenopathy, no asymmetry, masses, or scars and thyroid normal to palpation  RESP: lungs clear to auscultation - no rales, rhonchi or wheezes  BREAST: Right breast: normal, no masses or skin changes  Left breast: palpable lump 10:00 position.  No tenderness to palpation, no skin changes.   CV: regular rate and rhythm, normal S1 S2, no S3 or S4, no murmur, click or rub, no peripheral edema and peripheral pulses strong  ABDOMEN: soft, nontender, no hepatosplenomegaly, no masses and bowel sounds normal   (female): normal female external genitalia, normal urethral meatus, vaginal mucosal atrophy noted, normal cervix, adnexae, and uterus without masses or abnormal discharge  MS: no musculoskeletal defects are noted and gait is age appropriate without ataxia  SKIN: no suspicious lesions or rashes  NEURO: Normal strength and tone, sensory exam grossly normal, mentation intact and speech normal  PSYCH: mentation appears normal, well groomed and anxious    Diagnostic Test " Results:  Pending     ASSESSMENT/PLAN:   1. Routine general medical examination at a health care facility    2. Mild recurrent major depression (H)  Discussed treatment options including pharmacologic and nonpharmacologic options.  Patient declines counseling.  Start Effexor as prescribed.  Risks/benefits discussed, including risk of suicidal ideations.  Patient denies any suicidal ideations today.  Discussed that medication will take 4-6 weeks to start working.  Follow up for recheck in 1 month.      - venlafaxine (EFFEXOR-ER) 37.5 MG TB24 24 hr tablet; Take 1 tablet daily for 14 days, then  Take 2 tablets daily  Dispense: 46 tablet; Refill: 0    3. LIAT (generalized anxiety disorder)  Discussed treatment options including pharmacologic and nonpharmacologic options.  Patient declines counseling.  Start Effexor as prescribed.  Risks/benefits discussed, including risk of suicidal ideations.  Patient denies any suicidal ideations today.  Discussed that medication will take 4-6 weeks to start working.  Follow up for recheck in 1 month.   - venlafaxine (EFFEXOR-ER) 37.5 MG TB24 24 hr tablet; Take 1 tablet daily for 14 days, then  Take 2 tablets daily  Dispense: 46 tablet; Refill: 0    4. Left breast lump  Will refer for diagnostic mammogram and ultrasound   - MA Diagnostic Digital Bilateral; Future  - US Breast Left Complete 4 Quadrants; Future    5. Elevated blood pressure reading without diagnosis of hypertension  She reports being nervous today.  Will recheck tomorrow with a nurse only visit, she is coming back for a vaccine update at that time.      6. Primary osteoarthritis of left knee  Chronic, controlled.  No change in treatment plan.   Medrol dose pack for PRN use.  May need to see ortho again in the future.   - etodolac (LODINE) 400 MG tablet; Take 1 tablet (400 mg) by mouth 2 times daily  Dispense: 60 tablet; Refill: 1  - methylPREDNISolone (MEDROL DOSEPAK) 4 MG tablet; Follow package instructions  Dispense:  "21 tablet; Refill: 0    7. Screen for colon cancer  - GASTROENTEROLOGY ADULT REF PROCEDURE ONLY [facility] [phone]; [place] Provider    8. Visit for screening mammogram  - MA Diagnostic Digital Bilateral; Future    9. Screening for malignant neoplasm of cervix  - Pap imaged thin layer screen with HPV - recommended age 30 - 65 years (select HPV order below)  - HPV High Risk Types DNA Cervical        COUNSELING:  Reviewed preventive health counseling, as reflected in patient instructions       Regular exercise       Healthy diet/nutrition       Colon cancer screening    BP Readings from Last 1 Encounters:   07/18/18 (!) 156/98     Estimated body mass index is 32.35 kg/(m^2) as calculated from the following:    Height as of this encounter: 5' 5.5\" (1.664 m).    Weight as of this encounter: 197 lb 6.4 oz (89.5 kg).    BP Screening:   Last 3 BP Readings:    BP Readings from Last 3 Encounters:   07/18/18 (!) 156/98   07/18/17 (!) 158/97   07/05/17 128/84       The following was recommended to the patient:  Re-screen within 4 weeks and recommend lifestyle modifications  Weight management plan: Discussed healthy diet and exercise guidelines and patient will follow up in 12 months  in clinic to re-evaluate.     reports that she has never smoked. She has never used smokeless tobacco.      Counseling Resources:  ATP IV Guidelines  Pooled Cohorts Equation Calculator  Breast Cancer Risk Calculator  FRAX Risk Assessment  ICSI Preventive Guidelines  Dietary Guidelines for Americans, 2010  USDA's MyPlate  ASA Prophylaxis  Lung CA Screening    CLEMENTE Talyor CNP  [facility]  "

## 2018-07-18 NOTE — LETTER
July 26, 2018    Brittnee YURIDIA Germaine  [address] [facility] [address]    Dear Brittnee,  We are happy to inform you that your PAP smear result from 07/18/18 is normal.  We are now able to do a follow up test on PAP smears. The DNA test is for HPV (Human Papilloma Virus). Cervical cancer is closely linked with certain types of HPV. Your results showed no evidence of high risk HPV.  Therefore we recommend you return in 5 years for your next pap smear and HPV test.  You will still need to return to the clinic every year for an annual exam and other preventive tests.  Please contact the clinic at [phone] with any questions.  Sincerely,    CLEMENTE Taylor CNP/[facility]

## 2018-07-18 NOTE — MR AVS SNAPSHOT
After Visit Summary   7/18/2018    Brittnee Herron    MRN: [id number]           Patient Information     Date Of Birth          1965        Visit Information        Provider Department      7/18/2018 3:00 PM Lissette Grant APRN [facility]        Today's Diagnoses     Routine general medical examination at a health care facility    -  1    Screen for colon cancer        Visit for screening mammogram        Screening for malignant neoplasm of cervix        Need for hepatitis C screening test        Need for prophylactic vaccination with tetanus-diphtheria (TD)        Primary osteoarthritis of left knee        LIAT (generalized anxiety disorder)        Mild recurrent major depression (H)        Left breast lump          Care Instructions    Stop the Citalopram.      Start the Effexor as prescribed.     Follow up in 1 month for recheck     Use the Medrol dose pack as needed for your knee    The results of the pap test take about 2 weeks to come back.  We will send a letter with these results and the recommendations for further pap tests in the future.     Come back tomorrow for a nurse visit for a blood pressure recheck and your tetanus vaccine.       Have a mammogram and ultrasound done in [place].     Have your colonoscopy done in [place].           Preventive Health Recommendations  Female Ages 50 - 64    Yearly exam: See your health care provider every year in order to  o Review health changes.   o Discuss preventive care.    o Review your medicines if your doctor has prescribed any.      Get a Pap test every three years (unless you have an abnormal result and your provider advises testing more often).    If you get Pap tests with HPV test, you only need to test every 5 years, unless you have an abnormal result.     You do not need a Pap test if your uterus was removed (hysterectomy) and you have not had cancer.    You should be tested each year for STDs (sexually  transmitted diseases) if you're at risk.     Have a mammogram every 1 to 2 years.    Have a colonoscopy at age 50, or have a yearly FIT test (stool test). These exams screen for colon cancer.      Have a cholesterol test every 5 years, or more often if advised.    Have a diabetes test (fasting glucose) every three years. If you are at risk for diabetes, you should have this test more often.     If you are at risk for osteoporosis (brittle bone disease), think about having a bone density scan (DEXA).    Shots: Get a flu shot each year. Get a tetanus shot every 10 years.    Nutrition:     Eat at least 5 servings of fruits and vegetables each day.    Eat whole-grain bread, whole-wheat pasta and brown rice instead of white grains and rice.    Get adequate Calcium and Vitamin D.     Lifestyle    Exercise at least 150 minutes a week (30 minutes a day, 5 days a week). This will help you control your weight and prevent disease.    Limit alcohol to one drink per day.    No smoking.     Wear sunscreen to prevent skin cancer.     See your dentist every six months for an exam and cleaning.    See your eye doctor every 1 to 2 years.            Follow-ups after your visit        Additional Services     GASTROENTEROLOGY ADULT REF PROCEDURE ONLY [facility] [phone]; [place] Provider       Last Lab Result: Creatinine (mg/dL)       Date                     Value                 07/05/2017               0.63             ----------  There is no height or weight on file to calculate BMI.     Needed:  No  Language:  English    Patient will be contacted to schedule procedure.     Please be aware that coverage of these services is subject to the terms and limitations of your health insurance plan.  Call member services at your health plan with any benefit or coverage questions.  Any procedures must be performed at a [place] facility OR coordinated by your clinic's referral office.    Please bring the following  "with you to your appointment:    (1) Any X-Rays, CTs or MRIs which have been performed.  Contact the facility where they were done to arrange for  prior to your scheduled appointment.    (2) List of current medications   (3) This referral request   (4) Any documents/labs given to you for this referral                  Future tests that were ordered for you today     Open Future Orders        Priority Expected Expires Ordered    MA Diagnostic Digital Bilateral Routine  7/18/2019 7/18/2018    US Breast Left Complete 4 Quadrants Routine  7/18/2019 7/18/2018            Who to contact     If you have questions or need follow up information about today's clinic visit or your schedule please contact [facility] directly at [phone].  Normal or non-critical lab and imaging results will be communicated to you by MyChart, letter or phone within 4 business days after the clinic has received the results. If you do not hear from us within 7 days, please contact the clinic through MyChart or phone. If you have a critical or abnormal lab result, we will notify you by phone as soon as possible.  Submit refill requests through [employer] or call your pharmacy and they will forward the refill request to us. Please allow 3 business days for your refill to be completed.          Additional Information About Your Visit        Care EveryWhere ID     This is your Care EveryWhere ID. This could be used by other organizations to access your [place] medical records  [id number]        Your Vitals Were     Pulse Temperature Respirations Height Last Period Pulse Oximetry    94 98.7  F (37.1  C) (Tympanic) 14 5' 5.5\" (1.664 m) 07/10/2018 98%    BMI (Body Mass Index)                   32.35 kg/m2            Blood Pressure from Last 3 Encounters:   07/18/18 (!) 156/98   07/18/17 (!) 158/97   07/05/17 128/84    Weight from Last 3 Encounters:   07/18/18 197 lb 6.4 oz (89.5 kg)   09/20/17 197 lb (89.4 kg)   07/31/17 194 lb (88 " kg)              We Performed the Following     GASTROENTEROLOGY ADULT REF PROCEDURE ONLY [facility] [phone]; [place] Provider     HPV High Risk Types DNA Cervical     Pap imaged thin layer screen with HPV - recommended age 30 - 65 years (select HPV order below)          Today's Medication Changes          These changes are accurate as of 7/18/18  4:01 PM.  If you have any questions, ask your nurse or doctor.               Start taking these medicines.        Dose/Directions    venlafaxine 37.5 MG Tb24 24 hr tablet   Commonly known as:  EFFEXOR-ER   Used for:  LIAT (generalized anxiety disorder), Mild recurrent major depression (H)   Started by:  Lissette Grant APRN CNP        Take 1 tablet daily for 14 days, then  Take 2 tablets daily   Quantity:  46 tablet   Refills:  0         Stop taking these medicines if you haven't already. Please contact your care team if you have questions.     citalopram 20 MG tablet   Commonly known as:  celeXA   Stopped by:  Lissette Grant APRN CNP                Where to get your medicines      These medications were sent to [facility] [address]   [address] [place] [id number]     Phone:  [phone]     etodolac 400 MG tablet    methylPREDNISolone 4 MG tablet         Call your pharmacy to confirm that your medication is ready for pickup. It may take up to 24 hours for them to receive the prescription. If the prescription is not ready within 3 business days, please contact your clinic or your provider.     We will let you know when these medications are ready. If you don't hear back within 3 business days, please contact us.     venlafaxine 37.5 MG Tb24 24 hr tablet                Primary Care Provider Office Phone # Fax #    CLEMENTE Taylor [phone]       [address] [facility] [address]        Equal Access to Services     SHADY HELM : kya Arambula, chang chowdhury  angel jamesángeljose maria la'aan ah. Brigette [facility] [phone].    ATENCIÓN: Si floresita mariee, tiene a shine disposición servicios gratuitos de asistencia lingüística. Live al [phone].    We comply with applicable federal civil rights laws and [place] laws. We do not discriminate on the basis of race, color, national origin, age, disability, sex, sexual orientation, or gender identity.            Thank you!     Thank you for choosing [facility]  for your care. Our goal is always to provide you with excellent care. Hearing back from our patients is one way we can continue to improve our services. Please take a few minutes to complete the written survey that you may receive in the mail after your visit with us. Thank you!             Your Updated Medication List - Protect others around you: Learn how to safely use, store and throw away your medicines at www.disposemymeds.org.          This list is accurate as of 7/18/18  4:01 PM.  Always use your most recent med list.                   Brand Name Dispense Instructions for use Diagnosis    etodolac 400 MG tablet    LODINE    60 tablet    Take 1 tablet (400 mg) by mouth 2 times daily    Primary osteoarthritis of left knee       methylPREDNISolone 4 MG tablet    MEDROL DOSEPAK    21 tablet    Follow package instructions    Primary osteoarthritis of left knee       venlafaxine 37.5 MG Tb24 24 hr tablet    EFFEXOR-ER    46 tablet    Take 1 tablet daily for 14 days, then  Take 2 tablets daily    LIAT (generalized anxiety disorder), Mild recurrent major depression (H)

## 2018-07-18 NOTE — PATIENT INSTRUCTIONS
Stop the Citalopram.      Start the Effexor as prescribed.     Follow up in 1 month for recheck     Use the Medrol dose pack as needed for your knee    The results of the pap test take about 2 weeks to come back.  We will send a letter with these results and the recommendations for further pap tests in the future.     Come back tomorrow for a nurse visit for a blood pressure recheck and your tetanus vaccine.       Have a mammogram and ultrasound done in [place].     Have your colonoscopy done in [place].           Preventive Health Recommendations  Female Ages 50 - 64    Yearly exam: See your health care provider every year in order to  o Review health changes.   o Discuss preventive care.    o Review your medicines if your doctor has prescribed any.      Get a Pap test every three years (unless you have an abnormal result and your provider advises testing more often).    If you get Pap tests with HPV test, you only need to test every 5 years, unless you have an abnormal result.     You do not need a Pap test if your uterus was removed (hysterectomy) and you have not had cancer.    You should be tested each year for STDs (sexually transmitted diseases) if you're at risk.     Have a mammogram every 1 to 2 years.    Have a colonoscopy at age 50, or have a yearly FIT test (stool test). These exams screen for colon cancer.      Have a cholesterol test every 5 years, or more often if advised.    Have a diabetes test (fasting glucose) every three years. If you are at risk for diabetes, you should have this test more often.     If you are at risk for osteoporosis (brittle bone disease), think about having a bone density scan (DEXA).    Shots: Get a flu shot each year. Get a tetanus shot every 10 years.    Nutrition:     Eat at least 5 servings of fruits and vegetables each day.    Eat whole-grain bread, whole-wheat pasta and brown rice instead of white grains and rice.    Get adequate Calcium and Vitamin D.      Lifestyle    Exercise at least 150 minutes a week (30 minutes a day, 5 days a week). This will help you control your weight and prevent disease.    Limit alcohol to one drink per day.    No smoking.     Wear sunscreen to prevent skin cancer.     See your dentist every six months for an exam and cleaning.    See your eye doctor every 1 to 2 years.

## 2018-07-19 ENCOUNTER — TELEPHONE (OUTPATIENT)
Dept: FAMILY MEDICINE | Facility: OTHER | Age: 53
End: 2018-07-19

## 2018-07-19 ASSESSMENT — PATIENT HEALTH QUESTIONNAIRE - PHQ9: SUM OF ALL RESPONSES TO PHQ QUESTIONS 1-9: 9

## 2018-07-19 ASSESSMENT — ANXIETY QUESTIONNAIRES: GAD7 TOTAL SCORE: 10

## 2018-07-19 NOTE — TELEPHONE ENCOUNTER
Left message for patient to return call to schedule colonoscopy or EGD. If Alexandrea or Maria G are unavailable, please transfer to the surgery center.

## 2018-07-20 LAB
COPATH REPORT: NORMAL
PAP: NORMAL

## 2018-07-20 NOTE — TELEPHONE ENCOUNTER
Left message for patient to return call to schedule EGD/colonoscopy. If Maria G or Alexandrea are not available, please transfer to same day surgery

## 2018-07-23 LAB
FINAL DIAGNOSIS: NORMAL
HPV HR 12 DNA CVX QL NAA+PROBE: NEGATIVE
HPV16 DNA SPEC QL NAA+PROBE: NEGATIVE
HPV18 DNA SPEC QL NAA+PROBE: NEGATIVE
SPECIMEN DESCRIPTION: NORMAL
SPECIMEN SOURCE CVX/VAG CYTO: NORMAL

## 2018-07-23 NOTE — TELEPHONE ENCOUNTER
Left message for patient to return call to schedule colonoscopy or EGD. If Alexandrea or Maria G are unavailable, please transfer to the surgery center.     Letter sent

## 2018-10-02 ENCOUNTER — TELEPHONE (OUTPATIENT)
Dept: FAMILY MEDICINE | Facility: OTHER | Age: 53
End: 2018-10-02

## 2018-10-02 DIAGNOSIS — F33.0 MILD RECURRENT MAJOR DEPRESSION (H): ICD-10-CM

## 2018-10-02 DIAGNOSIS — F41.1 GAD (GENERALIZED ANXIETY DISORDER): ICD-10-CM

## 2018-10-02 NOTE — TELEPHONE ENCOUNTER
Reason for Call:  Medication or medication refill:    Do you use a [facility]?  Name of the pharmacy and phone number for the current request:  [facility] - [phone]    Name of the medication requested: Effexor    Other request: Pt states you prescribed taking 1 tablet for 14 days and then take 2. Pt states she can't take 2, she can only do 1. She has enough for a couple days, and needs a refill and wondering if you can change the directions to taking 1 a day?      Can we leave a detailed message on this number? YES    Phone number patient can be reached at: Cell number on file:    Telephone Information:   Mobile [phone]       Best Time: anytime    Call taken on 10/2/2018 at 2:27 PM by Nahomi Valadez

## 2018-10-03 RX ORDER — VENLAFAXINE HYDROCHLORIDE 37.5 MG/1
37.5 TABLET, EXTENDED RELEASE ORAL DAILY
Qty: 30 TABLET | Refills: 3 | Status: SHIPPED | OUTPATIENT
Start: 2018-10-03 | End: 2019-04-01

## 2018-10-03 NOTE — TELEPHONE ENCOUNTER
Attempted to call patient, no answer. Left message for a return call to the clinic when available.     SARAI MerrillN, RN  [facility]

## 2018-10-04 NOTE — TELEPHONE ENCOUNTER
Patient notified of refill. She already knew.  Wilfred Jaffe LPN,  October 4, 2018 12:16 PM,  [facility]

## 2019-01-02 ENCOUNTER — HOSPITAL ENCOUNTER (OUTPATIENT)
Dept: MAMMOGRAPHY | Facility: CLINIC | Age: 54
End: 2019-01-02
Attending: NURSE PRACTITIONER
Payer: COMMERCIAL

## 2019-01-02 DIAGNOSIS — N63.20 LEFT BREAST LUMP: ICD-10-CM

## 2019-01-02 DIAGNOSIS — Z12.31 VISIT FOR SCREENING MAMMOGRAM: ICD-10-CM

## 2019-01-02 PROCEDURE — 77066 DX MAMMO INCL CAD BI: CPT

## 2019-01-02 PROCEDURE — G0279 TOMOSYNTHESIS, MAMMO: HCPCS

## 2019-04-01 ENCOUNTER — OFFICE VISIT (OUTPATIENT)
Dept: FAMILY MEDICINE | Facility: OTHER | Age: 54
End: 2019-04-01
Payer: COMMERCIAL

## 2019-04-01 VITALS
DIASTOLIC BLOOD PRESSURE: 80 MMHG | HEART RATE: 95 BPM | HEIGHT: 66 IN | BODY MASS INDEX: 30.33 KG/M2 | WEIGHT: 188.7 LBS | TEMPERATURE: 97.3 F | SYSTOLIC BLOOD PRESSURE: 130 MMHG | RESPIRATION RATE: 20 BRPM | OXYGEN SATURATION: 98 %

## 2019-04-01 DIAGNOSIS — R03.0 ELEVATED BLOOD PRESSURE READING WITHOUT DIAGNOSIS OF HYPERTENSION: ICD-10-CM

## 2019-04-01 DIAGNOSIS — Z23 NEED FOR VACCINATION: ICD-10-CM

## 2019-04-01 DIAGNOSIS — M17.12 PRIMARY OSTEOARTHRITIS OF LEFT KNEE: ICD-10-CM

## 2019-04-01 DIAGNOSIS — F41.1 GAD (GENERALIZED ANXIETY DISORDER): Primary | ICD-10-CM

## 2019-04-01 DIAGNOSIS — Z12.11 SPECIAL SCREENING FOR MALIGNANT NEOPLASMS, COLON: ICD-10-CM

## 2019-04-01 DIAGNOSIS — Z11.59 NEED FOR HEPATITIS C SCREENING TEST: ICD-10-CM

## 2019-04-01 LAB
ANION GAP SERPL CALCULATED.3IONS-SCNC: 8 MMOL/L (ref 3–14)
BUN SERPL-MCNC: 16 MG/DL (ref 7–30)
CALCIUM SERPL-MCNC: 8.8 MG/DL (ref 8.5–10.1)
CHLORIDE SERPL-SCNC: 107 MMOL/L (ref 94–109)
CO2 SERPL-SCNC: 27 MMOL/L (ref 20–32)
CREAT SERPL-MCNC: 0.63 MG/DL (ref 0.52–1.04)
ERYTHROCYTE [DISTWIDTH] IN BLOOD BY AUTOMATED COUNT: 12.6 % (ref 10–15)
GFR SERPL CREATININE-BSD FRML MDRD: >90 ML/MIN/{1.73_M2}
GLUCOSE SERPL-MCNC: 81 MG/DL (ref 70–99)
HCT VFR BLD AUTO: 46.1 % (ref 35–47)
HCV AB SERPL QL IA: NONREACTIVE
HGB BLD-MCNC: 14.7 G/DL (ref 11.7–15.7)
MCH RBC QN AUTO: 29.2 PG (ref 26.5–33)
MCHC RBC AUTO-ENTMCNC: 31.9 G/DL (ref 31.5–36.5)
MCV RBC AUTO: 92 FL (ref 78–100)
PLATELET # BLD AUTO: 271 10E9/L (ref 150–450)
POTASSIUM SERPL-SCNC: 4.3 MMOL/L (ref 3.4–5.3)
RBC # BLD AUTO: 5.03 10E12/L (ref 3.8–5.2)
SODIUM SERPL-SCNC: 142 MMOL/L (ref 133–144)
WBC # BLD AUTO: 6.2 10E9/L (ref 4–11)

## 2019-04-01 PROCEDURE — 90715 TDAP VACCINE 7 YRS/> IM: CPT | Performed by: NURSE PRACTITIONER

## 2019-04-01 PROCEDURE — 80048 BASIC METABOLIC PNL TOTAL CA: CPT | Performed by: NURSE PRACTITIONER

## 2019-04-01 PROCEDURE — 90471 IMMUNIZATION ADMIN: CPT | Performed by: NURSE PRACTITIONER

## 2019-04-01 PROCEDURE — 36415 COLL VENOUS BLD VENIPUNCTURE: CPT | Performed by: NURSE PRACTITIONER

## 2019-04-01 PROCEDURE — 99214 OFFICE O/P EST MOD 30 MIN: CPT | Mod: 25 | Performed by: NURSE PRACTITIONER

## 2019-04-01 PROCEDURE — 86803 HEPATITIS C AB TEST: CPT | Performed by: NURSE PRACTITIONER

## 2019-04-01 PROCEDURE — 85027 COMPLETE CBC AUTOMATED: CPT | Performed by: NURSE PRACTITIONER

## 2019-04-01 RX ORDER — ETODOLAC 400 MG
400 TABLET ORAL 2 TIMES DAILY
Qty: 180 TABLET | Refills: 1 | Status: SHIPPED | OUTPATIENT
Start: 2019-04-01 | End: 2020-05-04

## 2019-04-01 RX ORDER — VENLAFAXINE HYDROCHLORIDE 75 MG/1
75 CAPSULE, EXTENDED RELEASE ORAL DAILY
Qty: 90 CAPSULE | Refills: 1 | Status: SHIPPED | OUTPATIENT
Start: 2019-04-01 | End: 2019-12-17

## 2019-04-01 ASSESSMENT — PATIENT HEALTH QUESTIONNAIRE - PHQ9: SUM OF ALL RESPONSES TO PHQ QUESTIONS 1-9: 12

## 2019-04-01 ASSESSMENT — MIFFLIN-ST. JEOR: SCORE: 1469.94

## 2019-04-01 NOTE — LETTER
April 1, 2019      Brittnee Herron  [address] [place] [address]        Dear ,    We are writing to inform you of your test results.    Your results were all normal or expected.  Please continue your current plan of care.     Resulted Orders   Basic metabolic panel  (Ca, Cl, CO2, Creat, Gluc, K, Na, BUN)   Result Value Ref Range    Sodium 142 133 - 144 mmol/L    Potassium 4.3 3.4 - 5.3 mmol/L    Chloride 107 94 - 109 mmol/L    Carbon Dioxide 27 20 - 32 mmol/L    Anion Gap 8 3 - 14 mmol/L    Glucose 81 70 - 99 mg/dL    Urea Nitrogen 16 7 - 30 mg/dL    Creatinine 0.63 0.52 - 1.04 mg/dL    GFR Estimate >90 >60 mL/min/[1.73_m2]      Comment:      Non  GFR Calc  Starting 12/18/2018, serum creatinine based estimated GFR (eGFR) will be   calculated using the Chronic Kidney Disease Epidemiology Collaboration   (CKD-EPI) equation.      GFR Estimate If Black >90 >60 mL/min/[1.73_m2]      Comment:       GFR Calc  Starting 12/18/2018, serum creatinine based estimated GFR (eGFR) will be   calculated using the Chronic Kidney Disease Epidemiology Collaboration   (CKD-EPI) equation.      Calcium 8.8 8.5 - 10.1 mg/dL   CBC with platelets   Result Value Ref Range    WBC 6.2 4.0 - 11.0 10e9/L    RBC Count 5.03 3.8 - 5.2 10e12/L    Hemoglobin 14.7 11.7 - 15.7 g/dL    Hematocrit 46.1 35.0 - 47.0 %    MCV 92 78 - 100 fl    MCH 29.2 26.5 - 33.0 pg    MCHC 31.9 31.5 - 36.5 g/dL    RDW 12.6 10.0 - 15.0 %    Platelet Count 271 150 - 450 10e9/L       If you have any questions or concerns, please call the clinic at the number listed above.       Sincerely,        CLEMENTE Taylor CNP

## 2019-04-01 NOTE — PROGRESS NOTES
SUBJECTIVE:   Brittnee Herron is a 53 year old female who presents to clinic today for the following health issues:      Depression Followup    Status since last visit: Stable no changes    See PHQ-9 for current symptoms.  Other associated symptoms: None    Complicating factors:   Significant life event:  Yes-  Son has mental illness   Current substance abuse:  None  Anxiety or Panic symptoms:  Yes-  anxiety    PHQ 12/28/2015 9/19/2016 7/18/2018   PHQ-9 Total Score 4 4 9   Q9: Suicide Ideation Not at all Not at all Not at all         Amount of exercise or physical activity: 2-3 days/week for an average of 30-45 minutes    Problems taking medications regularly: No    Medication side effects: none    Diet: regular (no restrictions)    On Effexor and needs a refill.  This is working well.     Also wants a refill of her Etodolac she takes for chronic bilateral knee pain.  Has seen orthopedics in the past, has had injections.      Problem list and histories reviewed & adjusted, as indicated.  Additional history: as documented    Patient Active Problem List   Diagnosis     Rhinitis, allergic seasonal     Mild recurrent major depression (H)     Primary osteoarthritis of left knee     LIAT (generalized anxiety disorder)     Elevated blood pressure reading without diagnosis of hypertension     Past Surgical History:   Procedure Laterality Date     ARTHROSCOPY KNEE WITH MEDIAL MENISCECTOMY Left 7/18/2017    Procedure: ARTHROSCOPY KNEE WITH MEDIAL MENISCECTOMY;  left knee arthroscopy with partial medial menisectomy;  Surgeon: Brittnee Bean MD;  Location: [facility] OR     C LIGATE FALLOPIAN TUBE,POSTPARTUM  08/03/2002    Postpartum bilateral tubal ligation with partial salpingectomy through a mini laparotomy     HC REMOVAL OF OVARIAN CYST(S)  1990    laparoscopic     HC REMOVE TONSILS/ADENOIDS,<11 Y/O         Social History     Tobacco Use     Smoking status: Never Smoker     Smokeless tobacco: Never Used   Substance Use  "Topics     Alcohol use: No     Family History   Problem Relation Age of Onset     Arthritis Mother      Hypertension Mother      Alcohol/Drug Maternal Grandmother         alcohol     Alcohol/Drug Maternal Grandfather         alcohol     Diabetes Maternal Grandfather         adult-onset     Depression Brother          Current Outpatient Medications   Medication Sig Dispense Refill     etodolac (LODINE) 400 MG tablet Take 1 tablet (400 mg) by mouth 2 times daily 180 tablet 1     venlafaxine (EFFEXOR-XR) 75 MG 24 hr capsule Take 1 capsule (75 mg) by mouth daily 90 capsule 1     Allergies   Allergen Reactions     No Known Drug Allergies      BP Readings from Last 3 Encounters:   04/01/19 130/80   07/18/18 (!) 156/98   07/18/17 (!) 158/97    Wt Readings from Last 3 Encounters:   04/01/19 85.6 kg (188 lb 11.2 oz)   07/18/18 89.5 kg (197 lb 6.4 oz)   09/20/17 89.4 kg (197 lb)                    Reviewed and updated as needed this visit by clinical staff  Tobacco  Allergies  Meds       Reviewed and updated as needed this visit by Provider         ROS:  Constitutional, HEENT, cardiovascular, pulmonary, gi and gu systems are negative, except as otherwise noted.    OBJECTIVE:     /80   Pulse 95   Temp 97.3  F (36.3  C) (Temporal)   Resp 20   Ht 1.68 m (5' 6.14\")   Wt 85.6 kg (188 lb 11.2 oz)   SpO2 98%   BMI 30.33 kg/m    Body mass index is 30.33 kg/m .  GENERAL: alert, no distress and obese  NECK: no adenopathy, no asymmetry, masses, or scars and thyroid normal to palpation  RESP: lungs clear to auscultation - no rales, rhonchi or wheezes  CV: regular rate and rhythm, normal S1 S2, no S3 or S4, no murmur, click or rub  ABDOMEN: soft, nontender, no hepatosplenomegaly, no masses and bowel sounds normal  MS: no gross musculoskeletal defects noted, no edema  PSYCH: normal affect and mentation    Diagnostic Test Results:  Pending     ASSESSMENT/PLAN:       1. LIAT (generalized anxiety disorder)  Chronic, controlled. "  No change in treatment plan.   - venlafaxine (EFFEXOR-XR) 75 MG 24 hr capsule; Take 1 capsule (75 mg) by mouth daily  Dispense: 90 capsule; Refill: 1    2. Primary osteoarthritis of left knee  Chronic, controlled.  No change in treatment plan.   - etodolac (LODINE) 400 MG tablet; Take 1 tablet (400 mg) by mouth 2 times daily  Dispense: 180 tablet; Refill: 1  - CBC with platelets    3. Elevated blood pressure reading without diagnosis of hypertension  Recheck was better today.  She will monitor this at home and let us know if it continues to be elevated, may need to consider medications at that time.   - Basic metabolic panel  (Ca, Cl, CO2, Creat, Gluc, K, Na, BUN)  - CBC with platelets    4. Special screening for malignant neoplasms, colon  - GASTROENTEROLOGY ADULT REF PROCEDURE ONLY [facility] [phone]; [place] Provider    5. Need for hepatitis C screening test  - Hepatitis C Screen Reflex to HCV RNA Quant and Genotype    6. Need for vaccination  - TDAP VACCINE (ADACEL) [74488.002]    See Patient Instructions    CLEMENTE Taylor [facility]

## 2019-04-01 NOTE — PATIENT INSTRUCTIONS
I refilled your medications.     Your blood pressure was slightly elevated today.   Have a nurse visit in 2 weeks to recheck this.  Or check at work.      Labs will be done today.   For normal results, you will receive a letter with the results in about 2 weeks.  If anything is abnormal or unexpected, someone from the clinic will call you.      Check at the pharmacy about the shingles vaccine.

## 2019-04-03 ENCOUNTER — TELEPHONE (OUTPATIENT)
Dept: FAMILY MEDICINE | Facility: OTHER | Age: 54
End: 2019-04-03

## 2019-04-03 NOTE — LETTER
[facility] [address] [address]  [phone]      April 8, 2019      Brittnee Herron  [address] [address] [place] [id number]      Dear Brittnee:     To better serve you, we are sending this letter to notify you that we have attempted to contact you by telephone to schedule the following procedure(s) ordered by your physician.     ___X____   Colonoscopy   _______   Upper GI Endoscopy (EGD)   _______   Colonoscopy and Upper GI Endoscopy    To provide the highest quality of care, we strongly encourage you to call and schedule the prescribed test/procedure at your earliest convenience.   The number to the Specialty Scheduling department is [phone] and the hours are 8:00am - 4:30pm Monday through Friday.   We look forward to hearing from you.    Sincerely,    [place] [facility]

## 2019-11-13 ENCOUNTER — TELEPHONE (OUTPATIENT)
Dept: FAMILY MEDICINE | Facility: OTHER | Age: 54
End: 2019-11-13

## 2019-11-13 NOTE — TELEPHONE ENCOUNTER
Patient is due for a PHQ-9.  Index start date:8/02/2019  Index end date:11/30/2019    Please call patient.

## 2019-11-19 ASSESSMENT — PATIENT HEALTH QUESTIONNAIRE - PHQ9: SUM OF ALL RESPONSES TO PHQ QUESTIONS 1-9: 4

## 2019-11-19 NOTE — TELEPHONE ENCOUNTER
I have contacted the pt and updated a PHQ-9.    PHQ-9 SCORE 11/19/2019   PHQ-9 Total Score -   PHQ-9 Total Score 4     Maria Del Rosario Howard CMA ([facility])

## 2019-12-17 DIAGNOSIS — F41.1 GAD (GENERALIZED ANXIETY DISORDER): ICD-10-CM

## 2019-12-17 RX ORDER — VENLAFAXINE HYDROCHLORIDE 75 MG/1
CAPSULE, EXTENDED RELEASE ORAL
Qty: 90 CAPSULE | Refills: 0 | Status: SHIPPED | OUTPATIENT
Start: 2019-12-17 | End: 2020-03-26

## 2019-12-17 NOTE — TELEPHONE ENCOUNTER
"Requested Prescriptions   Pending Prescriptions Disp Refills     venlafaxine (EFFEXOR-XR) 75 MG 24 hr capsule [Pharmacy Med Name: VENLAFAXINE HCL ER 75MG CP24] 90 capsule 1     Sig: TAKE ONE CAPSULE BY MOUTH ONCE DAILY   Last Written Prescription Date:  4/1/19  Last Fill Quantity: 90,  # refills: 1   Last office visit: 4/1/2019 with prescribing provider:  4/1/19   Future Office Visit:        Serotonin-Norepinephrine Reuptake Inhibitors  Passed - 12/17/2019 10:19 AM        Passed - Blood pressure under 140/90 in past 12 months     BP Readings from Last 3 Encounters:   04/01/19 130/80   07/18/18 (!) 156/98   07/18/17 (!) 158/97                 Passed - Recent (12 mo) or future (30 days) visit within the authorizing provider's specialty     Patient has had an office visit with the authorizing provider or a provider within the authorizing providers department within the previous 12 mos or has a future within next 30 days. See \"Patient Info\" tab in inbasket, or \"Choose Columns\" in Meds & Orders section of the refill encounter.              Passed - Medication is active on med list        Passed - Patient is age 18 or older        Passed - No active pregnancy on record        Passed - Normal serum creatinine on file in past 12 months     Recent Labs   Lab Test 04/01/19  1003   CR 0.63             Passed - No positive pregnancy test in past 12 months        PHQ-9 score:    PHQ-9 SCORE 11/19/2019   PHQ-9 Total Score -   PHQ-9 Total Score 4     "

## 2019-12-17 NOTE — TELEPHONE ENCOUNTER
Prescription approved per [facility] Refill Protocol.    SARAI MerrillN, RN  [facility]

## 2020-01-13 ENCOUNTER — TELEPHONE (OUTPATIENT)
Dept: FAMILY MEDICINE | Facility: OTHER | Age: 55
End: 2020-01-13

## 2020-01-13 NOTE — TELEPHONE ENCOUNTER
Panel Management Review      Patient has the following on her problem list:     Depression / Dysthymia review    Measure:  Needs PHQ-9 score of 4 or less during index window.  Administer PHQ-9 and if score is 5 or more, send encounter to provider for next steps.    5 - 7 month window range:     PHQ-9 SCORE 7/18/2018 4/1/2019 11/19/2019   PHQ-9 Total Score - - -   PHQ-9 Total Score 9 12 4       If PHQ-9 recheck is 5 or more, route to provider for next steps.    Patient is due for:  None      Composite cancer screening  Chart review shows that this patient is due/due soon for the following Colonoscopy  Summary:    Patient is due/failing the following:   COLONOSCOPY and PHYSICAL    Action needed:   Patient needs office visit for Physical . and Patient needs referral/order: Colonoscopy.     Type of outreach:    Sent letter.    Questions for provider review:    None                                                                                                                                    Yasmin Mahan MA

## 2020-01-13 NOTE — LETTER
[facility]  [address] [facility] [id number] [phone]        Brittnee SHI Germaine  [address] [facility] [address]      January 13, 2020      Dear Brittnee,    I care about your health and have reviewed your health plan, including your medical conditions, medication list, and lab results and am making recommendations based on this review, to better manage your health.    You are in particular need of attention regarding:  -Colon Cancer Screening  -Wellness (Physical) Visit     I am recommending that you:  -schedule a WELLNESS (Physical) APPOINTMENT with me.   I will check fasting labs the same day - nothing to eat except water and meds for 8-10 hours prior.  -schedule a COLONOSCOPY.  Colon cancer is now the second leading cause of cancer-related deaths in the [place] for both men and women.  There are over 130,000 new cases and 50,000 deaths per year from colon cancer.  A recent study, which included patients ages 55 to 79 found 50,400 American deaths from colorectal cancer could have been prevented if patients had undergone a colonoscopy in the previous 10 years.    If you have not had a colonoscopy, we encourage you to schedule by contacting us at [phone], Monday through Friday.  After hours, you may leave a message and we will return your call during normal business hours.      There is another option called a FIT test, if you don t wish to have a colonoscopy, which needs to be repeated every year.  It does replace the colonoscopy for colorectal cancer screening and can detect hidden bleeding in the lower colon.  If a positive result is obtained, you would be referred for a colonoscopy. Please discuss this option with your provider.      For patients under/uninsured, we recommend you contact the [employer] program. [employer] is a free colorectal cancer screening program that provides colonoscopies for eligible under/uninsured [place] men and women. If you  are interested in receiving a free colonoscopy, please call [employer] at [phone] (mention code [employer]) to see if you re eligible.     If you've had the preventative screening completed at another facility or feel you're not due for this screening, please call our clinic at the number listed above or send us a My Chart message so we can update our records. We would like to thank you in advance for taking the time to take care of your health.  If you have any questions, please don t hesitate to contact our clinic.    Sincerely,       Your [facility] Team

## 2020-03-26 DIAGNOSIS — F41.1 GAD (GENERALIZED ANXIETY DISORDER): ICD-10-CM

## 2020-03-26 RX ORDER — VENLAFAXINE HYDROCHLORIDE 75 MG/1
CAPSULE, EXTENDED RELEASE ORAL
Qty: 30 CAPSULE | Refills: 0 | Status: SHIPPED | OUTPATIENT
Start: 2020-03-26 | End: 2020-05-04

## 2020-03-26 NOTE — TELEPHONE ENCOUNTER
"Requested Prescriptions   Pending Prescriptions Disp Refills     venlafaxine (EFFEXOR-XR) 75 MG 24 hr capsule [Pharmacy Med Name: VENLAFAXINE HCL ER 75MG CP24] 90 capsule 0     Sig: TAKE ONE CAPSULE BY MOUTH ONCE DAILY   Last Written Prescription Date:  12/17/19  Last Fill Quantity: 90,  # refills: 0   Last office visit: 4/1/2019 with prescribing provider:     Future Office Visit:      PHQ 7/18/2018 4/1/2019 11/19/2019   PHQ-9 Total Score 9 12 4   Q9: Thoughts of better off dead/self-harm past 2 weeks Not at all Not at all Not at all         Serotonin-Norepinephrine Reuptake Inhibitors  Passed - 3/26/2020 12:49 PM        Passed - Blood pressure under 140/90 in past 12 months     BP Readings from Last 3 Encounters:   04/01/19 130/80   07/18/18 (!) 156/98   07/18/17 (!) 158/97                 Passed - Recent (12 mo) or future (30 days) visit within the authorizing provider's specialty     Patient has had an office visit with the authorizing provider or a provider within the authorizing providers department within the previous 12 mos or has a future within next 30 days. See \"Patient Info\" tab in inbasket, or \"Choose Columns\" in Meds & Orders section of the refill encounter.              Passed - Medication is active on med list        Passed - Patient is age 18 or older        Passed - No active pregnancy on record        Passed - Normal serum creatinine on file in past 12 months     Recent Labs   Lab Test 04/01/19  1003   CR 0.63       Ok to refill medication if creatinine is low          Passed - No positive pregnancy test in past 12 months             "

## 2020-05-04 ENCOUNTER — VIRTUAL VISIT (OUTPATIENT)
Dept: FAMILY MEDICINE | Facility: CLINIC | Age: 55
End: 2020-05-04
Payer: COMMERCIAL

## 2020-05-04 DIAGNOSIS — Z13.6 CARDIOVASCULAR SCREENING; LDL GOAL LESS THAN 160: ICD-10-CM

## 2020-05-04 DIAGNOSIS — M17.12 PRIMARY OSTEOARTHRITIS OF LEFT KNEE: ICD-10-CM

## 2020-05-04 DIAGNOSIS — I10 BENIGN ESSENTIAL HYPERTENSION: Primary | ICD-10-CM

## 2020-05-04 DIAGNOSIS — F41.1 GAD (GENERALIZED ANXIETY DISORDER): ICD-10-CM

## 2020-05-04 PROCEDURE — 99214 OFFICE O/P EST MOD 30 MIN: CPT | Mod: TEL | Performed by: NURSE PRACTITIONER

## 2020-05-04 RX ORDER — LISINOPRIL 10 MG/1
10 TABLET ORAL DAILY
Qty: 30 TABLET | Refills: 1 | Status: SHIPPED | OUTPATIENT
Start: 2020-05-04 | End: 2020-06-29

## 2020-05-04 RX ORDER — ETODOLAC 400 MG
400 TABLET ORAL 2 TIMES DAILY
Qty: 180 TABLET | Refills: 1 | Status: SHIPPED | OUTPATIENT
Start: 2020-05-04 | End: 2021-02-17

## 2020-05-04 RX ORDER — VENLAFAXINE HYDROCHLORIDE 75 MG/1
CAPSULE, EXTENDED RELEASE ORAL
Qty: 90 CAPSULE | Refills: 1 | Status: SHIPPED | OUTPATIENT
Start: 2020-05-04 | End: 2021-01-07

## 2020-05-04 ASSESSMENT — PAIN SCALES - GENERAL: PAINLEVEL: NO PAIN (0)

## 2020-05-04 ASSESSMENT — PATIENT HEALTH QUESTIONNAIRE - PHQ9: SUM OF ALL RESPONSES TO PHQ QUESTIONS 1-9: 6

## 2020-05-04 NOTE — PROGRESS NOTES
"Brittnee Herron is a 54 year old female who is being evaluated via a billable telephone visit.      The patient has been notified of following:     \"This telephone visit will be conducted via a call between you and your physician/provider. We have found that certain health care needs can be provided without the need for a physical exam.  This service lets us provide the care you need with a short phone conversation.  If a prescription is necessary we can send it directly to your pharmacy.  If lab work is needed we can place an order for that and you can then stop by our lab to have the test done at a later time.    Telephone visits are billed at different rates depending on your insurance coverage. During this emergency period, for some insurers they may be billed the same as an in-person visit.  Please reach out to your insurance provider with any questions.    If during the course of the call the physician/provider feels a telephone visit is not appropriate, you will not be charged for this service.\"    Patient has given verbal consent for Telephone visit?  Yes    What phone number would you like to be contacted at? [phone]    How would you like to obtain your AVS? Mail a copy    Subjective     Brittnee Herron is a 54 year old female who presents to clinic today for the following health issues:    HPI  Concern - High blood pressure   Onset: Ongoing for a while but has not followed up recently     Description:   165/117 at work. Sent home from due to headache     Intensity: moderate    Progression of Symptoms:  worsening    Accompanying Signs & Symptoms:  Headaches and dizziness     Previous history of similar problem:   NA     Precipitating factors:   Worsened by: NA     Alleviating factors:  Improved by: laying down     Therapies Tried and outcome: Laying down helps some.     She has also checked her BP periodically at home and it been elevated several times.  Has never been on medications for this.  "     Anxiety Follow-Up    How are you doing with your anxiety since your last visit? No change, stable    Are you having other symptoms that might be associated with anxiety? No    Have you had a significant life event? No     Are you feeling depressed? No    Do you have any concerns with your use of alcohol or other drugs? No    Social History     Tobacco Use     Smoking status: Never Smoker     Smokeless tobacco: Never Used   Substance Use Topics     Alcohol use: No     Drug use: No     LIAT-7 SCORE 9/19/2016 7/18/2018   Total Score 2 10     PHQ 4/1/2019 11/19/2019 5/4/2020   PHQ-9 Total Score 12 4 6   Q9: Thoughts of better off dead/self-harm past 2 weeks Not at all Not at all Not at all     On Venlafaxine.  This is working well, wants to continue on this.     Also needs a refill of her Etodolac she takes for OA pain.             Review of Systems   ROS COMP: Constitutional, HEENT, cardiovascular, pulmonary, gi and gu systems are negative, except as otherwise noted.       Objective   Reported vitals:  There were no vitals taken for this visit.   healthy, alert and no distress  PSYCH: Alert and oriented times 3; coherent speech, normal   rate and volume, able to articulate logical thoughts, able   to abstract reason, no tangential thoughts, no hallucinations   or delusions  Her affect is normal  RESP: No cough, no audible wheezing, able to talk in full sentences  Remainder of exam unable to be completed due to telephone visits    Diagnostic Test Results:  none         Assessment/Plan:  1. Benign essential hypertension  New diagnosis.  Start Lisinopril as prescribed.  She will continue to monitor BP at home and call us next week with readings.  Will do lab visit.   - lisinopril (ZESTRIL) 10 MG tablet; Take 1 tablet (10 mg) by mouth daily  Dispense: 30 tablet; Refill: 1  - **Basic metabolic panel FUTURE anytime; Future     CARDIOVASCULAR SCREENING; LDL GOAL LESS THAN 160  - Lipid panel reflex to direct LDL Fasting;  Future     LIAT (generalized anxiety disorder)  Chronic, controlled.  No change in treatment plan.   - venlafaxine (EFFEXOR-XR) 75 MG 24 hr capsule; TAKE ONE CAPSULE BY MOUTH ONCE DAILY  Dispense: 90 capsule; Refill: 1     Primary osteoarthritis of left knee  Chronic, controlled.  No change in treatment plan.   - etodolac (LODINE) 400 MG tablet; Take 1 tablet (400 mg) by mouth 2 times daily  Dispense: 180 tablet; Refill: 1    No follow-ups on file.      Phone call duration:  11 minutes    CLEMENTE Taylor CNP

## 2020-05-04 NOTE — NURSING NOTE
Health Maintenance Due   Topic Date Due     ADVANCE CARE PLANNING  1965     COLORECTAL CANCER SCREENING  10/07/1973     PREVENTIVE CARE VISIT  07/18/2019     PHQ-9  05/13/2020     Hilda Hernandez LPN........5/4/2020 11:01 AM

## 2020-09-01 DIAGNOSIS — I10 BENIGN ESSENTIAL HYPERTENSION: ICD-10-CM

## 2020-09-01 NOTE — TELEPHONE ENCOUNTER
"Routing refill request to provider for review/approval because:  Labs not current:  Potassium  T'd up 1 month for provider review.    Requested Prescriptions   Pending Prescriptions Disp Refills     lisinopril (ZESTRIL) 10 MG tablet [Pharmacy Med Name: LISINOPRIL 10MG TABS] 30 tablet 1     Sig: TAKE ONE TABLET BY MOUTH ONCE DAILY -- DUE FOR LABS   Last Written Prescription Date:  6/296/2020  Last Fill Quantity: 30,  # refills: 1   Last office visit: 5/4/2020  Future Office Visit:        ACE Inhibitors (Including Combos) Protocol Failed - 9/1/2020  4:25 PM        Failed - Blood pressure under 140/90 in past 12 months     BP Readings from Last 3 Encounters:   04/01/19 130/80   07/18/18 (!) 156/98   07/18/17 (!) 158/97           Failed - Normal serum creatinine on file in past 12 months     Recent Labs   Lab Test 04/01/19  1003   CR 0.63     Ok to refill medication if creatinine is low          Failed - Normal serum potassium on file in past 12 months     Recent Labs   Lab Test 04/01/19  1003   POTASSIUM 4.3             Passed - Recent (12 mo) or future (30 days) visit within the authorizing provider's specialty     Patient has had an office visit with the authorizing provider or a provider within the authorizing providers department within the previous 12 mos or has a future within next 30 days. See \"Patient Info\" tab in inbasket, or \"Choose Columns\" in Meds & Orders section of the refill encounter.              Passed - Medication is active on med list        Passed - Patient is age 18 or older        Passed - No active pregnancy on record        Passed - No positive pregnancy test within past 12 months         Nancy Badillo RN      "

## 2020-09-02 RX ORDER — LISINOPRIL 10 MG/1
TABLET ORAL
Qty: 30 TABLET | Refills: 0 | Status: SHIPPED | OUTPATIENT
Start: 2020-09-02 | End: 2020-10-08

## 2020-10-08 DIAGNOSIS — I10 BENIGN ESSENTIAL HYPERTENSION: ICD-10-CM

## 2020-10-08 RX ORDER — LISINOPRIL 10 MG/1
TABLET ORAL
Qty: 30 TABLET | Refills: 1 | Status: SHIPPED | OUTPATIENT
Start: 2020-10-08 | End: 2020-12-03 | Stop reason: SINTOL

## 2020-10-08 NOTE — TELEPHONE ENCOUNTER
Routing refill request to provider for review/approval because:  Labs not current:  BP, Creatinine, Potassium    STEPHANIE Merrill, RN  [facility]

## 2020-11-03 ENCOUNTER — VIRTUAL VISIT (OUTPATIENT)
Dept: FAMILY MEDICINE | Facility: CLINIC | Age: 55
End: 2020-11-03
Payer: COMMERCIAL

## 2020-11-03 DIAGNOSIS — R11.0 NAUSEA: Primary | ICD-10-CM

## 2020-11-03 DIAGNOSIS — Z13.6 CARDIOVASCULAR SCREENING; LDL GOAL LESS THAN 160: ICD-10-CM

## 2020-11-03 PROCEDURE — 99214 OFFICE O/P EST MOD 30 MIN: CPT | Mod: TEL | Performed by: NURSE PRACTITIONER

## 2020-11-03 RX ORDER — ONDANSETRON 4 MG/1
4 TABLET, ORALLY DISINTEGRATING ORAL EVERY 8 HOURS PRN
Qty: 30 TABLET | Refills: 1 | Status: SHIPPED | OUTPATIENT
Start: 2020-11-03 | End: 2022-02-03

## 2020-11-03 ASSESSMENT — PATIENT HEALTH QUESTIONNAIRE - PHQ9: SUM OF ALL RESPONSES TO PHQ QUESTIONS 1-9: 5

## 2020-11-03 NOTE — PROGRESS NOTES
"Brittnee Herron is a 55 year old female who is being evaluated via a billable telephone visit.      The patient has been notified of following:     \"This telephone visit will be conducted via a call between you and your physician/provider. We have found that certain health care needs can be provided without the need for a physical exam.  This service lets us provide the care you need with a short phone conversation.  If a prescription is necessary we can send it directly to your pharmacy.  If lab work is needed we can place an order for that and you can then stop by our lab to have the test done at a later time.    Telephone visits are billed at different rates depending on your insurance coverage. During this emergency period, for some insurers they may be billed the same as an in-person visit.  Please reach out to your insurance provider with any questions.    If during the course of the call the physician/provider feels a telephone visit is not appropriate, you will not be charged for this service.\"    Patient has given verbal consent for Telephone visit?  Yes    What phone number would you like to be contacted at? [phone]    How would you like to obtain your AVS? Mail a copy    Subjective     Brittnee Herron is a 55 year old female who presents via phone visit today for the following health issues:    HPI     Concern - Nausea  Onset: 1 month   Description: nausea off and on for 1 month, since she was diagnosed with Covid.  Intensity: moderate - when she is feeling nausea, denies dizziness  Progression of Symptoms:  same and intermittent  Accompanying Signs & Symptoms: just feels nausea, It will just be random. Denies any vomiting  Previous history of similar problem: n/a  Precipitating factors:        Worsened by: none  Alleviating factors:        Improved by: drinks a lot of water, eat cold foods (ice chips, popsicles and crackers)  Therapies tried and outcome:  none     Diagnose with COVID 1 month " "ago.  Initially had nausea, headaches, fatigue, cough.  No fevers.    Everything has resolved, except the nausea.   Intermittent.  Hits in \"waves\"  Lately happening daily.  Not specifically associated with eating or not eating.   No abdominal pain or vomiting. No stool changes.  Doesn't otherwise feel ill.       Review of Systems   Constitutional, HEENT, cardiovascular, pulmonary, gi and gu systems are negative, except as otherwise noted.       Objective   Vitals - Patient Reported  Pain Score: No Pain (0)        healthy, alert and no distress  PSYCH: Alert and oriented times 3; coherent speech, normal   rate and volume, able to articulate logical thoughts, able   to abstract reason, no tangential thoughts, no hallucinations   or delusions  Her affect is normal  RESP: No cough, no audible wheezing, able to talk in full sentences  Remainder of exam unable to be completed due to telephone visits    Pending         Assessment/Plan:    Assessment & Plan     Nausea  Will obtain some labs.  This may be related to COVID, but all of her other symptoms have resolved.  Treat with Zofran.  If this worsens or new symptoms develop, she will need in clinic evalation for an exam.   - ondansetron (ZOFRAN-ODT) 4 MG ODT tab; Take 1 tablet (4 mg) by mouth every 8 hours as needed for nausea  - **Comprehensive metabolic panel FUTURE anytime; Future  - Lipase; Future  - **CBC with platelets FUTURE anytime; Future    CARDIOVASCULAR SCREENING; LDL GOAL LESS THAN 160  - Lipid panel reflex to direct LDL Fasting; Future        See Patient Instructions    Return in about 1 week (around 11/10/2020) for Recheck only if not improving.    CLEMENTE Taylor [facility]    Phone call duration:  11 minutes                "

## 2020-11-04 NOTE — TELEPHONE ENCOUNTER
5/17/2018    Call Regarding Preventive Health Screening Colonoscopy, Mammogram and Cervical/PAP       Attempt 2    Message on voicemail     Comments: Clinic made prior attempt(s)        Outreach   Allie Salas      
5/23/2018    Call Regarding Preventive Health Screening Colonoscopy, Mammogram and Cervical/PAP    Attempt 3    Message on voicemail     Comments:       Outreach   CC    
Quality 226: Preventive Care And Screening: Tobacco Use: Screening And Cessation Intervention: Patient screened for tobacco use and is an ex/non-smoker
Detail Level: Detailed
Quality 110: Preventive Care And Screening: Influenza Immunization: Influenza Immunization Administered during Influenza season

## 2020-11-14 DIAGNOSIS — R11.0 NAUSEA: ICD-10-CM

## 2020-11-14 DIAGNOSIS — Z13.6 CARDIOVASCULAR SCREENING; LDL GOAL LESS THAN 160: ICD-10-CM

## 2020-11-14 LAB
ALBUMIN SERPL-MCNC: 4.1 G/DL (ref 3.4–5)
ALP SERPL-CCNC: 85 U/L (ref 40–150)
ALT SERPL W P-5'-P-CCNC: 32 U/L (ref 0–50)
ANION GAP SERPL CALCULATED.3IONS-SCNC: 4 MMOL/L (ref 3–14)
AST SERPL W P-5'-P-CCNC: 17 U/L (ref 0–45)
BILIRUB SERPL-MCNC: 0.5 MG/DL (ref 0.2–1.3)
BUN SERPL-MCNC: 15 MG/DL (ref 7–30)
CALCIUM SERPL-MCNC: 8.7 MG/DL (ref 8.5–10.1)
CHLORIDE SERPL-SCNC: 106 MMOL/L (ref 94–109)
CHOLEST SERPL-MCNC: 207 MG/DL
CO2 SERPL-SCNC: 30 MMOL/L (ref 20–32)
CREAT SERPL-MCNC: 0.69 MG/DL (ref 0.52–1.04)
ERYTHROCYTE [DISTWIDTH] IN BLOOD BY AUTOMATED COUNT: 12.7 % (ref 10–15)
GFR SERPL CREATININE-BSD FRML MDRD: >90 ML/MIN/{1.73_M2}
GLUCOSE SERPL-MCNC: 97 MG/DL (ref 70–99)
HCT VFR BLD AUTO: 44.8 % (ref 35–47)
HDLC SERPL-MCNC: 69 MG/DL
HGB BLD-MCNC: 14.2 G/DL (ref 11.7–15.7)
LDLC SERPL CALC-MCNC: 113 MG/DL
LIPASE SERPL-CCNC: 207 U/L (ref 73–393)
MCH RBC QN AUTO: 29.6 PG (ref 26.5–33)
MCHC RBC AUTO-ENTMCNC: 31.7 G/DL (ref 31.5–36.5)
MCV RBC AUTO: 93 FL (ref 78–100)
NONHDLC SERPL-MCNC: 138 MG/DL
PLATELET # BLD AUTO: 298 10E9/L (ref 150–450)
POTASSIUM SERPL-SCNC: 4.2 MMOL/L (ref 3.4–5.3)
PROT SERPL-MCNC: 8 G/DL (ref 6.8–8.8)
RBC # BLD AUTO: 4.8 10E12/L (ref 3.8–5.2)
SODIUM SERPL-SCNC: 140 MMOL/L (ref 133–144)
TRIGL SERPL-MCNC: 125 MG/DL
WBC # BLD AUTO: 6.6 10E9/L (ref 4–11)

## 2020-11-14 PROCEDURE — 80061 LIPID PANEL: CPT | Performed by: NURSE PRACTITIONER

## 2020-11-14 PROCEDURE — 80053 COMPREHEN METABOLIC PANEL: CPT | Performed by: NURSE PRACTITIONER

## 2020-11-14 PROCEDURE — 83690 ASSAY OF LIPASE: CPT | Performed by: NURSE PRACTITIONER

## 2020-11-14 PROCEDURE — 85027 COMPLETE CBC AUTOMATED: CPT | Performed by: NURSE PRACTITIONER

## 2020-12-03 ENCOUNTER — TELEPHONE (OUTPATIENT)
Dept: FAMILY MEDICINE | Facility: OTHER | Age: 55
End: 2020-12-03

## 2020-12-03 DIAGNOSIS — R03.0 ELEVATED BLOOD PRESSURE READING WITHOUT DIAGNOSIS OF HYPERTENSION: Primary | ICD-10-CM

## 2020-12-03 DIAGNOSIS — I10 BENIGN ESSENTIAL HYPERTENSION: ICD-10-CM

## 2020-12-03 RX ORDER — LOSARTAN POTASSIUM 25 MG/1
25 TABLET ORAL DAILY
Qty: 30 TABLET | Refills: 1 | Status: SHIPPED | OUTPATIENT
Start: 2020-12-03 | End: 2021-02-08

## 2020-12-03 NOTE — TELEPHONE ENCOUNTER
Stop Lisinopril.  Start Losartan instead.  She should have a blood pressure recheck in 2-3 weeks in clinic/pharamcy.     Electronically signed by Lissette Grant CNP.

## 2020-12-03 NOTE — TELEPHONE ENCOUNTER
Reason for call:  Symptom   Symptom or request: dizzy and rash and cough    Duration (how long have symptoms been present): since medication change to lisinopril  Have you been treated for this before?     Additional comments: call to advise on medication change. Send to [email or web address]    Phone number to reach patient:  Home number on file [phone] (home)    Best Time:  any    Can we leave a detailed message on this number?  YES    Travel screening: Not Applicable

## 2021-01-07 DIAGNOSIS — F41.1 GAD (GENERALIZED ANXIETY DISORDER): ICD-10-CM

## 2021-01-07 NOTE — TELEPHONE ENCOUNTER
"Routing refill request to provider for review/approval because:  BP failed protocol  T'd up 1 refill request for provider review.      Requested Prescriptions   Pending Prescriptions Disp Refills     venlafaxine (EFFEXOR-XR) 75 MG 24 hr capsule [Pharmacy Med Name: VENLAFAXINE 75MG ER CAPSULE] 90 capsule 1     Sig: TAKE 1 CAPSULE BY MOUTH EVERY DAY   Last Written Prescription Date:  5/4/2020  Last Fill Quantity: 90,  # refills: 1   Last office visit: 11/3/2020 with prescribing provider:     Future Office Visit:        Serotonin-Norepinephrine Reuptake Inhibitors  Failed - 1/7/2021  2:36 PM        Failed - Blood pressure under 140/90 in past 12 months     BP Readings from Last 3 Encounters:   04/01/19 130/80   07/18/18 (!) 156/98   07/18/17 (!) 158/97           Passed - Recent (12 mo) or future (30 days) visit within the authorizing provider's specialty     Patient has had an office visit with the authorizing provider or a provider within the authorizing providers department within the previous 12 mos or has a future within next 30 days. See \"Patient Info\" tab in inbasket, or \"Choose Columns\" in Meds & Orders section of the refill encounter.            Passed - Medication is active on med list        Passed - Patient is age 18 or older        Passed - No active pregnancy on record        Passed - Normal serum creatinine on file in past 12 months     Recent Labs   Lab Test 11/14/20  0854   CR 0.69     Ok to refill medication if creatinine is low          Passed - No positive pregnancy test in past 12 months         Nancy Badillo RN      "

## 2021-01-08 RX ORDER — VENLAFAXINE HYDROCHLORIDE 75 MG/1
CAPSULE, EXTENDED RELEASE ORAL
Qty: 30 CAPSULE | Refills: 0 | Status: SHIPPED | OUTPATIENT
Start: 2021-01-08 | End: 2021-02-26

## 2021-01-08 NOTE — TELEPHONE ENCOUNTER
[facility] Assist in setting up an appt.................Wilfred Jaffe LPN,   January 8, 2021,      11:26 AM,   [facility]

## 2021-01-08 NOTE — TELEPHONE ENCOUNTER
One month refill sent to pharmacy. Patient should have in office follow-up as we have not seen her since 04/2019.     Ellen Mendieta PA-C  Covering for Lissette Grant

## 2021-01-10 ENCOUNTER — HEALTH MAINTENANCE LETTER (OUTPATIENT)
Age: 56
End: 2021-01-10

## 2021-01-14 ENCOUNTER — HOSPITAL ENCOUNTER (OUTPATIENT)
Dept: MAMMOGRAPHY | Facility: CLINIC | Age: 56
Discharge: HOME | End: 2021-01-14
Attending: NURSE PRACTITIONER | Admitting: NURSE PRACTITIONER
Payer: COMMERCIAL

## 2021-01-14 DIAGNOSIS — Z12.31 VISIT FOR SCREENING MAMMOGRAM: ICD-10-CM

## 2021-01-14 PROCEDURE — 77063 BREAST TOMOSYNTHESIS BI: CPT

## 2021-01-25 NOTE — PROGRESS NOTES
"  Assessment & Plan     Recurrent major depressive disorder, in full remission (H)  PHQ-9 is 4.  wellcontrolled on current medications.  Discussed good self care, sleep hygiene, mindfulness therapies and meditation.    Essential hypertension  Will increase losartan to 50mg.  She will check her bp at home and at work and let me know readings.  Recheck lytes.  Discussed dietary and lifestyle changes. .    - **Basic metabolic panel FUTURE anytime; Future  - losartan (COZAAR) 50 MG tablet; Take 1 tablet (50 mg) by mouth daily    Referral for colonoscopy    Atypical chest pain- stress echo scheduled.  No further episodes.     35 minutes spent on the date of the encounter doing chart review, review of test results, interpretation of tests, patient visit and documentation   {Provider  Link to [facility] Help Grid :817144}     BMI:   Estimated body mass index is 33.72 kg/m  as calculated from the following:    Height as of this encounter: 1.669 m (5' 5.7\").    Weight as of this encounter: 93.9 kg (207 lb).           Return in about 2 weeks (around 3/4/2021) for recheck with PCP.    Alexandrea Mcqueen NP  [facility]    Bety Beaulieu is a 55 year old who presents to clinic today for the following health issues     History of Present Illness       She eats 2-3 servings of fruits and vegetables daily.She consumes 1 sweetened beverage(s) daily.She exercises with enough effort to increase her heart rate 9 or less minutes per day.  She exercises with enough effort to increase her heart rate 3 or less days per week.   She is taking medications regularly.         Hypertension Follow-up      Do you check your blood pressure regularly outside of the clinic? No     Are you following a low salt diet? Yes    Are your blood pressures ever more than 140 on the top number (systolic) OR more   than 90 on the bottom number (diastolic), for example 140/90? No- NA     Depression Followup    How are you doing with " your depression since your last visit? No change    Are you having other symptoms that might be associated with depression? No    Have you had a significant life event?  No     Are you feeling anxious or having panic attacks?   Yes:  Anxiety once in awhile     Do you have any concerns with your use of alcohol or other drugs? No     Weight has gone up.    No chest pain since last episode.  Needs to schedule stress echo.  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  She works through her stress- drinks water.  Does word search, reading.      Social History     Tobacco Use     Smoking status: Never Smoker     Smokeless tobacco: Never Used   Substance Use Topics     Alcohol use: No     Drug use: No     PHQ 5/4/2020 11/3/2020 2/18/2021   PHQ-9 Total Score 6 5 4   Q9: Thoughts of better off dead/self-harm past 2 weeks Not at all Not at all Not at all     LIAT-7 SCORE 9/19/2016 7/18/2018 2/18/2021   Total Score - - 3 (minimal anxiety)   Total Score 2 10 3     Last PHQ-9 2/18/2021   1.  Little interest or pleasure in doing things 0   2.  Feeling down, depressed, or hopeless 1   3.  Trouble falling or staying asleep, or sleeping too much 1   4.  Feeling tired or having little energy 1   5.  Poor appetite or overeating 1   6.  Feeling bad about yourself 0   7.  Trouble concentrating 0   8.  Moving slowly or restless 0   Q9: Thoughts of better off dead/self-harm past 2 weeks 0   PHQ-9 Total Score 4   Difficulty at work, home, or with people -     LIAT-7  2/18/2021   1. Feeling nervous, anxious, or on edge 1   2. Not being able to stop or control worrying 0   3. Worrying too much about different things 1   4. Trouble relaxing 0   5. Being so restless that it is hard to sit still 0   6. Becoming easily annoyed or irritable 1   7. Feeling afraid, as if something awful might happen 0   LIAT-7 Total Score 3   If you checked any problems, how difficult have they made it for you to do your work, take care of things at home, or get  "along with other people? -       Suicide Assessment Five-step Evaluation and Treatment (SAFE-T)        Review of Systems         Objective    BP (!) 134/90   Pulse 110   Temp 97.6  F (36.4  C)   Resp 10   Ht 1.669 m (5' 5.7\")   Wt 93.9 kg (207 lb)   LMP 07/10/2018   SpO2 96%   BMI 33.72 kg/m    Body mass index is 33.72 kg/m .  Physical Exam                   Answers for HPI/ROS submitted by the patient on 2/18/2021   Chronic problems general questions HPI Form  If you checked off any problems, how difficult have these problems made it for you to do your work, take care of things at home, or get along with other people?: Not difficult at all  PHQ9 TOTAL SCORE: 4  LIAT 7 TOTAL SCORE: 3    "

## 2021-02-01 ENCOUNTER — VIRTUAL VISIT (OUTPATIENT)
Dept: FAMILY MEDICINE | Facility: CLINIC | Age: 56
End: 2021-02-01
Payer: COMMERCIAL

## 2021-02-01 ENCOUNTER — ALLIED HEALTH/NURSE VISIT (OUTPATIENT)
Dept: FAMILY MEDICINE | Facility: CLINIC | Age: 56
End: 2021-02-01
Payer: COMMERCIAL

## 2021-02-01 DIAGNOSIS — Z13.6 CARDIOVASCULAR SCREENING; LDL GOAL LESS THAN 160: ICD-10-CM

## 2021-02-01 DIAGNOSIS — K21.00 GASTROESOPHAGEAL REFLUX DISEASE WITH ESOPHAGITIS WITHOUT HEMORRHAGE: ICD-10-CM

## 2021-02-01 DIAGNOSIS — R07.89 ATYPICAL CHEST PAIN: Primary | ICD-10-CM

## 2021-02-01 DIAGNOSIS — I10 BENIGN ESSENTIAL HYPERTENSION: ICD-10-CM

## 2021-02-01 LAB
ALBUMIN SERPL-MCNC: 3.7 G/DL (ref 3.4–5)
ALP SERPL-CCNC: 84 U/L (ref 40–150)
ALT SERPL W P-5'-P-CCNC: 26 U/L (ref 0–50)
ANION GAP SERPL CALCULATED.3IONS-SCNC: 3 MMOL/L (ref 3–14)
AST SERPL W P-5'-P-CCNC: 18 U/L (ref 0–45)
BASOPHILS # BLD AUTO: 0 10E9/L (ref 0–0.2)
BASOPHILS NFR BLD AUTO: 0.3 %
BILIRUB SERPL-MCNC: 0.3 MG/DL (ref 0.2–1.3)
BUN SERPL-MCNC: 15 MG/DL (ref 7–30)
CALCIUM SERPL-MCNC: 9 MG/DL (ref 8.5–10.1)
CHLORIDE SERPL-SCNC: 103 MMOL/L (ref 94–109)
CO2 SERPL-SCNC: 32 MMOL/L (ref 20–32)
CREAT SERPL-MCNC: 0.63 MG/DL (ref 0.52–1.04)
DIFFERENTIAL METHOD BLD: NORMAL
EOSINOPHIL NFR BLD AUTO: 1.4 %
ERYTHROCYTE [DISTWIDTH] IN BLOOD BY AUTOMATED COUNT: 12.1 % (ref 10–15)
GFR SERPL CREATININE-BSD FRML MDRD: >90 ML/MIN/{1.73_M2}
GLUCOSE SERPL-MCNC: 118 MG/DL (ref 70–99)
HCT VFR BLD AUTO: 42.5 % (ref 35–47)
HGB BLD-MCNC: 13.9 G/DL (ref 11.7–15.7)
IMM GRANULOCYTES # BLD: 0 10E9/L (ref 0–0.4)
IMM GRANULOCYTES NFR BLD: 0.1 %
LYMPHOCYTES # BLD AUTO: 2 10E9/L (ref 0.8–5.3)
LYMPHOCYTES NFR BLD AUTO: 27.5 %
MCH RBC QN AUTO: 29.5 PG (ref 26.5–33)
MCHC RBC AUTO-ENTMCNC: 32.7 G/DL (ref 31.5–36.5)
MCV RBC AUTO: 90 FL (ref 78–100)
MONOCYTES # BLD AUTO: 0.7 10E9/L (ref 0–1.3)
MONOCYTES NFR BLD AUTO: 9 %
NEUTROPHILS # BLD AUTO: 4.4 10E9/L (ref 1.6–8.3)
NEUTROPHILS NFR BLD AUTO: 61.7 %
NRBC # BLD AUTO: 0 10*3/UL
NRBC BLD AUTO-RTO: 0 /100
PLATELET # BLD AUTO: 282 10E9/L (ref 150–450)
POTASSIUM SERPL-SCNC: 4 MMOL/L (ref 3.4–5.3)
PROT SERPL-MCNC: 8 G/DL (ref 6.8–8.8)
RBC # BLD AUTO: 4.71 10E12/L (ref 3.8–5.2)
SODIUM SERPL-SCNC: 138 MMOL/L (ref 133–144)
TROPONIN I SERPL-MCNC: <0.015 UG/L (ref 0–0.04)
WBC # BLD AUTO: 7.2 10E9/L (ref 4–11)

## 2021-02-01 PROCEDURE — 85025 COMPLETE CBC W/AUTO DIFF WBC: CPT | Performed by: NURSE PRACTITIONER

## 2021-02-01 PROCEDURE — 99207 PR NO CHARGE NURSE ONLY: CPT

## 2021-02-01 PROCEDURE — 93000 ELECTROCARDIOGRAM COMPLETE: CPT

## 2021-02-01 PROCEDURE — 80053 COMPREHEN METABOLIC PANEL: CPT | Performed by: NURSE PRACTITIONER

## 2021-02-01 PROCEDURE — 99214 OFFICE O/P EST MOD 30 MIN: CPT | Mod: TEL | Performed by: NURSE PRACTITIONER

## 2021-02-01 PROCEDURE — 36415 COLL VENOUS BLD VENIPUNCTURE: CPT | Performed by: NURSE PRACTITIONER

## 2021-02-01 PROCEDURE — 84484 ASSAY OF TROPONIN QUANT: CPT | Performed by: NURSE PRACTITIONER

## 2021-02-01 NOTE — PROGRESS NOTES
Brittnee is a 55 year old who is being evaluated via a billable telephone visit.          What phone number would you like to be contacted at? [phone]  How would you like to obtain your AVS? Stephenhargenaro  Assessment & Plan     Atypical chest pain  Episode this am where she had intense pain between her breasts that radiated up to her jaw, with sweating and nausea I am very concerned for acute ischemia.  Risk factors include obesity, hyperlipidemia, elevated blood pressure. She did not mention this episode today until almost the end of the phone visit.  I stressed to her the urgency of her evalution in clinic- she declined an office visit as she was worried about cost.  She was agreeable to doing labwork and EKG- CBC, troponin, CMP. Discussed if she had this pain again to call 911go immediately to the emergency room.    - CBC with platelets and differential  - Comprehensive metabolic panel (BMP + Alb, Alk Phos, ALT, AST, Total. Bili, TP)  - EKG 12-lead complete w/read - Clinics  - Troponin I    Gastroesophageal reflux disease with esophagitis without hemorrhage  Continue 40mg PPI.  Add pepcid.  Avoid spicy, fatty, acidic, large meals.    - CBC with platelets and differential  - Comprehensive metabolic panel (BMP + Alb, Alk Phos, ALT, AST, Total. Bili, TP)  - EKG 12-lead complete w/read - Clinics  - Troponin I    35 minutes spent on the date of the encounter doing chart review, review of test results, interpretation of tests, patient visit and documentation     EKG was NSR- no ischemia, troponin negative. layla w       Return in about 4 weeks (around 3/1/2021) for recheck with PCP.    Alexandrea Mcqueen NP  [facility]     Brittnee is a 55 year old who presents to clinic today for the following health issues     HPI       GERD/Heartburn  Onset/Duration: 7 years off and on  Description: sharp  Pains in between breasts- one time went down back. Patient left work two times due to this.    Intensity: severe, 9/10  Progression of Symptoms: worsening and intermittent  Accompanying Signs & Symptoms:  Does it feel like food gets stuck or trouble swallowing: no  Nausea: no  Vomiting (bloody?): no  Abdominal Pain: no  Black-Tarry stools: no  Bloody stools: no  History:  Previous similar episodes: YES- off and on  Previous ulcers: no  Precipitating factors:   Caffeine use: YES- 8 oz or less   Alcohol use: no  NSAID/Aspirin use: YES- 81 mg daily aspirin  Tobacco use: no  Worse with has been taking Prilosec daily and watching her diet.  Alleviating factors: Prilosec has been helping   Therapies tried and outcome:             Lifestyle changes: see above            Medications: Omeprazole (Prilosec) and antacids (Tums)     Has had acid reflux for 7 years.  Is taking one a day normally.  Today and yesterday has been having a lot more acid reflux.  She does not think due to stress.  Is avoiding spicy food and caffiene.  Trying to eat healthier.  Today happened after breakfast- had cream of wheat and water- couple sips of coffee.  Pain was between breasts and under breasts- this is normal location for her.  She notices pain after spicy meals.  Burping.  Still has gall bladder.  Staying with bland meals- slimfast or smart ones.  At work when eats has some fat but no spice.  One night the pain did wake her from her sleep. tums helps with pain- had to sit down due to pain- was sweating and nausea Went home from work pain was so bad.  It will come and go.  It also radiates to jaw- in her teeth like toothache- this has not happened to her.  No family history of early heart disease.      If walks a block will maybe get a little short of breath.      Review of Systems   Constitutional, HEENT, cardiovascular, pulmonary, gi and gu systems are negative, except as otherwise noted.      Objective           Vitals:  No vitals were obtained today due to virtual visit.    Physical Exam   healthy, alert and no distress  PSYCH:  Alert and oriented times 3; coherent speech, normal   rate and volume, able to articulate logical thoughts, able   to abstract reason, no tangential thoughts, no hallucinations   or delusions  Her affect is normal and pleasant  RESP: No cough, no audible wheezing, able to talk in full sentences  Remainder of exam unable to be completed due to telephone visits    No results found for this or any previous visit (from the past 24 hour(s)).            Phone call duration: 18 minutes

## 2021-02-01 NOTE — PROGRESS NOTES
Alexandrea Mcqueen ordered an EKG not realizing it was for now not future. I will reorder and Alexandrea looked it over and stated it was okay. She ordered other tests and I helped her schedule them  Cyndy Treviño MA 2/1/2021

## 2021-02-01 NOTE — PATIENT INSTRUCTIONS
Take two omeprazole every morning on an empty stomach  Could take pepcid 20mg at night also    Avoid fatty, spicy, acidic foods.

## 2021-02-05 DIAGNOSIS — I10 BENIGN ESSENTIAL HYPERTENSION: ICD-10-CM

## 2021-02-08 DIAGNOSIS — I10 BENIGN ESSENTIAL HYPERTENSION: ICD-10-CM

## 2021-02-08 RX ORDER — LOSARTAN POTASSIUM 25 MG/1
25 TABLET ORAL DAILY
Qty: 30 TABLET | Refills: 1 | Status: SHIPPED | OUTPATIENT
Start: 2021-02-08 | End: 2021-09-28

## 2021-02-08 RX ORDER — LOSARTAN POTASSIUM 25 MG/1
TABLET ORAL
Qty: 30 TABLET | Refills: 1 | Status: SHIPPED | OUTPATIENT
Start: 2021-02-08 | End: 2021-04-01

## 2021-02-08 NOTE — TELEPHONE ENCOUNTER
Routing refill request to provider for review/approval because:  Labs not current:  BP (2019)  BP Readings from Last 3 Encounters:   04/01/19 130/80   07/18/18 (!) 156/98   07/18/17 (!) 158/97     Last Written Prescription Date:  12/3/2020  Last Fill Quantity: 30,  # refills: 1   Last office visit: Visit date not found with prescribing provider:  2/1/21   Future Office Visit:   Next 5 appointments (look out 90 days)    Feb 18, 2021 10:30 AM  Office Visit with Alexandrea Mcqueen NP  [facility] ([facility] ) [address] [place] [id number] [phone]           SARAI SahuN, RN

## 2021-02-08 NOTE — TELEPHONE ENCOUNTER
Routing refill request to provider for review/approval because:  No current BP on file.     Deidra Dubose Rn

## 2021-02-16 DIAGNOSIS — M17.12 PRIMARY OSTEOARTHRITIS OF LEFT KNEE: ICD-10-CM

## 2021-02-17 RX ORDER — ETODOLAC 400 MG
TABLET ORAL
Qty: 180 TABLET | Refills: 1 | Status: SHIPPED | OUTPATIENT
Start: 2021-02-17 | End: 2021-10-08

## 2021-02-18 ENCOUNTER — OFFICE VISIT (OUTPATIENT)
Dept: FAMILY MEDICINE | Facility: CLINIC | Age: 56
End: 2021-02-18
Payer: COMMERCIAL

## 2021-02-18 VITALS
OXYGEN SATURATION: 96 % | HEART RATE: 110 BPM | TEMPERATURE: 97.6 F | DIASTOLIC BLOOD PRESSURE: 90 MMHG | RESPIRATION RATE: 10 BRPM | BODY MASS INDEX: 33.27 KG/M2 | HEIGHT: 66 IN | SYSTOLIC BLOOD PRESSURE: 134 MMHG | WEIGHT: 207 LBS

## 2021-02-18 DIAGNOSIS — I10 ESSENTIAL HYPERTENSION: ICD-10-CM

## 2021-02-18 DIAGNOSIS — R07.89 ATYPICAL CHEST PAIN: ICD-10-CM

## 2021-02-18 DIAGNOSIS — F33.42 RECURRENT MAJOR DEPRESSIVE DISORDER, IN FULL REMISSION (H): Primary | ICD-10-CM

## 2021-02-18 DIAGNOSIS — Z12.11 SPECIAL SCREENING FOR MALIGNANT NEOPLASMS, COLON: ICD-10-CM

## 2021-02-18 PROCEDURE — 99214 OFFICE O/P EST MOD 30 MIN: CPT | Performed by: NURSE PRACTITIONER

## 2021-02-18 RX ORDER — LOSARTAN POTASSIUM 50 MG/1
50 TABLET ORAL DAILY
Qty: 90 TABLET | Refills: 0 | Status: SHIPPED | OUTPATIENT
Start: 2021-02-18 | End: 2021-05-27

## 2021-02-18 ASSESSMENT — ANXIETY QUESTIONNAIRES
1. FEELING NERVOUS, ANXIOUS, OR ON EDGE: SEVERAL DAYS
2. NOT BEING ABLE TO STOP OR CONTROL WORRYING: NOT AT ALL
GAD7 TOTAL SCORE: 3
7. FEELING AFRAID AS IF SOMETHING AWFUL MIGHT HAPPEN: NOT AT ALL
7. FEELING AFRAID AS IF SOMETHING AWFUL MIGHT HAPPEN: NOT AT ALL
4. TROUBLE RELAXING: NOT AT ALL
GAD7 TOTAL SCORE: 3
3. WORRYING TOO MUCH ABOUT DIFFERENT THINGS: SEVERAL DAYS
GAD7 TOTAL SCORE: 3
5. BEING SO RESTLESS THAT IT IS HARD TO SIT STILL: NOT AT ALL
6. BECOMING EASILY ANNOYED OR IRRITABLE: SEVERAL DAYS

## 2021-02-18 ASSESSMENT — PAIN SCALES - GENERAL: PAINLEVEL: NO PAIN (0)

## 2021-02-18 ASSESSMENT — PATIENT HEALTH QUESTIONNAIRE - PHQ9
SUM OF ALL RESPONSES TO PHQ QUESTIONS 1-9: 4
SUM OF ALL RESPONSES TO PHQ QUESTIONS 1-9: 4
10. IF YOU CHECKED OFF ANY PROBLEMS, HOW DIFFICULT HAVE THESE PROBLEMS MADE IT FOR YOU TO DO YOUR WORK, TAKE CARE OF THINGS AT HOME, OR GET ALONG WITH OTHER PEOPLE: NOT DIFFICULT AT ALL

## 2021-02-18 ASSESSMENT — MIFFLIN-ST. JEOR: SCORE: 1535.94

## 2021-02-19 ENCOUNTER — TELEPHONE (OUTPATIENT)
Dept: FAMILY MEDICINE | Facility: CLINIC | Age: 56
End: 2021-02-19

## 2021-02-19 DIAGNOSIS — Z12.11 SPECIAL SCREENING FOR MALIGNANT NEOPLASMS, COLON: Primary | ICD-10-CM

## 2021-02-19 PROBLEM — R03.0 ELEVATED BLOOD PRESSURE READING WITHOUT DIAGNOSIS OF HYPERTENSION: Status: RESOLVED | Noted: 2018-07-18 | Resolved: 2021-02-19

## 2021-02-19 PROBLEM — I10 ESSENTIAL HYPERTENSION: Status: ACTIVE | Noted: 2021-02-19

## 2021-02-19 ASSESSMENT — PATIENT HEALTH QUESTIONNAIRE - PHQ9: SUM OF ALL RESPONSES TO PHQ QUESTIONS 1-9: 4

## 2021-02-19 ASSESSMENT — ANXIETY QUESTIONNAIRES: GAD7 TOTAL SCORE: 3

## 2021-02-19 NOTE — TELEPHONE ENCOUNTER
Colonoscopy order placed 2/18. Diagnosis is missing, please place new order with Dx so we can schedule.    Thank you!

## 2021-02-22 ENCOUNTER — TELEPHONE (OUTPATIENT)
Dept: FAMILY MEDICINE | Facility: CLINIC | Age: 56
End: 2021-02-22

## 2021-02-22 NOTE — LETTER
[address] [address]  [phone]      February 23, 2021      Brittnee Herron  [id number] [address] [place] [address]      Dear Brittnee:     To better serve you, we are sending this letter to notify you that we have attempted to contact you by telephone to schedule the following procedure(s) ordered by your physician.     ___X____   Colonoscopy   _______   Upper GI Endoscopy (EGD)   _______   Colonoscopy and Upper GI Endoscopy    To provide the highest quality of care, we strongly encourage you to call and schedule the prescribed test/procedure at your earliest convenience.   The number to the Specialty Scheduling department is [phone] and the hours are 8:00am - 4:30pm Monday through Friday.   We look forward to hearing from you.    Sincerely,    [facility]

## 2021-02-25 DIAGNOSIS — F41.1 GAD (GENERALIZED ANXIETY DISORDER): ICD-10-CM

## 2021-02-26 RX ORDER — VENLAFAXINE HYDROCHLORIDE 75 MG/1
CAPSULE, EXTENDED RELEASE ORAL
Qty: 90 CAPSULE | Refills: 1 | Status: SHIPPED | OUTPATIENT
Start: 2021-02-26 | End: 2021-10-22

## 2021-02-26 NOTE — TELEPHONE ENCOUNTER
Routing refill request to provider for review/approval because:  Labs out of range:  BP  BP Readings from Last 3 Encounters:   02/18/21 (!) 134/90   04/01/19 130/80   07/18/18 (!) 156/98     Last Written Prescription Date:  1/8/2020  Last Fill Quantity: 30,  # refills: 0   Last office visit: 2/18/2021 with prescribing provider:     Future Office Visit:   Next 5 appointments (look out 90 days)    Mar 04, 2021 10:30 AM  Office Visit with Alexandrea Mcqueen NP  [facility] ([facility] ) [address] [place] [id number] [phone]         Mackenzie Moreno, SARAIN, RN

## 2021-03-15 ENCOUNTER — HOSPITAL ENCOUNTER (OUTPATIENT)
Dept: CARDIOLOGY | Facility: CLINIC | Age: 56
Discharge: HOME | End: 2021-03-15
Attending: NURSE PRACTITIONER | Admitting: NURSE PRACTITIONER
Payer: COMMERCIAL

## 2021-03-15 DIAGNOSIS — R07.89 ATYPICAL CHEST PAIN: ICD-10-CM

## 2021-03-15 PROCEDURE — 93321 DOPPLER ECHO F-UP/LMTD STD: CPT | Mod: 26 | Performed by: INTERNAL MEDICINE

## 2021-03-15 PROCEDURE — 93325 DOPPLER ECHO COLOR FLOW MAPG: CPT | Mod: 26 | Performed by: INTERNAL MEDICINE

## 2021-03-15 PROCEDURE — 93350 STRESS TTE ONLY: CPT | Mod: 26 | Performed by: INTERNAL MEDICINE

## 2021-03-15 PROCEDURE — 93016 CV STRESS TEST SUPVJ ONLY: CPT | Performed by: INTERNAL MEDICINE

## 2021-03-15 PROCEDURE — 255N000002 HC RX 255 OP 636: Performed by: INTERNAL MEDICINE

## 2021-03-15 PROCEDURE — 999N000208 ECHO STRESS ECHOCARDIOGRAM

## 2021-03-15 PROCEDURE — 93018 CV STRESS TEST I&R ONLY: CPT | Performed by: INTERNAL MEDICINE

## 2021-03-15 RX ADMIN — HUMAN ALBUMIN MICROSPHERES AND PERFLUTREN 5 ML: 10; .22 INJECTION, SOLUTION INTRAVENOUS at 10:40

## 2021-03-31 NOTE — PROGRESS NOTES
"    Assessment & Plan     Essential hypertension  Elevated on exam today despite increasing her losartan last visit.  Will add chlorthalidone.  Discussed dietary and lifestyle changes.  She will monitor home bp and let me know in two weeks the readings.   - chlorthalidone (HYGROTON) 25 MG tablet; Take 0.5 tablets (12.5 mg) by mouth daily for 7 days, THEN 1 tablet (25 mg) daily.  - **Basic metabolic panel FUTURE anytime; Future    Recurrent major depressive disorder, in full remission (H)  PHQ-9 is 5.   Well controlled with effexor.  Discussed good self care, sleep hygiene, mindfulness therapies and meditation.    - EMOTIONAL / BEHAVIORAL ASSESSMENT    LIAT (generalized anxiety disorder)  LIAT 7 is 3.  Well controlled with effexor  - EMOTIONAL / BEHAVIORAL ASSESSMENT    35 minutes spent on the date of the encounter doing chart review, interpretation of tests, patient visit and documentation        BMI:   Estimated body mass index is 33.88 kg/m  as calculated from the following:    Height as of 2/18/21: 1.669 m (5' 5.7\").    Weight as of this encounter: 94.3 kg (208 lb).   Weight management plan: Discussed healthy diet and exercise guidelines        Return in about 2 weeks (around 4/15/2021) for Lab Work.    Alexandrea Mcqueen NP  [facility] Beaulieu is a 55 year old who presents for the following health issues     HPI     Hypertension Follow-up      Do you check your blood pressure regularly outside of the clinic? No     Are you following a low salt diet? No    Are your blood pressures ever more than 140 on the top number (systolic) OR more   than 90 on the bottom number (diastolic), for example 140/90? No NA not checking regularly      How many servings of fruits and vegetables do you eat daily?  2-3    On average, how many sweetened beverages do you drink each day (Examples: soda, juice, sweet tea, etc.  Do NOT count diet or artificially sweetened beverages)?   0    How many " days per week do you exercise enough to make your heart beat faster? 3 or less    How many minutes a day do you exercise enough to make your heart beat faster? 9 or less    How many days per week do you miss taking your medication? 0    Has not been taking her blood pressure.  Has ability to check at work. Her losartan was increased last visit.  Not watching her diet or exercising.  Has gained one pound since last visit.      Feels anxiety and depression are controlled.        Had her covid vaccines and has been over two weeks.        Review of Systems   Constitutional, HEENT, cardiovascular, pulmonary, gi and gu systems are negative, except as otherwise noted.      Objective    BP (!) 150/88   Pulse 93   Temp 97.9  F (36.6  C)   Resp 12   Wt 94.3 kg (208 lb)   LMP 07/10/2018   SpO2 98%   BMI 33.88 kg/m    Body mass index is 33.88 kg/m .  Physical Exam   GENERAL: healthy, alert and no distress  NECK: no adenopathy, no asymmetry, masses, or scars and thyroid normal to palpation  RESP: lungs clear to auscultation - no rales, rhonchi or wheezes  CV: regular rate and rhythm, normal S1 S2, no S3 or S4, no murmur, click or rub, no peripheral edema and peripheral pulses strong  ABDOMEN: soft, nontender, no hepatosplenomegaly, no masses and bowel sounds normal  MS: no gross musculoskeletal defects noted, no edema    Results for orders placed or performed in visit on 04/01/21 (from the past 24 hour(s))   **Basic metabolic panel FUTURE anytime   Result Value Ref Range    Sodium 141 133 - 144 mmol/L    Potassium 4.3 3.4 - 5.3 mmol/L    Chloride 106 94 - 109 mmol/L    Carbon Dioxide 30 20 - 32 mmol/L    Anion Gap 5 3 - 14 mmol/L    Glucose 98 70 - 99 mg/dL    Urea Nitrogen 12 7 - 30 mg/dL    Creatinine 0.52 0.52 - 1.04 mg/dL    GFR Estimate >90 >60 mL/min/[1.73_m2]    GFR Estimate If Black >90 >60 mL/min/[1.73_m2]    Calcium 8.8 8.5 - 10.1 mg/dL             Answers for HPI/ROS submitted by the patient on 4/1/2021   If  you checked off any problems, how difficult have these problems made it for you to do your work, take care of things at home, or get along with other people?: Not difficult at all  PHQ9 TOTAL SCORE: 5  LIAT 7 TOTAL SCORE: 3

## 2021-04-01 ENCOUNTER — OFFICE VISIT (OUTPATIENT)
Dept: FAMILY MEDICINE | Facility: CLINIC | Age: 56
End: 2021-04-01
Payer: COMMERCIAL

## 2021-04-01 VITALS
TEMPERATURE: 97.9 F | RESPIRATION RATE: 12 BRPM | WEIGHT: 208 LBS | DIASTOLIC BLOOD PRESSURE: 88 MMHG | SYSTOLIC BLOOD PRESSURE: 150 MMHG | BODY MASS INDEX: 33.88 KG/M2 | OXYGEN SATURATION: 98 % | HEART RATE: 93 BPM

## 2021-04-01 DIAGNOSIS — F41.1 GAD (GENERALIZED ANXIETY DISORDER): ICD-10-CM

## 2021-04-01 DIAGNOSIS — F33.42 RECURRENT MAJOR DEPRESSIVE DISORDER, IN FULL REMISSION (H): ICD-10-CM

## 2021-04-01 DIAGNOSIS — I10 ESSENTIAL HYPERTENSION: Primary | ICD-10-CM

## 2021-04-01 DIAGNOSIS — I10 ESSENTIAL HYPERTENSION: ICD-10-CM

## 2021-04-01 LAB
ANION GAP SERPL CALCULATED.3IONS-SCNC: 5 MMOL/L (ref 3–14)
BUN SERPL-MCNC: 12 MG/DL (ref 7–30)
CALCIUM SERPL-MCNC: 8.8 MG/DL (ref 8.5–10.1)
CHLORIDE SERPL-SCNC: 106 MMOL/L (ref 94–109)
CO2 SERPL-SCNC: 30 MMOL/L (ref 20–32)
CREAT SERPL-MCNC: 0.52 MG/DL (ref 0.52–1.04)
GFR SERPL CREATININE-BSD FRML MDRD: >90 ML/MIN/{1.73_M2}
GLUCOSE SERPL-MCNC: 98 MG/DL (ref 70–99)
POTASSIUM SERPL-SCNC: 4.3 MMOL/L (ref 3.4–5.3)
SODIUM SERPL-SCNC: 141 MMOL/L (ref 133–144)

## 2021-04-01 PROCEDURE — 96127 BRIEF EMOTIONAL/BEHAV ASSMT: CPT | Performed by: NURSE PRACTITIONER

## 2021-04-01 PROCEDURE — 36415 COLL VENOUS BLD VENIPUNCTURE: CPT | Performed by: NURSE PRACTITIONER

## 2021-04-01 PROCEDURE — 99214 OFFICE O/P EST MOD 30 MIN: CPT | Performed by: NURSE PRACTITIONER

## 2021-04-01 PROCEDURE — 80048 BASIC METABOLIC PNL TOTAL CA: CPT | Performed by: NURSE PRACTITIONER

## 2021-04-01 RX ORDER — CHLORTHALIDONE 25 MG/1
TABLET ORAL
Qty: 64 TABLET | Refills: 0 | Status: SHIPPED | OUTPATIENT
Start: 2021-04-01 | End: 2021-06-14

## 2021-04-01 ASSESSMENT — PATIENT HEALTH QUESTIONNAIRE - PHQ9
SUM OF ALL RESPONSES TO PHQ QUESTIONS 1-9: 5
10. IF YOU CHECKED OFF ANY PROBLEMS, HOW DIFFICULT HAVE THESE PROBLEMS MADE IT FOR YOU TO DO YOUR WORK, TAKE CARE OF THINGS AT HOME, OR GET ALONG WITH OTHER PEOPLE: NOT DIFFICULT AT ALL
SUM OF ALL RESPONSES TO PHQ QUESTIONS 1-9: 5

## 2021-04-01 ASSESSMENT — ANXIETY QUESTIONNAIRES
GAD7 TOTAL SCORE: 3
7. FEELING AFRAID AS IF SOMETHING AWFUL MIGHT HAPPEN: NOT AT ALL
1. FEELING NERVOUS, ANXIOUS, OR ON EDGE: SEVERAL DAYS
7. FEELING AFRAID AS IF SOMETHING AWFUL MIGHT HAPPEN: NOT AT ALL
GAD7 TOTAL SCORE: 3
6. BECOMING EASILY ANNOYED OR IRRITABLE: SEVERAL DAYS
4. TROUBLE RELAXING: NOT AT ALL
5. BEING SO RESTLESS THAT IT IS HARD TO SIT STILL: NOT AT ALL
3. WORRYING TOO MUCH ABOUT DIFFERENT THINGS: SEVERAL DAYS
2. NOT BEING ABLE TO STOP OR CONTROL WORRYING: NOT AT ALL
GAD7 TOTAL SCORE: 3

## 2021-04-01 ASSESSMENT — PAIN SCALES - GENERAL: PAINLEVEL: NO PAIN (0)

## 2021-04-02 ASSESSMENT — PATIENT HEALTH QUESTIONNAIRE - PHQ9: SUM OF ALL RESPONSES TO PHQ QUESTIONS 1-9: 5

## 2021-04-02 ASSESSMENT — ANXIETY QUESTIONNAIRES: GAD7 TOTAL SCORE: 3

## 2021-05-27 DIAGNOSIS — I10 ESSENTIAL HYPERTENSION: ICD-10-CM

## 2021-05-27 RX ORDER — LOSARTAN POTASSIUM 50 MG/1
50 TABLET ORAL DAILY
Qty: 90 TABLET | Refills: 0 | Status: SHIPPED | OUTPATIENT
Start: 2021-05-27 | End: 2021-08-25

## 2021-05-27 NOTE — TELEPHONE ENCOUNTER
Routing refill request to provider for review/approval because:  Elevated BP on file.     Deidra Dubose Rn

## 2021-06-14 DIAGNOSIS — I10 ESSENTIAL HYPERTENSION: ICD-10-CM

## 2021-06-14 RX ORDER — CHLORTHALIDONE 25 MG/1
TABLET ORAL
Qty: 64 TABLET | Refills: 0 | Status: SHIPPED | OUTPATIENT
Start: 2021-06-14 | End: 2021-09-09

## 2021-06-14 NOTE — TELEPHONE ENCOUNTER
Routing refill request to provider for review/approval because:  Elevated PHQ 9 score.     Deidra Avendano RN

## 2021-06-17 DIAGNOSIS — I10 ESSENTIAL HYPERTENSION: ICD-10-CM

## 2021-06-17 RX ORDER — LOSARTAN POTASSIUM 50 MG/1
TABLET ORAL
Qty: 90 TABLET | Refills: 0 | OUTPATIENT
Start: 2021-06-17

## 2021-09-08 DIAGNOSIS — I10 ESSENTIAL HYPERTENSION: ICD-10-CM

## 2021-09-09 RX ORDER — CHLORTHALIDONE 25 MG/1
TABLET ORAL
Qty: 64 TABLET | Refills: 0 | Status: SHIPPED | OUTPATIENT
Start: 2021-09-09 | End: 2021-11-29

## 2021-09-27 DIAGNOSIS — I10 BENIGN ESSENTIAL HYPERTENSION: ICD-10-CM

## 2021-09-28 RX ORDER — LOSARTAN POTASSIUM 25 MG/1
25 TABLET ORAL DAILY
Qty: 30 TABLET | Refills: 1 | Status: SHIPPED | OUTPATIENT
Start: 2021-09-28 | End: 2021-12-16

## 2021-10-06 DIAGNOSIS — M17.12 PRIMARY OSTEOARTHRITIS OF LEFT KNEE: ICD-10-CM

## 2021-10-08 RX ORDER — ETODOLAC 400 MG
TABLET ORAL
Qty: 180 TABLET | Refills: 0 | Status: SHIPPED | OUTPATIENT
Start: 2021-10-08 | End: 2022-01-12

## 2021-10-18 DIAGNOSIS — F41.1 GAD (GENERALIZED ANXIETY DISORDER): ICD-10-CM

## 2021-10-21 NOTE — TELEPHONE ENCOUNTER
Pending Prescriptions:                       Disp   Refills    venlafaxine (EFFEXOR-XR) 75 MG 24 hr capsu*90 cap*0        Sig: TAKE 1 CAPSULE BY MOUTH EVERY DAY    Routing refill request to provider for review/approval because:  Labs out of range:    BP Readings from Last 3 Encounters:   04/01/21 (!) 150/88   02/18/21 (!) 134/90   04/01/19 130/80       A break in medication

## 2021-10-22 RX ORDER — VENLAFAXINE HYDROCHLORIDE 75 MG/1
CAPSULE, EXTENDED RELEASE ORAL
Qty: 90 CAPSULE | Refills: 0 | Status: SHIPPED | OUTPATIENT
Start: 2021-10-22 | End: 2022-02-07

## 2021-10-23 ENCOUNTER — HEALTH MAINTENANCE LETTER (OUTPATIENT)
Age: 56
End: 2021-10-23

## 2021-11-27 DIAGNOSIS — I10 ESSENTIAL HYPERTENSION: ICD-10-CM

## 2021-11-29 RX ORDER — CHLORTHALIDONE 25 MG/1
TABLET ORAL
Qty: 64 TABLET | Refills: 0 | Status: SHIPPED | OUTPATIENT
Start: 2021-11-29 | End: 2022-02-15

## 2021-12-13 DIAGNOSIS — I10 BENIGN ESSENTIAL HYPERTENSION: ICD-10-CM

## 2021-12-16 RX ORDER — LOSARTAN POTASSIUM 25 MG/1
25 TABLET ORAL DAILY
Qty: 30 TABLET | Refills: 0 | Status: SHIPPED | OUTPATIENT
Start: 2021-12-16 | End: 2022-01-20

## 2021-12-16 NOTE — TELEPHONE ENCOUNTER
Routing refill request to provider for review/approval because:  BP out of range      Shanice Delacruz,RN

## 2022-01-10 DIAGNOSIS — M17.12 PRIMARY OSTEOARTHRITIS OF LEFT KNEE: ICD-10-CM

## 2022-01-11 NOTE — TELEPHONE ENCOUNTER
lodine refill request  Routing refill request to provider for review/approval because:  BP Readings from Last 3 Encounters:   04/01/21 (!) 150/88   02/18/21 (!) 134/90   04/01/19 130/80     Heavenly Hall RN

## 2022-01-12 RX ORDER — ETODOLAC 400 MG
TABLET ORAL
Qty: 180 TABLET | Refills: 0 | Status: SHIPPED | OUTPATIENT
Start: 2022-01-12 | End: 2022-02-23

## 2022-01-18 DIAGNOSIS — I10 BENIGN ESSENTIAL HYPERTENSION: ICD-10-CM

## 2022-01-19 NOTE — TELEPHONE ENCOUNTER
Routing refill request to provider for review/approval because:  Elevated BP on file    Deidra Dubose RN

## 2022-01-20 RX ORDER — LOSARTAN POTASSIUM 25 MG/1
TABLET ORAL
Qty: 30 TABLET | Refills: 0 | Status: SHIPPED | OUTPATIENT
Start: 2022-01-20 | End: 2022-02-16

## 2022-01-22 DIAGNOSIS — I10 BENIGN ESSENTIAL HYPERTENSION: ICD-10-CM

## 2022-01-25 RX ORDER — LOSARTAN POTASSIUM 25 MG/1
TABLET ORAL
Qty: 30 TABLET | Refills: 0 | OUTPATIENT
Start: 2022-01-25

## 2022-01-25 NOTE — TELEPHONE ENCOUNTER
Duplicate, RX sent in 1/20/22  losartan (COZAAR) 25 MG tablet 30 tablet 0 1/20/2022  No   Sig: TAKE 1 TABLET BY MOUTH EVERY DAY   Sent to pharmacy as: Losartan Potassium 25 MG Oral Tablet (COZAAR)   Class: E-Prescribe   Order: [id number]   E-Prescribing Status: Receipt confirmed by pharmacy (1/20/2022  8:45 AM CST)

## 2022-02-03 DIAGNOSIS — R11.0 NAUSEA: ICD-10-CM

## 2022-02-03 RX ORDER — ONDANSETRON 4 MG/1
4 TABLET, ORALLY DISINTEGRATING ORAL EVERY 8 HOURS PRN
Qty: 30 TABLET | Refills: 1 | Status: SHIPPED | OUTPATIENT
Start: 2022-02-03

## 2022-02-05 DIAGNOSIS — F41.1 GAD (GENERALIZED ANXIETY DISORDER): ICD-10-CM

## 2022-02-07 RX ORDER — VENLAFAXINE HYDROCHLORIDE 75 MG/1
CAPSULE, EXTENDED RELEASE ORAL
Qty: 90 CAPSULE | Refills: 0 | Status: SHIPPED | OUTPATIENT
Start: 2022-02-07 | End: 2022-04-26

## 2022-02-11 DIAGNOSIS — M17.12 PRIMARY OSTEOARTHRITIS OF LEFT KNEE: ICD-10-CM

## 2022-02-11 DIAGNOSIS — I10 ESSENTIAL HYPERTENSION: ICD-10-CM

## 2022-02-12 ENCOUNTER — HEALTH MAINTENANCE LETTER (OUTPATIENT)
Age: 57
End: 2022-02-12

## 2022-02-15 RX ORDER — ETODOLAC 400 MG
TABLET ORAL
Qty: 180 TABLET | Refills: 0 | OUTPATIENT
Start: 2022-02-15

## 2022-02-15 RX ORDER — CHLORTHALIDONE 25 MG/1
TABLET ORAL
Qty: 64 TABLET | Refills: 0 | Status: SHIPPED | OUTPATIENT
Start: 2022-02-15 | End: 2022-04-14

## 2022-02-15 NOTE — TELEPHONE ENCOUNTER
Lodine  Routing refill request to provider for review/approval because:  Labs not current:  ALT, AST, CBC  BP failed RN protocol    Hygroton  Routing refill request to provider for review/approval because:  BP failed RN protocol    Nancy Badillo RN

## 2022-02-16 DIAGNOSIS — I10 BENIGN ESSENTIAL HYPERTENSION: ICD-10-CM

## 2022-02-16 RX ORDER — LOSARTAN POTASSIUM 25 MG/1
25 TABLET ORAL DAILY
Qty: 60 TABLET | Refills: 0 | Status: SHIPPED | OUTPATIENT
Start: 2022-02-16 | End: 2022-04-26

## 2022-02-22 ENCOUNTER — NURSE TRIAGE (OUTPATIENT)
Dept: NURSING | Facility: CLINIC | Age: 57
End: 2022-02-22
Payer: COMMERCIAL

## 2022-02-22 DIAGNOSIS — M17.12 PRIMARY OSTEOARTHRITIS OF LEFT KNEE: ICD-10-CM

## 2022-02-22 NOTE — TELEPHONE ENCOUNTER
"Nurse SBAR    Situation: Patient calls with a request for refill of Etodolac.     Background: The patient states she received 60 tablets from [facility], in January. She requested a refill today and was told the Rx was \"denied.\"  Patient reports she has 2 tablets left for this evening and tomorrow morning.     Recommendation: Patient was advised that a message with an Rx request will be sent to Alexandrea Mcqueen NP, Care Team to address.    A separate refill encounter has been sent to Alexandrea Mcqueen NP, Care Team to address.      Sandra Anthony RN on 2/22/2022 at 2:45 PM    Reason for Disposition    [1] Caller requesting NON-URGENT health information AND [2] PCP's office is the best resource    Protocols used: INFORMATION ONLY CALL - NO TRIAGE-A-      "

## 2022-02-22 NOTE — TELEPHONE ENCOUNTER
"Situation: Patient calls with a request for refill of Etodolac.     Background: The patient states she received 60 tablets from [facility] in January. She requested a refill today and was told the Rx was \"denied.\"    Patient reports she has 2 tablets left for this evening and tomorrow morning.     02/11/22 1539 Reorder Alexandrea Mcqueen NP To Order:[id number]       Outpatient Medication Detail     Disp Refills Start End TEE   etodolac (LODINE) 400 MG tablet 180 tablet 0 1/12/2022  No   Sig: TAKE 1 TABLET BY MOUTH TWICE A DAY   Sent to pharmacy as: Etodolac 400 MG Oral Tablet (LODINE)   Class: E-Prescribe   Order: [id number]   E-Prescribing Status: Receipt confirmed by pharmacy (1/12/2022 10:28 AM CST)              Recommendation: Patient was advised that a message with an Rx request will be sent to Alexandrea Mcqueen NP, Care Team to address. Pharmacy has been pended.         Sandra Anthony RN on 2/22/2022 at 2:45 PM      "

## 2022-02-23 RX ORDER — ETODOLAC 400 MG
TABLET ORAL
Qty: 180 TABLET | Refills: 0 | Status: SHIPPED | OUTPATIENT
Start: 2022-02-23 | End: 2022-04-29

## 2022-04-23 DIAGNOSIS — I10 BENIGN ESSENTIAL HYPERTENSION: ICD-10-CM

## 2022-04-23 DIAGNOSIS — F41.1 GAD (GENERALIZED ANXIETY DISORDER): ICD-10-CM

## 2022-04-23 NOTE — LETTER
[address] [address]  Phone: [phone]  Fax: [phone]        April 29, 2022      Brittnee Herron                                                                                                                                [address] [address] [place] [address]            Dear Ms. Herron,    We are concerned about your health care.  We recently provided you with a medication refill.  Many medications require routine follow-up with your Doctor.      At this time we ask that: You schedule a routine office visit with your physician to follow your medications. Please call the clinic at [phone] option 1 to schedule.     Your prescription:( Losartan. Effexor)  Has been refilled for 1 time by Alexandrea Mcqueen so you may have time for the above noted follow-up.      Thank you,      Alexandrea Mcqueen NP  Care Team

## 2022-04-26 RX ORDER — LOSARTAN POTASSIUM 25 MG/1
TABLET ORAL
Qty: 60 TABLET | Refills: 0 | Status: SHIPPED | OUTPATIENT
Start: 2022-04-26 | End: 2022-07-01

## 2022-04-26 RX ORDER — VENLAFAXINE HYDROCHLORIDE 75 MG/1
CAPSULE, EXTENDED RELEASE ORAL
Qty: 90 CAPSULE | Refills: 0 | Status: SHIPPED | OUTPATIENT
Start: 2022-04-26 | End: 2022-07-05

## 2022-04-26 NOTE — TELEPHONE ENCOUNTER
Cozaar  Effexor  Routing refill request to provider for review/approval because:  Labs not current:  CR, Potassium  Patient needs to be seen because it has been more than 1 year since last office visit.  BP failed RN protocol    Nancy Badillo RN

## 2022-04-29 DIAGNOSIS — M17.12 PRIMARY OSTEOARTHRITIS OF LEFT KNEE: ICD-10-CM

## 2022-04-29 RX ORDER — ETODOLAC 400 MG
TABLET ORAL
Qty: 180 TABLET | Refills: 0 | Status: SHIPPED | OUTPATIENT
Start: 2022-04-29 | End: 2022-07-18

## 2022-04-29 NOTE — TELEPHONE ENCOUNTER
Pending Prescriptions:                       Disp   Refills    etodolac (LODINE) 400 MG tablet [Pharmacy *180 ta*0        Sig: TAKE 1 TABLET BY MOUTH TWICE A DAY      Routing refill request to provider for review/approval because:  Dasha given x1 and patient did not follow up, please advise    Keturah Fink, RN

## 2022-06-27 DIAGNOSIS — I10 ESSENTIAL HYPERTENSION: ICD-10-CM

## 2022-06-29 NOTE — TELEPHONE ENCOUNTER
Routing refill request to provider for review/approval because:  Labs not current:  CREA, K+, NA+  Patient needs to be seen because it has been more than 1 year since last office visit.  BP elevated    Poncho Streeter RN

## 2022-06-30 DIAGNOSIS — I10 BENIGN ESSENTIAL HYPERTENSION: ICD-10-CM

## 2022-06-30 RX ORDER — CHLORTHALIDONE 25 MG/1
TABLET ORAL
Qty: 64 TABLET | Refills: 0 | Status: SHIPPED | OUTPATIENT
Start: 2022-06-30 | End: 2022-09-19

## 2022-07-01 DIAGNOSIS — F41.1 GAD (GENERALIZED ANXIETY DISORDER): ICD-10-CM

## 2022-07-01 RX ORDER — LOSARTAN POTASSIUM 25 MG/1
TABLET ORAL
Qty: 30 TABLET | Refills: 0 | Status: SHIPPED | OUTPATIENT
Start: 2022-07-01 | End: 2022-07-27

## 2022-07-01 NOTE — TELEPHONE ENCOUNTER
Pending Prescriptions:                       Disp   Refills    venlafaxine (EFFEXOR XR) 75 MG 24 hr capsu*90 cap*0        Sig: TAKE 1 CAPSULE BY MOUTH EVERY DAY    Routing refill request to provider for review/approval because:  Labs not current:    BP Readings from Last 3 Encounters:   04/01/21 (!) 150/88   02/18/21 (!) 134/90   04/01/19 130/80     Creatinine   Date Value Ref Range Status   04/01/2021 0.52 0.52 - 1.04 mg/dL Final     Patient needs to be seen because it has been more than 1 year since last office visit.     Brittnee Gonzalez, SARAIN, RN

## 2022-07-01 NOTE — TELEPHONE ENCOUNTER
Losartan  Routing refill request to provider for review/approval because:  Labs not current:  CR, Potassium  Patient needs to be seen because it has been more than 1 year since last office visit.  BP failed RN protocol    Sending to scheduling for yearly office visit due    Nancy Badillo RN

## 2022-07-05 ENCOUNTER — MYC MEDICAL ADVICE (OUTPATIENT)
Dept: FAMILY MEDICINE | Facility: CLINIC | Age: 57
End: 2022-07-05

## 2022-07-05 RX ORDER — VENLAFAXINE HYDROCHLORIDE 75 MG/1
CAPSULE, EXTENDED RELEASE ORAL
Qty: 90 CAPSULE | Refills: 0 | Status: SHIPPED | OUTPATIENT
Start: 2022-07-05 | End: 2022-09-28

## 2022-07-05 NOTE — TELEPHONE ENCOUNTER
Patient informed via mychart to schedule. 7/8 reminder if not read to send letter.   Yasmin Mahan MA

## 2022-07-05 NOTE — LETTER
[address] [address]  Phone: [phone]  Fax: [phone]    July 8, 2022      Brittneesally Herron                                                                                                                                [address] [address] [place] [address]      Dear Ms. Herron,    We are concerned about your health care.  We recently provided you with a medication refill.  Many medications require routine follow-up with your Doctor.       At this time we ask that: You schedule an appointment for your annual physical. Call the clinic at [phone] Option 1 to schedule.      Your prescription:  Has been refilled for 1 month so you may have time for the above noted follow-up.        Thank you,     Alexandrea Mcqueen NP  Care Team

## 2022-07-14 DIAGNOSIS — M17.12 PRIMARY OSTEOARTHRITIS OF LEFT KNEE: ICD-10-CM

## 2022-07-18 RX ORDER — ETODOLAC 400 MG
TABLET ORAL
Qty: 180 TABLET | Refills: 0 | Status: SHIPPED | OUTPATIENT
Start: 2022-07-18 | End: 2022-10-31

## 2022-07-18 NOTE — TELEPHONE ENCOUNTER
Routing refill request to provider for review/approval because:  Labs not current:  ALT, AST, CBC, CRE  Patient needs to be seen because it has been more than 1 year since last office visit.  BP elevated      SARAI HernandezN, RN

## 2022-07-27 ENCOUNTER — MYC MEDICAL ADVICE (OUTPATIENT)
Dept: FAMILY MEDICINE | Facility: CLINIC | Age: 57
End: 2022-07-27

## 2022-07-27 DIAGNOSIS — I10 BENIGN ESSENTIAL HYPERTENSION: ICD-10-CM

## 2022-07-27 RX ORDER — LOSARTAN POTASSIUM 25 MG/1
TABLET ORAL
Qty: 30 TABLET | Refills: 0 | Status: SHIPPED | OUTPATIENT
Start: 2022-07-27 | End: 2022-08-25

## 2022-07-27 NOTE — LETTER
[address] [id number] [phone]        August 9, 2022    Brittnee Herron  [address] [address] [place] [address]              Keith Beaulieu,     We are concerned about your health care.  We recently provided you with a medication refill.  Many medications require routine follow-up with your Doctor.       At this time we ask that: You schedule a routine office visit with your physician to follow your blood pressure. Call the clinic at [phone] Option 1 to schedule.      Your prescription:  Has been refilled for 1 month so you may have time for the above noted follow-up.        Thank you,     Alexandrea Mcqueen's Care Team

## 2022-07-27 NOTE — TELEPHONE ENCOUNTER
"   Requested Prescriptions   Pending Prescriptions Disp Refills    losartan (COZAAR) 25 MG tablet [Pharmacy Med Name: LOSARTAN 25MG TABLET] 30 tablet 0     Sig: TAKE 1 TABLET BY MOUTH EVERY DAY        Angiotensin-II Receptors Failed - 7/27/2022  1:40 AM        Failed - Last blood pressure under 140/90 in past 12 months       BP Readings from Last 3 Encounters:   04/01/21 (!) 150/88   02/18/21 (!) 134/90   04/01/19 130/80                 Failed - Recent (12 mo) or future (30 days) visit within the authorizing provider's specialty     Patient has had an office visit with the authorizing provider or a provider within the authorizing providers department within the previous 12 mos or has a future within next 30 days. See \"Patient Info\" tab in inbasket, or \"Choose Columns\" in Meds & Orders section of the refill encounter.              Failed - Normal serum creatinine on file in past 12 months     Recent Labs   Lab Test 04/01/21  1001   CR 0.52       Ok to refill medication if creatinine is low          Failed - Normal serum potassium on file in past 12 months       Recent Labs   Lab Test 04/01/21  1001   POTASSIUM 4.3                    Passed - Medication is active on med list        Passed - Patient is age 18 or older        Passed - No active pregnancy on record        Passed - No positive pregnancy test in past 12 months              SARAI BoudreauxN, RN     "

## 2022-07-27 NOTE — TELEPHONE ENCOUNTER
I have sent the pt a [employer] message with the message below. Maria Del Rosario Howard, CMA

## 2022-08-03 DIAGNOSIS — M17.12 PRIMARY OSTEOARTHRITIS OF LEFT KNEE: ICD-10-CM

## 2022-08-03 RX ORDER — ETODOLAC 400 MG
TABLET ORAL
Qty: 180 TABLET | Refills: 0 | OUTPATIENT
Start: 2022-08-03

## 2022-08-03 NOTE — TELEPHONE ENCOUNTER
Prescription was sent 7/18/22 for #180 with 0 refills.  Pharmacy notified via E-Prescribe refusal.    SARAI BoudreauxN, RN

## 2022-08-09 NOTE — TELEPHONE ENCOUNTER
I have written and printed a letter to the pt with the information below. Maria Del Rosario Howard CMA ([facility])

## 2022-08-21 DIAGNOSIS — I10 BENIGN ESSENTIAL HYPERTENSION: ICD-10-CM

## 2022-08-23 NOTE — TELEPHONE ENCOUNTER
"Requested Prescriptions   Pending Prescriptions Disp Refills    losartan (COZAAR) 25 MG tablet [Pharmacy Med Name: LOSARTAN 25MG TABLET] 30 tablet 0     Sig: TAKE 1 TABLET BY MOUTH EVERY DAY        Angiotensin-II Receptors Failed - 8/21/2022  9:42 AM        Failed - Last blood pressure under 140/90 in past 12 months       BP Readings from Last 3 Encounters:   04/01/21 (!) 150/88   02/18/21 (!) 134/90   04/01/19 130/80                 Failed - Recent (12 mo) or future (30 days) visit within the authorizing provider's specialty     Patient has had an office visit with the authorizing provider or a provider within the authorizing providers department within the previous 12 mos or has a future within next 30 days. See \"Patient Info\" tab in inbasket, or \"Choose Columns\" in Meds & Orders section of the refill encounter.              Failed - Normal serum creatinine on file in past 12 months     Recent Labs   Lab Test 04/01/21  1001   CR 0.52       Ok to refill medication if creatinine is low          Failed - Normal serum potassium on file in past 12 months       Recent Labs   Lab Test 04/01/21  1001   POTASSIUM 4.3                    Passed - Medication is active on med list        Passed - Patient is age 18 or older        Passed - No active pregnancy on record        Passed - No positive pregnancy test in past 12 months              SARAI BoudreauxN, RN     "

## 2022-08-25 RX ORDER — LOSARTAN POTASSIUM 25 MG/1
25 TABLET ORAL DAILY
Qty: 30 TABLET | Refills: 0 | Status: SHIPPED | OUTPATIENT
Start: 2022-08-25 | End: 2022-09-14

## 2022-08-25 NOTE — TELEPHONE ENCOUNTER
Patient overdue for visit- has had mychart and letter- please call to schedule. Alexandrea Mcqueen, CNP

## 2022-09-12 DIAGNOSIS — I10 BENIGN ESSENTIAL HYPERTENSION: ICD-10-CM

## 2022-09-13 DIAGNOSIS — I10 ESSENTIAL HYPERTENSION: ICD-10-CM

## 2022-09-14 RX ORDER — LOSARTAN POTASSIUM 25 MG/1
25 TABLET ORAL DAILY
Qty: 30 TABLET | Refills: 0 | Status: SHIPPED | OUTPATIENT
Start: 2022-09-14 | End: 2022-11-01

## 2022-09-14 NOTE — TELEPHONE ENCOUNTER
"Requested Prescriptions   Pending Prescriptions Disp Refills    losartan (COZAAR) 25 MG tablet [Pharmacy Med Name: LOSARTAN 25MG TABLET] 30 tablet 0     Sig: Take 1 tablet (25 mg) by mouth daily Overdue for visit        Angiotensin-II Receptors Failed - 9/12/2022  9:09 AM        Failed - Last blood pressure under 140/90 in past 12 months       BP Readings from Last 3 Encounters:   04/01/21 (!) 150/88   02/18/21 (!) 134/90   04/01/19 130/80                 Failed - Recent (12 mo) or future (30 days) visit within the authorizing provider's specialty     Patient has had an office visit with the authorizing provider or a provider within the authorizing providers department within the previous 12 mos or has a future within next 30 days. See \"Patient Info\" tab in inbasket, or \"Choose Columns\" in Meds & Orders section of the refill encounter.              Failed - Normal serum creatinine on file in past 12 months     Recent Labs   Lab Test 04/01/21  1001   CR 0.52       Ok to refill medication if creatinine is low          Failed - Normal serum potassium on file in past 12 months       Recent Labs   Lab Test 04/01/21  1001   POTASSIUM 4.3                    Passed - Medication is active on med list        Passed - Patient is age 18 or older        Passed - No active pregnancy on record        Passed - No positive pregnancy test in past 12 months                STEPHANIE Sahu, RN          "

## 2022-09-15 NOTE — TELEPHONE ENCOUNTER
"Requested Prescriptions   Pending Prescriptions Disp Refills    chlorthalidone (HYGROTON) 25 MG tablet [Pharmacy Med Name: CHLORTHALIDONE 25MG TABLET] 64 tablet 0     Sig: TAKE 1 TABLET BY MOUTH EVERY DAY        Diuretics (Including Combos) Protocol Failed - 9/13/2022  8:32 AM        Failed - Blood pressure under 140/90 in past 12 months       BP Readings from Last 3 Encounters:   04/01/21 (!) 150/88   02/18/21 (!) 134/90   04/01/19 130/80                 Failed - Recent (12 mo) or future (30 days) visit within the authorizing provider's specialty     Patient has had an office visit with the authorizing provider or a provider within the authorizing providers department within the previous 12 mos or has a future within next 30 days. See \"Patient Info\" tab in inbasket, or \"Choose Columns\" in Meds & Orders section of the refill encounter.              Failed - Normal serum creatinine on file in past 12 months       Recent Labs   Lab Test 04/01/21  1001   CR 0.52              Failed - Normal serum potassium on file in past 12 months       Recent Labs   Lab Test 04/01/21  1001   POTASSIUM 4.3                    Failed - Normal serum sodium on file in past 12 months       Recent Labs   Lab Test 04/01/21  1001                 Passed - Medication is active on med list        Passed - Patient is age 18 or older        Passed - No active pregancy on record        Passed - No positive pregnancy test in past 12 months                STEPHANIE Sahu, RN          "

## 2022-09-19 RX ORDER — CHLORTHALIDONE 25 MG/1
TABLET ORAL
Qty: 64 TABLET | Refills: 0 | Status: SHIPPED | OUTPATIENT
Start: 2022-09-19 | End: 2022-10-06

## 2022-09-26 DIAGNOSIS — F41.1 GAD (GENERALIZED ANXIETY DISORDER): ICD-10-CM

## 2022-09-28 RX ORDER — VENLAFAXINE HYDROCHLORIDE 75 MG/1
CAPSULE, EXTENDED RELEASE ORAL
Qty: 90 CAPSULE | Refills: 0 | Status: SHIPPED | OUTPATIENT
Start: 2022-09-28 | End: 2022-11-03

## 2022-09-28 NOTE — TELEPHONE ENCOUNTER
Patient enrolled in our Rx Med Sync service to improve adherence. We are requesting a refill authorization in advance to ensure an active prescription is on file. (from Pharmacy).    NEXT appointment 11/1/22 Dr. Mcqueen.  Sent message to pharmacy to Please advise patient to complete appointment with PCP for further refills.      BP Readings from Last 3 Encounters:   04/01/21 (!) 150/88   02/18/21 (!) 134/90   04/01/19 130/80     Creatinine   Date Value Ref Range Status   04/01/2021 0.52 0.52 - 1.04 mg/dL Final     PHQ-9 score:    PHQ 4/1/2021   PHQ-9 Total Score 5   Q9: Thoughts of better off dead/self-harm past 2 weeks Not at all       Not RN protocol.    Please refill as appropriate.  Thank you,    Mita Vivar RN  [facility] [place]

## 2022-10-05 DIAGNOSIS — I10 ESSENTIAL HYPERTENSION: ICD-10-CM

## 2022-10-06 RX ORDER — CHLORTHALIDONE 25 MG/1
TABLET ORAL
Qty: 64 TABLET | Refills: 0 | Status: SHIPPED | OUTPATIENT
Start: 2022-10-06 | End: 2022-12-01

## 2022-10-06 NOTE — TELEPHONE ENCOUNTER
Routing refill request to provider for review/approval because:  Labs not current:  Cr, K, Na.   Blood pressure check.       Rosalinda Deshpande RN, BSN

## 2022-10-09 ENCOUNTER — HEALTH MAINTENANCE LETTER (OUTPATIENT)
Age: 57
End: 2022-10-09

## 2022-10-27 DIAGNOSIS — I10 BENIGN ESSENTIAL HYPERTENSION: ICD-10-CM

## 2022-10-31 DIAGNOSIS — M17.12 PRIMARY OSTEOARTHRITIS OF LEFT KNEE: ICD-10-CM

## 2022-10-31 RX ORDER — ETODOLAC 400 MG
400 TABLET ORAL 2 TIMES DAILY
Qty: 180 TABLET | Refills: 0 | Status: SHIPPED | OUTPATIENT
Start: 2022-10-31 | End: 2023-01-05

## 2022-11-01 ENCOUNTER — HOSPITAL ENCOUNTER (OUTPATIENT)
Dept: MAMMOGRAPHY | Facility: CLINIC | Age: 57
Discharge: HOME | End: 2022-11-01
Attending: NURSE PRACTITIONER | Admitting: NURSE PRACTITIONER
Payer: COMMERCIAL

## 2022-11-01 DIAGNOSIS — Z12.31 VISIT FOR SCREENING MAMMOGRAM: ICD-10-CM

## 2022-11-01 DIAGNOSIS — F41.1 GAD (GENERALIZED ANXIETY DISORDER): ICD-10-CM

## 2022-11-01 PROCEDURE — 77067 SCR MAMMO BI INCL CAD: CPT

## 2022-11-01 RX ORDER — LOSARTAN POTASSIUM 25 MG/1
25 TABLET ORAL DAILY
Qty: 30 TABLET | Refills: 0 | Status: SHIPPED | OUTPATIENT
Start: 2022-11-01 | End: 2022-11-15

## 2022-11-01 NOTE — TELEPHONE ENCOUNTER
"Requested Prescriptions   Pending Prescriptions Disp Refills    losartan (COZAAR) 25 MG tablet [Pharmacy Med Name: LOSARTAN 25MG TABLET] 30 tablet 0     Sig: TAKE 1 TABLET (25 MG) BY MOUTH DAILY. OVERDUE FOR VISIT       Angiotensin-II Receptors Failed - 10/27/2022  1:14 AM        Failed - Last blood pressure under 140/90 in past 12 months     BP Readings from Last 3 Encounters:   04/01/21 (!) 150/88   02/18/21 (!) 134/90   04/01/19 130/80                 Failed - Normal serum creatinine on file in past 12 months     Recent Labs   Lab Test 04/01/21  1001   CR 0.52       Ok to refill medication if creatinine is low          Failed - Normal serum potassium on file in past 12 months     Recent Labs   Lab Test 04/01/21  1001   POTASSIUM 4.3                    Passed - Recent (12 mo) or future (30 days) visit within the authorizing provider's specialty     Patient has had an office visit with the authorizing provider or a provider within the authorizing providers department within the previous 12 mos or has a future within next 30 days. See \"Patient Info\" tab in inbasket, or \"Choose Columns\" in Meds & Orders section of the refill encounter.              Passed - Medication is active on med list        Passed - Patient is age 18 or older        Passed - No active pregnancy on record        Passed - No positive pregnancy test in past 12 months               STEPHANIE Sahu, RN          "

## 2022-11-03 RX ORDER — VENLAFAXINE HYDROCHLORIDE 75 MG/1
CAPSULE, EXTENDED RELEASE ORAL
Qty: 90 CAPSULE | Refills: 0 | Status: SHIPPED | OUTPATIENT
Start: 2022-11-03 | End: 2023-01-05

## 2022-11-03 NOTE — TELEPHONE ENCOUNTER
"Routing refill request to provider for review/approval because:  Labs due:  CR  Requested Prescriptions   Pending Prescriptions Disp Refills    venlafaxine (EFFEXOR XR) 75 MG 24 hr capsule [Pharmacy Med Name: VENLAFAXINE 75MG ER CAPSULE] 90 capsule 0     Sig: TAKE 1 CAPSULE BY MOUTH ONCE DAILY       Serotonin-Norepinephrine Reuptake Inhibitors  Failed - 11/1/2022  1:19 AM        Failed - Blood pressure under 140/90 in past 12 months     BP Readings from Last 3 Encounters:   04/01/21 (!) 150/88   02/18/21 (!) 134/90   04/01/19 130/80           Failed - Recent (12 mo) or future (30 days) visit within the authorizing provider's specialty     Patient has had an office visit with the authorizing provider or a provider within the authorizing providers department within the previous 12 mos or has a future within next 30 days. See \"Patient Info\" tab in inbasket, or \"Choose Columns\" in Meds & Orders section of the refill encounter.           Future Appointments 11/3/2022 - 5/2/2023        Date Visit Type Length Department Provider     1/5/2023  1:00 PM PREVENTATIVE ADULT            30 min [facility] Alexandrea Mcqueen, JUJU    Location Instructions:     [facility] is located at [address], within [facility]. This is near the [address] exit off of [address]. Turn north off the exit, then west onto [address]. Park in the Blue Lot. The [facility] and medical center share a main entrance and .                      "

## 2022-11-13 DIAGNOSIS — I10 BENIGN ESSENTIAL HYPERTENSION: ICD-10-CM

## 2022-11-15 RX ORDER — LOSARTAN POTASSIUM 25 MG/1
25 TABLET ORAL DAILY
Qty: 30 TABLET | Refills: 0 | Status: SHIPPED | OUTPATIENT
Start: 2022-11-15 | End: 2022-12-15

## 2022-11-15 NOTE — TELEPHONE ENCOUNTER
Prescription approved per [facility] Refill Protocol.  Appointment scheduled in Jan 2023.     Rosalinda Deshpande, RN, BSN

## 2022-11-18 DIAGNOSIS — F41.1 GAD (GENERALIZED ANXIETY DISORDER): ICD-10-CM

## 2022-11-22 RX ORDER — VENLAFAXINE HYDROCHLORIDE 75 MG/1
CAPSULE, EXTENDED RELEASE ORAL
Qty: 90 CAPSULE | Refills: 0 | OUTPATIENT
Start: 2022-11-22

## 2022-11-28 DIAGNOSIS — I10 ESSENTIAL HYPERTENSION: ICD-10-CM

## 2022-12-01 RX ORDER — CHLORTHALIDONE 25 MG/1
TABLET ORAL
Qty: 64 TABLET | Refills: 0 | Status: SHIPPED | OUTPATIENT
Start: 2022-12-01 | End: 2023-01-05

## 2022-12-14 DIAGNOSIS — I10 BENIGN ESSENTIAL HYPERTENSION: ICD-10-CM

## 2022-12-15 RX ORDER — LOSARTAN POTASSIUM 25 MG/1
25 TABLET ORAL DAILY
Qty: 30 TABLET | Refills: 0 | Status: SHIPPED | OUTPATIENT
Start: 2022-12-15 | End: 2023-01-05

## 2022-12-15 NOTE — TELEPHONE ENCOUNTER
"Requested Prescriptions   Pending Prescriptions Disp Refills    losartan (COZAAR) 25 MG tablet [Pharmacy Med Name: LOSARTAN 25MG TABLET] 30 tablet 0     Sig: TAKE 1 TABLET (25 MG) BY MOUTH DAILY. OVERDUE FOR VISIT       Angiotensin-II Receptors Failed - 12/14/2022  1:31 AM        Failed - Last blood pressure under 140/90 in past 12 months     BP Readings from Last 3 Encounters:   04/01/21 (!) 150/88   02/18/21 (!) 134/90   04/01/19 130/80                 Failed - Normal serum creatinine on file in past 12 months     Recent Labs   Lab Test 04/01/21  1001   CR 0.52       Ok to refill medication if creatinine is low          Failed - Normal serum potassium on file in past 12 months     Recent Labs   Lab Test 04/01/21  1001   POTASSIUM 4.3                    Passed - Recent (12 mo) or future (30 days) visit within the authorizing provider's specialty     Patient has had an office visit with the authorizing provider or a provider within the authorizing providers department within the previous 12 mos or has a future within next 30 days. See \"Patient Info\" tab in inbasket, or \"Choose Columns\" in Meds & Orders section of the refill encounter.              Passed - Medication is active on med list        Passed - Patient is age 18 or older        Passed - No active pregnancy on record        Passed - No positive pregnancy test in past 12 months             "

## 2023-01-05 ENCOUNTER — OFFICE VISIT (OUTPATIENT)
Dept: FAMILY MEDICINE | Facility: CLINIC | Age: 58
End: 2023-01-05
Payer: COMMERCIAL

## 2023-01-05 VITALS
HEART RATE: 78 BPM | WEIGHT: 197.13 LBS | TEMPERATURE: 97.6 F | BODY MASS INDEX: 31.68 KG/M2 | RESPIRATION RATE: 18 BRPM | SYSTOLIC BLOOD PRESSURE: 120 MMHG | OXYGEN SATURATION: 96 % | DIASTOLIC BLOOD PRESSURE: 84 MMHG | HEIGHT: 66 IN

## 2023-01-05 DIAGNOSIS — Z12.11 SCREEN FOR COLON CANCER: ICD-10-CM

## 2023-01-05 DIAGNOSIS — F41.1 GAD (GENERALIZED ANXIETY DISORDER): ICD-10-CM

## 2023-01-05 DIAGNOSIS — M17.12 PRIMARY OSTEOARTHRITIS OF LEFT KNEE: ICD-10-CM

## 2023-01-05 DIAGNOSIS — Z13.220 LIPID SCREENING: ICD-10-CM

## 2023-01-05 DIAGNOSIS — I10 BENIGN ESSENTIAL HYPERTENSION: ICD-10-CM

## 2023-01-05 DIAGNOSIS — Z00.00 ROUTINE GENERAL MEDICAL EXAMINATION AT A HEALTH CARE FACILITY: Primary | ICD-10-CM

## 2023-01-05 DIAGNOSIS — F33.42 RECURRENT MAJOR DEPRESSIVE DISORDER, IN FULL REMISSION (H): ICD-10-CM

## 2023-01-05 PROCEDURE — 99214 OFFICE O/P EST MOD 30 MIN: CPT | Mod: 25 | Performed by: NURSE PRACTITIONER

## 2023-01-05 PROCEDURE — 99396 PREV VISIT EST AGE 40-64: CPT | Performed by: NURSE PRACTITIONER

## 2023-01-05 PROCEDURE — 96127 BRIEF EMOTIONAL/BEHAV ASSMT: CPT | Performed by: NURSE PRACTITIONER

## 2023-01-05 RX ORDER — LOSARTAN POTASSIUM 25 MG/1
25 TABLET ORAL DAILY
Qty: 30 TABLET | Refills: 0 | Status: SHIPPED | OUTPATIENT
Start: 2023-01-05 | End: 2023-01-24

## 2023-01-05 RX ORDER — CHLORTHALIDONE 25 MG/1
25 TABLET ORAL DAILY
Qty: 90 TABLET | Refills: 3 | Status: SHIPPED | OUTPATIENT
Start: 2023-01-05 | End: 2024-05-29

## 2023-01-05 RX ORDER — ETODOLAC 400 MG
400 TABLET ORAL 2 TIMES DAILY
Qty: 180 TABLET | Refills: 0 | Status: SHIPPED | OUTPATIENT
Start: 2023-01-05 | End: 2023-01-27

## 2023-01-05 RX ORDER — VENLAFAXINE HYDROCHLORIDE 150 MG/1
CAPSULE, EXTENDED RELEASE ORAL
Qty: 90 CAPSULE | Refills: 0 | Status: SHIPPED | OUTPATIENT
Start: 2023-01-05 | End: 2023-03-22

## 2023-01-05 ASSESSMENT — ENCOUNTER SYMPTOMS
HEMATURIA: 0
HEMATOCHEZIA: 0
DYSURIA: 0
SORE THROAT: 0
HEADACHES: 0
PALPITATIONS: 0
ARTHRALGIAS: 0
EYE PAIN: 0
SHORTNESS OF BREATH: 0
ABDOMINAL PAIN: 0
COUGH: 0
NAUSEA: 0
MYALGIAS: 0
FEVER: 0
CHILLS: 0
DIZZINESS: 0
JOINT SWELLING: 0
PARESTHESIAS: 0
HEARTBURN: 0
NERVOUS/ANXIOUS: 0
WEAKNESS: 0
FREQUENCY: 0
CONSTIPATION: 0
DIARRHEA: 0

## 2023-01-05 ASSESSMENT — PATIENT HEALTH QUESTIONNAIRE - PHQ9
10. IF YOU CHECKED OFF ANY PROBLEMS, HOW DIFFICULT HAVE THESE PROBLEMS MADE IT FOR YOU TO DO YOUR WORK, TAKE CARE OF THINGS AT HOME, OR GET ALONG WITH OTHER PEOPLE: NOT DIFFICULT AT ALL
SUM OF ALL RESPONSES TO PHQ QUESTIONS 1-9: 12
SUM OF ALL RESPONSES TO PHQ QUESTIONS 1-9: 12

## 2023-01-05 ASSESSMENT — PAIN SCALES - GENERAL: PAINLEVEL: NO PAIN (0)

## 2023-01-05 NOTE — PROGRESS NOTES
SUBJECTIVE:   CC: Brittnee is an 57 year old who presents for preventive health visit.     Patient has been advised of split billing requirements and indicates understanding: Yes  Healthy Habits:     Getting at least 3 servings of Calcium per day:  Yes    Bi-annual eye exam:  Yes    Dental care twice a year:  Yes    Sleep apnea or symptoms of sleep apnea:  None    Diet:  Regular (no restrictions)    Frequency of exercise:  2-3 days/week    Duration of exercise:  15-30 minutes    Taking medications regularly:  Yes    Medication side effects:  Not applicable and None    PHQ-2 Total Score: 4    Additional concerns today:  No          Hypertension Follow-up      Do you check your blood pressure regularly outside of the clinic? Yes     Are you following a low salt diet? Yes    Are your blood pressures ever more than 140 on the top number (systolic) OR more   than 90 on the bottom number (diastolic), for example 140/90? No    Depression and Anxiety Follow-Up    How are you doing with your depression since your last visit? Worsened more anxious this time of year    How are you doing with your anxiety since your last visit?  Worsened     Are you having other symptoms that might be associated with depression or anxiety? No    Have you had a significant life event? No     Do you have any concerns with your use of alcohol or other drugs? No    Social History     Tobacco Use     Smoking status: Never     Smokeless tobacco: Never   Vaping Use     Vaping Use: Never used   Substance Use Topics     Alcohol use: No     Drug use: No     PHQ 2/18/2021 4/1/2021 1/5/2023   PHQ-9 Total Score 4 5 12   Q9: Thoughts of better off dead/self-harm past 2 weeks Not at all Not at all Not at all     LIAT-7 SCORE 7/18/2018 2/18/2021 4/1/2021   Total Score - 3 (minimal anxiety) 3 (minimal anxiety)   Total Score 10 3 3     Last PHQ-9 1/5/2023   1.  Little interest or pleasure in doing things 2   2.  Feeling down, depressed, or hopeless 2   3.   Trouble falling or staying asleep, or sleeping too much 2   4.  Feeling tired or having little energy 2   5.  Poor appetite or overeating 2   6.  Feeling bad about yourself 2   7.  Trouble concentrating 0   8.  Moving slowly or restless 0   Q9: Thoughts of better off dead/self-harm past 2 weeks 0   PHQ-9 Total Score 12   Difficulty at work, home, or with people -     LIAT-7  4/1/2021   1. Feeling nervous, anxious, or on edge 1   2. Not being able to stop or control worrying 0   3. Worrying too much about different things 1   4. Trouble relaxing 0   5. Being so restless that it is hard to sit still 0   6. Becoming easily annoyed or irritable 1   7. Feeling afraid, as if something awful might happen 0   LIAT-7 Total Score 3   If you checked any problems, how difficult have they made it for you to do your work, take care of things at home, or get along with other people? -       Suicide Assessment Five-step Evaluation and Treatment (SAFE-T)      Today's PHQ-2 Score:   PHQ-2 ( 1999 [employer]) 1/5/2023   Q1: Little interest or pleasure in doing things 2   Q2: Feeling down, depressed or hopeless 2   PHQ-2 Score 4   PHQ-2 Total Score (12-17 Years)- Positive if 3 or more points; Administer PHQ-A if positive -   Q1: Little interest or pleasure in doing things More than half the days   Q2: Feeling down, depressed or hopeless More than half the days   PHQ-2 Score 4       Have you ever done Advance Care Planning? (For example, a Health Directive, POLST, or a discussion with a medical provider or your loved ones about your wishes): No, advance care planning information given to patient to review.  Patient plans to discuss their wishes with loved ones or provider.      Social History     Tobacco Use     Smoking status: Never     Smokeless tobacco: Never   Substance Use Topics     Alcohol use: No     If you drink alcohol do you typically have >3 drinks per day or >7 drinks per week? No    Alcohol Use 1/5/2023   Prescreen: >3 drinks/day  or >7 drinks/week? No   Prescreen: >3 drinks/day or >7 drinks/week? -   No flowsheet data found.    Reviewed orders with patient.  Reviewed health maintenance and updated orders accordingly - Yes  Lab work is in process    Breast Cancer Screening:    FHS-7:   Breast CA Risk Assessment (FHS-7) 11/1/2022 1/5/2023   Did any of your first-degree relatives have breast or ovarian cancer? Yes No   Did any of your relatives have bilateral breast cancer? No No   Did any man in your family have breast cancer? No No   Did any woman in your family have breast and ovarian cancer? No Yes   Did any woman in your family have breast cancer before age 50 y? No No   Do you have 2 or more relatives with breast and/or ovarian cancer? No No   Do you have 2 or more relatives with breast and/or bowel cancer? No No       Mammogram Screening: Recommended mammography every 1-2 years with patient discussion and risk factor consideration  Pertinent mammograms are reviewed under the imaging tab.    History of abnormal Pap smear: NO - age 30-65 PAP every 5 years with negative HPV co-testing recommended  PAP / HPV Latest Ref Rng & Units 7/18/2018 7/14/2015 9/28/2005   PAP (Historical) - NIL NIL NIL   HPV16 NEG:Negative Negative Negative -   HPV18 NEG:Negative Negative Negative -   HRHPV NEG:Negative Negative Negative -     Reviewed and updated as needed this visit by clinical staff   Tobacco  Allergies  Meds  Problems  Med Hx  Surg Hx  Fam Hx          Reviewed and updated as needed this visit by Provider   Tobacco  Allergies  Meds  Problems  Med Hx  Surg Hx  Fam Hx         Past Medical History:   Diagnosis Date     Depressive disorder, not elsewhere classified 1982    off meds for a year      Past Surgical History:   Procedure Laterality Date     ARTHROSCOPY KNEE WITH MEDIAL MENISCECTOMY Left 7/18/2017    Procedure: ARTHROSCOPY KNEE WITH MEDIAL MENISCECTOMY;  left knee arthroscopy with partial medial menisectomy;  Surgeon: Vikas  "MD Brittnee;  Location: [facility]     HC REMOVAL OF OVARIAN CYST(S)  1990    laparoscopic     HC REMOVE TONSILS/ADENOIDS,<13 Y/O       ZZC LIGATE FALLOPIAN TUBE,POSTPARTUM  08/03/2002    Postpartum bilateral tubal ligation with partial salpingectomy through a mini laparotomy       Review of Systems   Constitutional: Negative for chills and fever.   HENT: Negative for congestion, ear pain, hearing loss and sore throat.    Eyes: Negative for pain and visual disturbance.   Respiratory: Negative for cough and shortness of breath.    Cardiovascular: Negative for chest pain, palpitations and peripheral edema.   Gastrointestinal: Negative for abdominal pain, constipation, diarrhea, heartburn, hematochezia and nausea.   Genitourinary: Negative for dysuria, frequency, genital sores, hematuria and urgency.   Musculoskeletal: Negative for arthralgias, joint swelling and myalgias.   Skin: Negative for rash.   Neurological: Negative for dizziness, weakness, headaches and paresthesias.   Psychiatric/Behavioral: Negative for mood changes. The patient is not nervous/anxious.           OBJECTIVE:   /84 (BP Location: Left arm, Patient Position: Sitting, Cuff Size: Adult Regular)   Pulse 78   Temp 97.6  F (36.4  C) (Temporal)   Resp 18   Ht 1.684 m (5' 6.3\")   Wt 89.4 kg (197 lb 2 oz)   LMP 07/10/2018   SpO2 96%   BMI 31.53 kg/m    Physical Exam  GENERAL: healthy, alert and no distress  EYES: Eyes grossly normal to inspection, PERRL and conjunctivae and sclerae normal  HENT: ear canals and TM's normal, nose and mouth without ulcers or lesions  NECK: no adenopathy, no asymmetry, masses, or scars and thyroid normal to palpation  RESP: lungs clear to auscultation - no rales, rhonchi or wheezes  BREAST: declined recent mammo  CV: regular rate and rhythm, normal S1 S2, no S3 or S4, no murmur, click or rub, no peripheral edema and peripheral pulses strong  ABDOMEN: soft, nontender, no hepatosplenomegaly, no masses and bowel " sounds normal  MS: no gross musculoskeletal defects noted, no edema  SKIN: no suspicious lesions or rashes  NEURO: Normal strength and tone, mentation intact and speech normal  PSYCH: mentation appears normal, affect normal/bright    Diagnostic Test Results:  Labs reviewed in Epic  No results found for this or any previous visit (from the past 24 hour(s)).    ASSESSMENT/PLAN:   (Z00.00) Routine general medical examination at a health care facility  (primary encounter diagnosis)  Comment: recommend yearly physicals    (F33.42) Recurrent major depressive disorder, in full remission (H)  Comment: will increase her effexor today. Not suicidal.        (F41.1) LIAT (generalized anxiety disorder)  Commen: increase effexor as abovce  Plan: venlafaxine (EFFEXOR XR) 150 MG 24 hr capsule,         OK BEHAV ASSMT W/SCORE & DOCD/STAND INSTRUMENT           (I10) Benign essential hypertension  Comment: well controlled.  Check labs  Plan: losartan (COZAAR) 25 MG tablet    (M17.12) Primary osteoarthritis of left knee  Comment: uses sparingly  Plan: etodolac (LODINE) 400 MG tablet    (Z12.11) Screen for colon cancer  Comment: screen  Plan: Colonoscopy Screening  Referral    (Z13.220) Lipid screening  Comment: screen  Plan: Lipid panel reflex to direct LDL Fasting      Patient has been advised of split billing requirements and indicates understanding: Yes      COUNSELING:  Reviewed preventive health counseling, as reflected in patient instructions        She reports that she has never smoked. She has never used smokeless tobacco.      Alexandrea Mcqueen NP  [facility]  Answers for HPI/ROS submitted by the patient on 1/5/2023  If you checked off any problems, how difficult have these problems made it for you to do your work, take care of things at home, or get along with other people?: Not difficult at all  PHQ9 TOTAL SCORE: 12

## 2023-01-05 NOTE — PATIENT INSTRUCTIONS
Call to schedule labs- [phone]    Preventive Health Recommendations  Female Ages 50 - 64    Yearly exam: See your health care provider every year in order to  Review health changes.   Discuss preventive care.    Review your medicines if your doctor has prescribed any.    Get a Pap test every three years (unless you have an abnormal result and your provider advises testing more often).  If you get Pap tests with HPV test, you only need to test every 5 years, unless you have an abnormal result.   You do not need a Pap test if your uterus was removed (hysterectomy) and you have not had cancer.  You should be tested each year for STDs (sexually transmitted diseases) if you're at risk.   Have a mammogram every 1 to 2 years.  Have a colonoscopy at age 50, or have a yearly FIT test (stool test). These exams screen for colon cancer.    Have a cholesterol test every 5 years, or more often if advised.  Have a diabetes test (fasting glucose) every three years. If you are at risk for diabetes, you should have this test more often.   If you are at risk for osteoporosis (brittle bone disease), think about having a bone density scan (DEXA).    Shots: Get a flu shot each year. Get a tetanus shot every 10 years.    Nutrition:   Eat at least 5 servings of fruits and vegetables each day.  Eat whole-grain bread, whole-wheat pasta and brown rice instead of white grains and rice.  Get adequate Calcium and Vitamin D.     Lifestyle  Exercise at least 150 minutes a week (30 minutes a day, 5 days a week). This will help you control your weight and prevent disease.  Limit alcohol to one drink per day.  No smoking.   Wear sunscreen to prevent skin cancer.   See your dentist every six months for an exam and cleaning.  See your eye doctor every 1 to 2 years.

## 2023-01-11 ENCOUNTER — TELEPHONE (OUTPATIENT)
Dept: GASTROENTEROLOGY | Facility: CLINIC | Age: 58
End: 2023-01-11

## 2023-01-11 NOTE — TELEPHONE ENCOUNTER
"    Screening Questions  BLUE  KIND OF PREP RED  LOCATION [review exclusion criteria] GREEN  SEDATION TYPE        n Are you active on mychart?       Alexandrea Mcqueen  Ordering/Referring Provider?        [facility] What type of coverage do you have?      n Have you had a positive covid test in the last 14 days?     31.8 1. BMI  [BMI 40+ - review exclusion criteria]    y  2. Are you able to give consent for your medical care? [IF NO,RN REVIEW]          n  3. Are you taking any prescription pain medications on a routine schedule   (ex narcotics: tramadol, oxycodone, roxicodone, oxycontin,  and percocet)?          3a. EXTENDED PREP What kind of prescription?     n 4. Do you have any chemical dependencies such as alcohol, street drugs, or methadone?        **If yes 3- 5 , please schedule with MAC sedation.**          IF YES TO ANY 6 - 10 - HOSPITAL SETTING ONLY.     n 6.   Do you need assistance transferring?     n 7.   Have you had a heart or lung transplant?    n 8.   Are you currently on dialysis?   n 9.   Do you use daily home oxygen?   n 10. Do you take nitroglycerin?   10a.  If yes, how often?     11. [FEMALES]   Are you currently pregnant?    11a.  If yes, how many weeks? [ Greater than 12 weeks, OR NEEDED]    n 12. Do you have Pulmonary Hypertension? *NEED PAC APPT AT [facility]*     n 13. [review exclusion criteria]  Do you have any implantable devices in your body (pacemaker, defib, LVAD)?    n 14. In the past 6 months, have you had any heart related issues including cardiomyopathy or heart attack?     14a.  If yes, did it require cardiac stenting if so when?     n 15. Have you had a stroke or Transient ischemic attack (TIA - aka  mini stroke ) within 6 months?      n 16. Do you have mod to severe Obstructive Sleep Apnea?  [Hospital only]    n 17. Do you have SEVERE AND UNCONTROLLED asthma? *NEED PAC APPT AT [facility]*     n 18. Are you currently taking any blood thinners?     18a. If yes, inform patient to \"follow up w/ " "ordering provider for bridging instructions.\"    n 19. Do you take the medication Phentermine?    19a. If yes, \"Hold for 7 days before procedure.  Please consult your prescribing provider if you have questions about holding this medication.\"     n  20. Do you have chronic kidney disease?      n  21. Do you have a diagnosis of diabetes?     n  22. On a regular basis do you go 3-5 days between bowel movements?      23. Preferred LOCAL Pharmacy for Pre Prescription    [ LIST ONLY ONE PHARMACY]          [id number] - [address]        - CLOSING REMINDERS -    Informed patient they will need an adult    Cannot take any type of public or medical transportation alone    Conscious Sedation- Needs  for 6 hours after the procedure       MAC/General-Needs  for 24 hours after procedure    Pre-Procedure Covid test to be completed [[facility] PCR Testing Required]    Confirmed Nurse will call to complete assessment       - SCHEDULING DETAILS -  n Hospital Setting Required? If yes, what is the exclusion?:    long  Surgeon    03/07/2023  Date of Procedure  Lower Endoscopy [Colonoscopy]  Type of Procedure Scheduled  [facility]-If you answer yes to questions #8, #20, #21Which Colonoscopy Prep was Sent?     mac Sedation Type     n PAC / Pre-op Required               "

## 2023-01-20 DIAGNOSIS — I10 BENIGN ESSENTIAL HYPERTENSION: ICD-10-CM

## 2023-01-24 RX ORDER — LOSARTAN POTASSIUM 25 MG/1
25 TABLET ORAL DAILY
Qty: 30 TABLET | Refills: 0 | Status: SHIPPED | OUTPATIENT
Start: 2023-01-24 | End: 2023-02-03

## 2023-01-24 NOTE — TELEPHONE ENCOUNTER
Routing refill request to provider for review/approval because:    Requested Prescriptions   Pending Prescriptions Disp Refills    losartan (COZAAR) 25 MG tablet [Pharmacy Med Name: LOSARTAN 25MG TABLET] 30 tablet 0     Sig: Take 1 tablet (25 mg) by mouth daily Overdue for visit       Angiotensin-II Receptors Failed - 1/20/2023  1:20 AM        Failed - Normal serum creatinine on file in past 12 months     Recent Labs   Lab Test 04/01/21  1001   CR 0.52       Ok to refill medication if creatinine is low          Failed - Normal serum potassium on file in past 12 months     Recent Labs   Lab Test 04/01/21  1001   POTASSIUM 4.3

## 2023-02-02 DIAGNOSIS — I10 BENIGN ESSENTIAL HYPERTENSION: ICD-10-CM

## 2023-02-03 RX ORDER — LOSARTAN POTASSIUM 25 MG/1
25 TABLET ORAL DAILY
Qty: 30 TABLET | Refills: 0 | Status: SHIPPED | OUTPATIENT
Start: 2023-02-03 | End: 2023-03-20

## 2023-02-03 NOTE — TELEPHONE ENCOUNTER
Pending Prescriptions:                       Disp   Refills    losartan (COZAAR) 25 MG tablet [Pharmacy M*30 tab*0        Sig: TAKE 1 TABLET (25 MG) BY MOUTH DAILY OVERDUE FOR           VISIT      Routing refill request to provider for review/approval because:  Labs out of range:    Potassium   Date Value Ref Range Status   04/01/2021 4.3 3.4 - 5.3 mmol/L Final     Creatinine   Date Value Ref Range Status   04/01/2021 0.52 0.52 - 1.04 mg/dL Final         Keturah Fink RN

## 2023-02-17 ENCOUNTER — TELEPHONE (OUTPATIENT)
Dept: GASTROENTEROLOGY | Facility: CLINIC | Age: 58
End: 2023-02-17
Payer: COMMERCIAL

## 2023-02-17 NOTE — TELEPHONE ENCOUNTER
Caller: Brittnee Herron    Reason for Reschedule/Cancellation (please be detailed, any staff messages or encounters to note?): provider not available       Prior to reschedule please review:    Ordering Provider:Alexandrea Mcqueen NP     Sedation per order:mod    Does patient have any ASC Exclusions, please identify?: n      Notes on Cancelled Procedure:    Procedure:Lower Endoscopy [Colonoscopy]     Date: 03/07/2023    Location:[facility]; [address] , [place] [address]    Surgeon: long        Rescheduled: Yes    Procedure: Lower Endoscopy [Colonoscopy]     Date: 04/12/2023    Location:[facility]; [address] [place] [address]    Surgeon: long    Sedation Level Scheduled  MAC,  Reason for Sedation Level per location    Prep/Instructions updated and sent: alfie

## 2023-03-19 DIAGNOSIS — I10 BENIGN ESSENTIAL HYPERTENSION: ICD-10-CM

## 2023-03-20 RX ORDER — LOSARTAN POTASSIUM 25 MG/1
25 TABLET ORAL DAILY
Qty: 30 TABLET | Refills: 0 | Status: SHIPPED | OUTPATIENT
Start: 2023-03-20 | End: 2023-04-19

## 2023-03-20 NOTE — TELEPHONE ENCOUNTER
Routing refill request to provider for review/approval because:    Requested Prescriptions   Pending Prescriptions Disp Refills    losartan (COZAAR) 25 MG tablet [Pharmacy Med Name: LOSARTAN 25MG TABLET] 30 tablet 0     Sig: TAKE 1 TABLET (25 MG) BY MOUTH DAILY OVERDUE FOR VISIT       Angiotensin-II Receptors Failed - 3/19/2023  5:01 AM        Failed - Normal serum creatinine on file in past 12 months     Recent Labs   Lab Test 04/01/21  1001   CR 0.52       Ok to refill medication if creatinine is low          Failed - Normal serum potassium on file in past 12 months     Recent Labs   Lab Test 04/01/21  1001   POTASSIUM 4.3

## 2023-03-21 DIAGNOSIS — F41.1 GAD (GENERALIZED ANXIETY DISORDER): ICD-10-CM

## 2023-03-22 RX ORDER — VENLAFAXINE HYDROCHLORIDE 150 MG/1
CAPSULE, EXTENDED RELEASE ORAL
Qty: 90 CAPSULE | Refills: 0 | Status: SHIPPED | OUTPATIENT
Start: 2023-03-22 | End: 2023-05-02

## 2023-03-22 NOTE — TELEPHONE ENCOUNTER
Pending Prescriptions:                       Disp   Refills    venlafaxine (EFFEXOR XR) 150 MG 24 hr caps*90 cap*0        Sig: TAKE 1 CAPSULE BY MOUTH ONCE DAILY      Routing refill request to provider for review/approval because:  Labs not current:  creatinine    Carmencita Hamlin RN on 3/22/2023 at 10:02 AM

## 2023-04-11 NOTE — H&P
[facility] Anesthesia Pre-op History and Physical    Brittnee Herron MRN# [id number]   Age: 57 year old YOB: 1965      Date of Surgery: 4/12/2023 Location [facility]      Date of Exam 4/12/2023 Facility (Same day)       Home clinic: [facility]  Primary care provider: Alexandrea Mcqueen         Chief Complaint and/or Reason for Procedure:   No chief complaint on file.  Colonoscopy         Active problem list:     Patient Active Problem List    Diagnosis Date Noted     Essential hypertension 02/19/2021     Priority: Medium     Recurrent major depressive disorder, in full remission (H) 02/18/2021     Priority: Medium     LIAT (generalized anxiety disorder) 07/18/2018     Priority: Medium     Primary osteoarthritis of left knee 07/31/2017     Priority: Medium            Medications (include herbals and vitamins):   Any Plavix use in the last 7 days? No     No current facility-administered medications for this encounter.     Current Outpatient Medications   Medication Sig     losartan (COZAAR) 25 MG tablet TAKE 1 TABLET (25 MG) BY MOUTH DAILY OVERDUE FOR VISIT     bisacodyl (DULCOLAX) 5 MG EC tablet Take 2 tablets at 3 pm the day before your procedure. If your procedure is before 11 am, take 2 additional tablets at 11 pm. If your procedure is after 11 am, take 2 additional tablets at 6 am. For additional instructions refer to your colonoscopy prep instructions.     chlorthalidone (HYGROTON) 25 MG tablet Take 1 tablet (25 mg) by mouth daily for 90 days     etodolac (LODINE) 400 MG tablet TAKE 1 TABLET BY MOUTH TWICE A DAY     ondansetron (ZOFRAN-ODT) 4 MG ODT tab Take 1 tablet (4 mg) by mouth every 8 hours as needed for nausea     polyethylene glycol (GOLYTELY) 236 g suspension The night before the exam at 6 pm drink an 8-ounce glass every 15 minutes until the jug is half empty. If you arrive before 11 AM: Drink the other half of the Golytely jug  at 11 PM night before procedure. If you arrive after 11 AM: Drink the other half of the Oasys Water jug at 6 AM day of procedure. For additional instructions refer to your colonoscopy prep instructions.     venlafaxine (EFFEXOR XR) 150 MG 24 hr capsule TAKE 1 CAPSULE BY MOUTH ONCE DAILY             Allergies:      Allergies   Allergen Reactions     Lisinopril Cough     No Known Drug Allergies      Allergy to Latex? No  Allergy to tape?   No  Intolerances:             Physical Exam:   All vitals have been reviewed  No data found.  No intake/output data recorded.  Lungs:   No increased work of breathing, good air exchange, clear to auscultation bilaterally, no crackles or wheezing     Cardiovascular:   Normal apical impulse, regular rate and rhythm, normal S1 and S2, no S3 or S4, and no murmur noted             Lab / Radiology Results:            Anesthetic risk and/or ASA classification:       Armando Saunders MD

## 2023-04-12 ENCOUNTER — ANESTHESIA EVENT (OUTPATIENT)
Dept: GASTROENTEROLOGY | Facility: CLINIC | Age: 58
End: 2023-04-12
Payer: COMMERCIAL

## 2023-04-12 ENCOUNTER — ANESTHESIA (OUTPATIENT)
Dept: GASTROENTEROLOGY | Facility: CLINIC | Age: 58
End: 2023-04-12
Payer: COMMERCIAL

## 2023-04-12 ENCOUNTER — HOSPITAL ENCOUNTER (OUTPATIENT)
Facility: CLINIC | Age: 58
Discharge: HOME | End: 2023-04-12
Attending: INTERNAL MEDICINE | Admitting: INTERNAL MEDICINE
Payer: COMMERCIAL

## 2023-04-12 VITALS
DIASTOLIC BLOOD PRESSURE: 87 MMHG | OXYGEN SATURATION: 98 % | TEMPERATURE: 97.8 F | RESPIRATION RATE: 18 BRPM | HEART RATE: 73 BPM | SYSTOLIC BLOOD PRESSURE: 104 MMHG

## 2023-04-12 LAB — COLONOSCOPY: NORMAL

## 2023-04-12 PROCEDURE — 370N000017 HC ANESTHESIA TECHNICAL FEE, PER MIN: Performed by: INTERNAL MEDICINE

## 2023-04-12 PROCEDURE — 250N000009 HC RX 250: Performed by: NURSE ANESTHETIST, CERTIFIED REGISTERED

## 2023-04-12 PROCEDURE — 258N000003 HC RX IP 258 OP 636: Performed by: NURSE ANESTHETIST, CERTIFIED REGISTERED

## 2023-04-12 PROCEDURE — 45378 DIAGNOSTIC COLONOSCOPY: CPT | Performed by: INTERNAL MEDICINE

## 2023-04-12 PROCEDURE — G0121 COLON CA SCRN NOT HI RSK IND: HCPCS | Performed by: INTERNAL MEDICINE

## 2023-04-12 PROCEDURE — 250N000011 HC RX IP 250 OP 636: Performed by: NURSE ANESTHETIST, CERTIFIED REGISTERED

## 2023-04-12 RX ORDER — LIDOCAINE HYDROCHLORIDE 20 MG/ML
INJECTION, SOLUTION INFILTRATION; PERINEURAL PRN
Status: DISCONTINUED | OUTPATIENT
Start: 2023-04-12 | End: 2023-04-12

## 2023-04-12 RX ORDER — LIDOCAINE 40 MG/G
CREAM TOPICAL
Status: DISCONTINUED | OUTPATIENT
Start: 2023-04-12 | End: 2023-04-12 | Stop reason: HOSPADM

## 2023-04-12 RX ORDER — OXYCODONE HYDROCHLORIDE 5 MG/1
10 TABLET ORAL
Status: DISCONTINUED | OUTPATIENT
Start: 2023-04-12 | End: 2023-04-12 | Stop reason: HOSPADM

## 2023-04-12 RX ORDER — PROPOFOL 10 MG/ML
INJECTION, EMULSION INTRAVENOUS CONTINUOUS PRN
Status: DISCONTINUED | OUTPATIENT
Start: 2023-04-12 | End: 2023-04-12

## 2023-04-12 RX ORDER — NALOXONE HYDROCHLORIDE 0.4 MG/ML
0.4 INJECTION, SOLUTION INTRAMUSCULAR; INTRAVENOUS; SUBCUTANEOUS
Status: DISCONTINUED | OUTPATIENT
Start: 2023-04-12 | End: 2023-04-12 | Stop reason: HOSPADM

## 2023-04-12 RX ORDER — PROPOFOL 10 MG/ML
INJECTION, EMULSION INTRAVENOUS PRN
Status: DISCONTINUED | OUTPATIENT
Start: 2023-04-12 | End: 2023-04-12

## 2023-04-12 RX ORDER — SODIUM CHLORIDE, SODIUM LACTATE, POTASSIUM CHLORIDE, CALCIUM CHLORIDE 600; 310; 30; 20 MG/100ML; MG/100ML; MG/100ML; MG/100ML
INJECTION, SOLUTION INTRAVENOUS CONTINUOUS
Status: DISCONTINUED | OUTPATIENT
Start: 2023-04-12 | End: 2023-04-12 | Stop reason: HOSPADM

## 2023-04-12 RX ORDER — NALOXONE HYDROCHLORIDE 0.4 MG/ML
0.2 INJECTION, SOLUTION INTRAMUSCULAR; INTRAVENOUS; SUBCUTANEOUS
Status: DISCONTINUED | OUTPATIENT
Start: 2023-04-12 | End: 2023-04-12 | Stop reason: HOSPADM

## 2023-04-12 RX ORDER — SODIUM CHLORIDE, SODIUM LACTATE, POTASSIUM CHLORIDE, CALCIUM CHLORIDE 600; 310; 30; 20 MG/100ML; MG/100ML; MG/100ML; MG/100ML
INJECTION, SOLUTION INTRAVENOUS CONTINUOUS PRN
Status: DISCONTINUED | OUTPATIENT
Start: 2023-04-12 | End: 2023-04-12

## 2023-04-12 RX ORDER — FENTANYL CITRATE 50 UG/ML
25 INJECTION, SOLUTION INTRAMUSCULAR; INTRAVENOUS
Status: DISCONTINUED | OUTPATIENT
Start: 2023-04-12 | End: 2023-04-12 | Stop reason: HOSPADM

## 2023-04-12 RX ORDER — OXYCODONE HYDROCHLORIDE 5 MG/1
5 TABLET ORAL
Status: DISCONTINUED | OUTPATIENT
Start: 2023-04-12 | End: 2023-04-12 | Stop reason: HOSPADM

## 2023-04-12 RX ADMIN — LIDOCAINE HYDROCHLORIDE 1 ML: 10 INJECTION, SOLUTION EPIDURAL; INFILTRATION; INTRACAUDAL; PERINEURAL at 11:05

## 2023-04-12 RX ADMIN — PROPOFOL 100 MG: 10 INJECTION, EMULSION INTRAVENOUS at 12:02

## 2023-04-12 RX ADMIN — PROPOFOL 200 MCG/KG/MIN: 10 INJECTION, EMULSION INTRAVENOUS at 12:02

## 2023-04-12 RX ADMIN — LIDOCAINE HYDROCHLORIDE 50 MG: 20 INJECTION, SOLUTION INFILTRATION; PERINEURAL at 12:02

## 2023-04-12 RX ADMIN — PROPOFOL 50 MG: 10 INJECTION, EMULSION INTRAVENOUS at 12:06

## 2023-04-12 RX ADMIN — SODIUM CHLORIDE, POTASSIUM CHLORIDE, SODIUM LACTATE AND CALCIUM CHLORIDE: 600; 310; 30; 20 INJECTION, SOLUTION INTRAVENOUS at 11:55

## 2023-04-12 RX ADMIN — SODIUM CHLORIDE, POTASSIUM CHLORIDE, SODIUM LACTATE AND CALCIUM CHLORIDE: 600; 310; 30; 20 INJECTION, SOLUTION INTRAVENOUS at 11:05

## 2023-04-12 ASSESSMENT — LIFESTYLE VARIABLES: TOBACCO_USE: 0

## 2023-04-12 ASSESSMENT — ACTIVITIES OF DAILY LIVING (ADL)
ADLS_ACUITY_SCORE: 35
ADLS_ACUITY_SCORE: 35

## 2023-04-12 NOTE — DISCHARGE INSTRUCTIONS
[facility]    Home Care Following Endoscopy          Activity:  You have just undergone an endoscopic procedure usually performed with conscious sedation.  Do not work or operate machinery (including a car) for at least 12 hours.    I encourage you to walk and attempt to pass this air as soon as possible.    Diet:  Return to the diet you were on before your procedure but eat lightly for the first 12-24 hours.  Drink plenty of water.  Resume any regular medications unless otherwise advised by your physician.  Please begin any new medication prescribed as a result of your procedure as directed by your physician.   If you had any biopsy or polyp removed please refrain from aspirin or aspirin products for 2 days.  If on Coumadin please restart as instructed by your physician.   Pain:  You may take Tylenol as needed for pain.  Expected Recovery:  You can expect some mild abdominal fullness and/or discomfort due to the air used to inflate your intestinal tract.     Call Your Physician if You Have:    After Colonoscopy:  Worsening persisting abdominal pain which is worse with activity.  Fevers (>101 degrees F), chills or shakes.  Passage of continued blood with bowel movements.     Any questions or concerns about your recovery, please call [phone] or after hours [phone] ([phone]) Nurse Advice Line.    Follow-up Care:  You did NOT have polyps/biopsy tissue sample(s) removed.       Call [phone] with any concerns

## 2023-04-12 NOTE — ANESTHESIA CARE TRANSFER NOTE
Patient: Brittnee Herron    Procedure: Procedure(s):  COLONOSCOPY       Diagnosis: Screen for colon cancer [Z12.11]  Diagnosis Additional Information: No value filed.    Anesthesia Type:   MAC     Note:    Oropharynx: oropharynx clear of all foreign objects and spontaneously breathing  Level of Consciousness: awake  Oxygen Supplementation: room air    Independent Airway: airway patency satisfactory and stable  Dentition: dentition unchanged  Vital Signs Stable: post-procedure vital signs reviewed and stable  Report to RN Given: handoff report given  Patient transferred to: Phase II    Handoff Report: Identifed the Patient, Identified the Reponsible Provider, Reviewed the pertinent medical history, Discussed the surgical course, Reviewed Intra-OP anesthesia mangement and issues during anesthesia, Set expectations for post-procedure period and Allowed opportunity for questions and acknowledgement of understanding      Vitals:  Vitals Value Taken Time   BP     Temp     Pulse     Resp     SpO2 97 % 04/12/23 1236   Vitals shown include unvalidated device data.    Electronically Signed By: CLEMENTE Rolle CRNA  April 12, 2023  12:38 PM

## 2023-04-12 NOTE — ANESTHESIA POSTPROCEDURE EVALUATION
Patient: Brittnee Herron    Procedure: Procedure(s):  COLONOSCOPY       Anesthesia Type:  MAC    Note:  Disposition: Outpatient   Postop Pain Control: Uneventful            Sign Out: Well controlled pain   PONV: No   Neuro/Psych: Uneventful            Sign Out: Acceptable/Baseline neuro status   Airway/Respiratory: Uneventful            Sign Out: Acceptable/Baseline resp. status   CV/Hemodynamics: Uneventful            Sign Out: Acceptable CV status   Other NRE: NONE   DID A NON-ROUTINE EVENT OCCUR? No    Event details/Postop Comments:  Pt was happy with anesthesia care.  No complications.  I will follow up with the pt if needed.           Last vitals:  Vitals:    04/12/23 1036   BP: (!) 138/90   Pulse: 70   Resp: 18   Temp: 97.8  F (36.6  C)   SpO2: 95%       Electronically Signed By: CLEMENTE Rolle CRNA  April 12, 2023  12:38 PM

## 2023-04-12 NOTE — ANESTHESIA PREPROCEDURE EVALUATION
Anesthesia Pre-Procedure Evaluation    Patient: Brittnee Herron   MRN: [id number] : 1965        Procedure : Procedure(s):  COLONOSCOPY          Past Medical History:   Diagnosis Date     Depressive disorder, not elsewhere classified 1982    off meds for a year      Past Surgical History:   Procedure Laterality Date     ARTHROSCOPY KNEE WITH MEDIAL MENISCECTOMY Left 2017    Procedure: ARTHROSCOPY KNEE WITH MEDIAL MENISCECTOMY;  left knee arthroscopy with partial medial menisectomy;  Surgeon: Brittnee Bean MD;  Location: [facility]     HC REMOVAL OF OVARIAN CYST(S)      laparoscopic     HC REMOVE TONSILS/ADENOIDS,<11 Y/O       ZZC LIGATE FALLOPIAN TUBE,POSTPARTUM  2002    Postpartum bilateral tubal ligation with partial salpingectomy through a mini laparotomy      Allergies   Allergen Reactions     Lisinopril Cough     No Known Drug Allergies       Social History     Tobacco Use     Smoking status: Never     Smokeless tobacco: Never   Vaping Use     Vaping status: Never Used   Substance Use Topics     Alcohol use: No      Wt Readings from Last 1 Encounters:   23 89.4 kg (197 lb 2 oz)        Anesthesia Evaluation   Pt has had prior anesthetic. Type: MAC and General.    No history of anesthetic complications       ROS/MED HX  ENT/Pulmonary:  - neg pulmonary ROS  (-) tobacco use   Neurologic:  - neg neurologic ROS     Cardiovascular:     (+) hypertension-----    METS/Exercise Tolerance:     Hematologic:  - neg hematologic  ROS     Musculoskeletal:  - neg musculoskeletal ROS     GI/Hepatic:  - neg GI/hepatic ROS     Renal/Genitourinary:  - neg Renal ROS     Endo:  - neg endo ROS     Psychiatric/Substance Use:     (+) psychiatric history anxiety and depression     Infectious Disease:  - neg infectious disease ROS     Malignancy:  - neg malignancy ROS     Other:  - neg other ROS          Physical Exam    Airway  airway exam normal      Mallampati: II   TM distance: > 3 FB   Neck ROM: full    Mouth opening: > 3 cm    Respiratory Devices and Support         Dental           Cardiovascular   cardiovascular exam normal       Rhythm and rate: regular and normal     Pulmonary   pulmonary exam normal        breath sounds clear to auscultation           OUTSIDE LABS:  CBC:   Lab Results   Component Value Date    WBC 7.2 02/01/2021    WBC 6.6 11/14/2020    HGB 13.9 02/01/2021    HGB 14.2 11/14/2020    HCT 42.5 02/01/2021    HCT 44.8 11/14/2020     02/01/2021     11/14/2020     BMP:   Lab Results   Component Value Date     04/01/2021     02/01/2021    POTASSIUM 4.3 04/01/2021    POTASSIUM 4.0 02/01/2021    CHLORIDE 106 04/01/2021    CHLORIDE 103 02/01/2021    CO2 30 04/01/2021    CO2 32 02/01/2021    BUN 12 04/01/2021    BUN 15 02/01/2021    CR 0.52 04/01/2021    CR 0.63 02/01/2021    GLC 98 04/01/2021     (H) 02/01/2021     COAGS: No results found for: PTT, INR, FIBR  POC: No results found for: BGM, HCG, HCGS  HEPATIC:   Lab Results   Component Value Date    ALBUMIN 3.7 02/01/2021    PROTTOTAL 8.0 02/01/2021    ALT 26 02/01/2021    AST 18 02/01/2021    ALKPHOS 84 02/01/2021    BILITOTAL 0.3 02/01/2021     OTHER:   Lab Results   Component Value Date    PH 7.5 (H) 04/17/2003    BOBBI 8.8 04/01/2021    LIPASE 207 11/14/2020    TSH 4.60 (H) 07/14/2015    T4 0.80 07/14/2015       Anesthesia Plan    ASA Status:  2   NPO Status:  NPO Appropriate    Anesthesia Type: MAC.     - Reason for MAC: straight local not clinically adequate   Induction: Intravenous, Propofol.   Maintenance: TIVA.        Consents    Anesthesia Plan(s) and associated risks, benefits, and realistic alternatives discussed. Questions answered and patient/representative(s) expressed understanding.    - Discussed:     - Discussed with:  Patient      - Extended Intubation/Ventilatory Support Discussed: No.      - Patient is DNR/DNI Status: No    Use of blood products discussed: No .     Postoperative Care    Pain  management: IV analgesics.        Comments:    Other Comments: The risks and benefits of anesthesia, and the alternatives where applicable, have been discussed with the patient, and they wish to proceed.            CLEMENTE Rolle CRNA

## 2023-04-18 DIAGNOSIS — I10 BENIGN ESSENTIAL HYPERTENSION: ICD-10-CM

## 2023-04-19 RX ORDER — LOSARTAN POTASSIUM 25 MG/1
25 TABLET ORAL DAILY
Qty: 30 TABLET | Refills: 0 | Status: SHIPPED | OUTPATIENT
Start: 2023-04-19 | End: 2023-05-22

## 2023-04-19 NOTE — TELEPHONE ENCOUNTER
"Requested Prescriptions   Pending Prescriptions Disp Refills    losartan (COZAAR) 25 MG tablet [Pharmacy Med Name: LOSARTAN 25MG TABLET] 30 tablet 0     Sig: TAKE 1 TABLET (25 MG) BY MOUTH DAILY OVERDUE FOR VISIT       Angiotensin-II Receptors Failed - 4/18/2023  1:15 AM        Failed - Normal serum creatinine on file in past 12 months     Recent Labs   Lab Test 04/01/21  1001   CR 0.52       Ok to refill medication if creatinine is low          Failed - Normal serum potassium on file in past 12 months     Recent Labs   Lab Test 04/01/21  1001   POTASSIUM 4.3                    Passed - Last blood pressure under 140/90 in past 12 months     BP Readings from Last 3 Encounters:   04/12/23 104/87   01/05/23 120/84   04/01/21 (!) 150/88                 Passed - Recent (12 mo) or future (30 days) visit within the authorizing provider's specialty     Patient has had an office visit with the authorizing provider or a provider within the authorizing providers department within the previous 12 mos or has a future within next 30 days. See \"Patient Info\" tab in inbasket, or \"Choose Columns\" in Meds & Orders section of the refill encounter.              Passed - Medication is active on med list        Passed - Patient is age 18 or older        Passed - No active pregnancy on record        Passed - No positive pregnancy test in past 12 months             "

## 2023-04-23 DIAGNOSIS — M17.12 PRIMARY OSTEOARTHRITIS OF LEFT KNEE: ICD-10-CM

## 2023-04-24 NOTE — TELEPHONE ENCOUNTER
Pending Prescriptions:                       Disp   Refills    etodolac (LODINE) 400 MG tablet [Pharmacy *180 ta*0        Sig: TAKE 1 TABLET BY MOUTH TWICE A DAY      Routing refill request to provider for review/approval because:  Labs not current:  alt, ast, cbc, creatinine    Carmencita Hamlin RN on 4/24/2023 at 4:17 PM

## 2023-04-25 RX ORDER — ETODOLAC 400 MG
TABLET ORAL
Qty: 180 TABLET | Refills: 0 | Status: SHIPPED | OUTPATIENT
Start: 2023-04-25 | End: 2023-06-20

## 2023-05-01 DIAGNOSIS — F41.1 GAD (GENERALIZED ANXIETY DISORDER): ICD-10-CM

## 2023-05-02 RX ORDER — VENLAFAXINE HYDROCHLORIDE 150 MG/1
CAPSULE, EXTENDED RELEASE ORAL
Qty: 90 CAPSULE | Refills: 0 | Status: SHIPPED | OUTPATIENT
Start: 2023-05-02 | End: 2023-05-16

## 2023-05-02 NOTE — TELEPHONE ENCOUNTER
Pending Prescriptions:                       Disp   Refills    venlafaxine (EFFEXOR XR) 150 MG 24 hr caps*90 cap*0        Sig: TAKE 1 CAPSULE BY MOUTH ONCE DAILY      Routing refill request to provider for review/approval because:  Labs not current:  creatinine    Carmencita Hamlin RN on 5/2/2023 at 1:45 PM

## 2023-05-15 DIAGNOSIS — F41.1 GAD (GENERALIZED ANXIETY DISORDER): ICD-10-CM

## 2023-05-16 RX ORDER — VENLAFAXINE HYDROCHLORIDE 150 MG/1
CAPSULE, EXTENDED RELEASE ORAL
Qty: 90 CAPSULE | Refills: 0 | Status: SHIPPED | OUTPATIENT
Start: 2023-05-16 | End: 2023-09-11

## 2023-05-17 DIAGNOSIS — I10 BENIGN ESSENTIAL HYPERTENSION: ICD-10-CM

## 2023-05-18 NOTE — TELEPHONE ENCOUNTER
"Requested Prescriptions   Pending Prescriptions Disp Refills    losartan (COZAAR) 25 MG tablet [Pharmacy Med Name: LOSARTAN 25MG TABLET] 30 tablet 0     Sig: TAKE 1 TABLET BY MOUTH EVERY DAY - *MUST MAKE APPT. BEFORE ANY ADDITIONAL REFILLS* -       Angiotensin-II Receptors Failed - 5/17/2023  1:34 AM        Failed - Normal serum creatinine on file in past 12 months     Recent Labs   Lab Test 04/01/21  1001   CR 0.52       Ok to refill medication if creatinine is low          Failed - Normal serum potassium on file in past 12 months     Recent Labs   Lab Test 04/01/21  1001   POTASSIUM 4.3                    Passed - Last blood pressure under 140/90 in past 12 months     BP Readings from Last 3 Encounters:   04/12/23 104/87   01/05/23 120/84   04/01/21 (!) 150/88                 Passed - Recent (12 mo) or future (30 days) visit within the authorizing provider's specialty     Patient has had an office visit with the authorizing provider or a provider within the authorizing providers department within the previous 12 mos or has a future within next 30 days. See \"Patient Info\" tab in inbasket, or \"Choose Columns\" in Meds & Orders section of the refill encounter.              Passed - Medication is active on med list        Passed - Patient is age 18 or older        Passed - No active pregnancy on record        Passed - No positive pregnancy test in past 12 months               "

## 2023-05-18 NOTE — TELEPHONE ENCOUNTER
Routing refill request to provider for review/approval because:    Requested Prescriptions   Pending Prescriptions Disp Refills    losartan (COZAAR) 25 MG tablet [Pharmacy Med Name: LOSARTAN 25MG TABLET] 30 tablet 0     Sig: TAKE 1 TABLET BY MOUTH EVERY DAY - *MUST MAKE APPT. BEFORE ANY ADDITIONAL REFILLS* -       Angiotensin-II Receptors Failed - 5/17/2023  1:34 AM        Failed - Normal serum creatinine on file in past 12 months     Recent Labs   Lab Test 04/01/21  1001   CR 0.52       Ok to refill medication if creatinine is low          Failed - Normal serum potassium on file in past 12 months     Recent Labs   Lab Test 04/01/21  1001   POTASSIUM 4.3                    Passed - Last blood pressure under 140/90 in past 12 months     BP Readings from Last 3 Encounters:   04/12/23 104/87   01/05/23 120/84   04/01/21 (!) 150/88

## 2023-05-22 RX ORDER — LOSARTAN POTASSIUM 25 MG/1
TABLET ORAL
Qty: 30 TABLET | Refills: 0 | Status: SHIPPED | OUTPATIENT
Start: 2023-05-22 | End: 2023-06-15

## 2023-06-15 DIAGNOSIS — I10 BENIGN ESSENTIAL HYPERTENSION: ICD-10-CM

## 2023-06-15 RX ORDER — LOSARTAN POTASSIUM 25 MG/1
TABLET ORAL
Qty: 30 TABLET | Refills: 0 | Status: SHIPPED | OUTPATIENT
Start: 2023-06-15 | End: 2023-07-21

## 2023-06-15 NOTE — TELEPHONE ENCOUNTER
"Requested Prescriptions   Pending Prescriptions Disp Refills    losartan (COZAAR) 25 MG tablet [Pharmacy Med Name: LOSARTAN 25MG TABLET] 30 tablet 0     Sig: TAKE 1 TABLET BY MOUTH EVERY DAY - *MUST MAKE APPT. BEFORE ANY ADDITIONALREFILLS* -       Angiotensin-II Receptors Failed - 6/15/2023  1:13 AM        Failed - Normal serum creatinine on file in past 12 months     Recent Labs   Lab Test 04/01/21  1001   CR 0.52       Ok to refill medication if creatinine is low          Failed - Normal serum potassium on file in past 12 months     Recent Labs   Lab Test 04/01/21  1001   POTASSIUM 4.3                    Passed - Last blood pressure under 140/90 in past 12 months     BP Readings from Last 3 Encounters:   04/12/23 104/87   01/05/23 120/84   04/01/21 (!) 150/88                 Passed - Recent (12 mo) or future (30 days) visit within the authorizing provider's specialty     Patient has had an office visit with the authorizing provider or a provider within the authorizing providers department within the previous 12 mos or has a future within next 30 days. See \"Patient Info\" tab in inbasket, or \"Choose Columns\" in Meds & Orders section of the refill encounter.              Passed - Medication is active on med list        Passed - Patient is age 18 or older        Passed - No active pregnancy on record        Passed - No positive pregnancy test in past 12 months               "

## 2023-06-19 DIAGNOSIS — M17.12 PRIMARY OSTEOARTHRITIS OF LEFT KNEE: ICD-10-CM

## 2023-06-20 RX ORDER — ETODOLAC 400 MG
TABLET ORAL
Qty: 180 TABLET | Refills: 0 | Status: SHIPPED | OUTPATIENT
Start: 2023-06-20 | End: 2023-12-06

## 2023-06-20 NOTE — TELEPHONE ENCOUNTER
Pending Prescriptions:                       Disp   Refills    etodolac (LODINE) 400 MG tablet [Pharmacy *180 ta*0        Sig: TAKE 1 TABLET BY MOUTH TWICE A DAY      Routing refill request to provider for review/approval because:  Labs not current:  alt, ast, cbc, cr    Carmencita Hamlin RN on 6/20/2023 at 10:19 AM

## 2023-07-21 DIAGNOSIS — I10 BENIGN ESSENTIAL HYPERTENSION: ICD-10-CM

## 2023-07-21 RX ORDER — LOSARTAN POTASSIUM 25 MG/1
TABLET ORAL
Qty: 30 TABLET | Refills: 0 | Status: SHIPPED | OUTPATIENT
Start: 2023-07-21 | End: 2023-12-04

## 2023-07-21 NOTE — TELEPHONE ENCOUNTER
"Routing refill request to provider for review/approval because:    Requested Prescriptions   Pending Prescriptions Disp Refills    losartan (COZAAR) 25 MG tablet [Pharmacy Med Name: LOSARTAN 25MG TABLET] 30 tablet 0     Sig: TAKE 1 TABLET BY MOUTH EVERY DAY - *MUST MAKE APPT. BEFORE ANY ADDITIONALREFILLS* -       Angiotensin-II Receptors Failed - 7/21/2023  1:19 AM        Failed - Normal serum creatinine on file in past 12 months     Recent Labs   Lab Test 04/01/21  1001   CR 0.52       Ok to refill medication if creatinine is low          Failed - Normal serum potassium on file in past 12 months     Recent Labs   Lab Test 04/01/21  1001   POTASSIUM 4.3                    Passed - Last blood pressure under 140/90 in past 12 months     BP Readings from Last 3 Encounters:   04/12/23 104/87   01/05/23 120/84   04/01/21 (!) 150/88                 Passed - Recent (12 mo) or future (30 days) visit within the authorizing provider's specialty     Patient has had an office visit with the authorizing provider or a provider within the authorizing providers department within the previous 12 mos or has a future within next 30 days. See \"Patient Info\" tab in inbasket, or \"Choose Columns\" in Meds & Orders section of the refill encounter.              Passed - Medication is active on med list        Passed - Patient is age 18 or older        Passed - No active pregnancy on record        Passed - No positive pregnancy test in past 12 months                   "

## 2023-08-09 ENCOUNTER — ANCILLARY PROCEDURE (OUTPATIENT)
Dept: GENERAL RADIOLOGY | Facility: CLINIC | Age: 58
End: 2023-08-09
Attending: ORTHOPAEDIC SURGERY
Payer: COMMERCIAL

## 2023-08-09 ENCOUNTER — OFFICE VISIT (OUTPATIENT)
Dept: ORTHOPEDICS | Facility: CLINIC | Age: 58
End: 2023-08-09
Payer: COMMERCIAL

## 2023-08-09 VITALS — SYSTOLIC BLOOD PRESSURE: 126 MMHG | DIASTOLIC BLOOD PRESSURE: 67 MMHG | OXYGEN SATURATION: 97 % | HEART RATE: 83 BPM

## 2023-08-09 DIAGNOSIS — M17.11 PRIMARY OSTEOARTHRITIS OF RIGHT KNEE: ICD-10-CM

## 2023-08-09 DIAGNOSIS — M25.561 RIGHT MEDIAL KNEE PAIN: ICD-10-CM

## 2023-08-09 DIAGNOSIS — M25.561 RIGHT MEDIAL KNEE PAIN: Primary | ICD-10-CM

## 2023-08-09 PROCEDURE — 99204 OFFICE O/P NEW MOD 45 MIN: CPT | Mod: 25 | Performed by: ORTHOPAEDIC SURGERY

## 2023-08-09 PROCEDURE — 73562 X-RAY EXAM OF KNEE 3: CPT | Mod: TC | Performed by: RADIOLOGY

## 2023-08-09 PROCEDURE — 20610 DRAIN/INJ JOINT/BURSA W/O US: CPT | Mod: RT | Performed by: ORTHOPAEDIC SURGERY

## 2023-08-09 RX ORDER — METHYLPREDNISOLONE ACETATE 80 MG/ML
80 INJECTION, SUSPENSION INTRA-ARTICULAR; INTRALESIONAL; INTRAMUSCULAR; SOFT TISSUE
Status: SHIPPED | OUTPATIENT
Start: 2023-08-09

## 2023-08-09 RX ORDER — LIDOCAINE HYDROCHLORIDE 10 MG/ML
4 INJECTION, SOLUTION INFILTRATION; PERINEURAL
Status: SHIPPED | OUTPATIENT
Start: 2023-08-09

## 2023-08-09 RX ADMIN — METHYLPREDNISOLONE ACETATE 80 MG: 80 INJECTION, SUSPENSION INTRA-ARTICULAR; INTRALESIONAL; INTRAMUSCULAR; SOFT TISSUE at 11:19

## 2023-08-09 RX ADMIN — LIDOCAINE HYDROCHLORIDE 4 ML: 10 INJECTION, SOLUTION INFILTRATION; PERINEURAL at 11:19

## 2023-08-09 ASSESSMENT — PAIN SCALES - GENERAL: PAINLEVEL: MODERATE PAIN (4)

## 2023-08-09 NOTE — PROGRESS NOTES
SUBJECTIVE:   Brittnee Hreron is a 57 year old female who is seen as self referral for evaluation of right knee pain.  Duration: few days  No known injury. Had a fall last year but no pain up until recently    Present symptoms:   Stiffness  Pain doing stairs  Pain bending knee   swelling   No catching, locking or giving-way.   Night pain.  Diffuse pain     Treatments tried to this point: NSAIDs (etodolac-- takes for left knee) and ice/heat    Previous knee issues: history of osteoarthritis left knee history of left knee arthroscopy   2017  Past Medical History:   Past Medical History:   Diagnosis Date    Depressive disorder, not elsewhere classified 1982    off meds for a year     Past Surgical History:   Past Surgical History:   Procedure Laterality Date    ARTHROSCOPY KNEE WITH MEDIAL MENISCECTOMY Left 7/18/2017    Procedure: ARTHROSCOPY KNEE WITH MEDIAL MENISCECTOMY;  left knee arthroscopy with partial medial menisectomy;  Surgeon: Brittnee Bean MD;  Location: [facility]    COLONOSCOPY N/A 4/12/2023    Procedure: COLONOSCOPY;  Surgeon: Armando Saunders MD;  Location: [facility]    HC REMOVAL OF OVARIAN CYST(S)  1990    laparoscopic    HC REMOVE TONSILS/ADENOIDS,<11 Y/O      ZZC LIGATE FALLOPIAN TUBE,POSTPARTUM  08/03/2002    Postpartum bilateral tubal ligation with partial salpingectomy through a mini laparotomy     Family History:   Family History   Problem Relation Age of Onset    Arthritis Mother     Hypertension Mother     Alcohol/Drug Maternal Grandmother         alcohol    Alcohol/Drug Maternal Grandfather         alcohol    Diabetes Maternal Grandfather         adult-onset    Depression Brother      Social History:   Social History     Tobacco Use    Smoking status: Never    Smokeless tobacco: Never   Substance Use Topics    Alcohol use: No       Review of Systems:  Constitutional:  NEGATIVE for fever, chills, change in weight  Integumentary/Skin:  NEGATIVE for worrisome rashes, moles or lesions  Eyes:   NEGATIVE for vision changes or irritation  ENT/Mouth:  NEGATIVE for ear, mouth and throat problems  Resp:  NEGATIVE for significant cough or SOB  Breast:  NEGATIVE for masses, tenderness or discharge  CV:  NEGATIVE for chest pain, palpitations or peripheral edema  GI:  NEGATIVE for nausea, abdominal pain, heartburn, or change in bowel habits  :  Negative   Musculoskeletal:  See HPI above  Neuro:  NEGATIVE for weakness, dizziness or paresthesias  Endocrine:  NEGATIVE for temperature intolerance, skin/hair changes  Heme/allergy/immune:  NEGATIVE for bleeding problems  Psychiatric:  NEGATIVE for changes in mood or affect      OBJECTIVE:  Physical Exam:  /67 (BP Location: Right arm, Patient Position: Sitting, Cuff Size: Adult Regular)   Pulse 83   LMP 07/10/2018   SpO2 97%   General Appearance: healthy, alert and no distress   Skin: no suspicious lesions or rashes  Neuro: Normal strength and tone, mentation intact and speech normal  Vascular: good pulses, and cappillary refill   Lymph: no lymphadenopathy   Psych:  mentation appears normal and affect normal/bright  Resp: no increased work of breathing     Right Knee Exam:  Gait: walks with antalgic gait favoring right side  Alignment: normal   Patellofemoral joint: mild crepitations in the patellofemoral joint.  Effusion: moderate  ROM: 5-120*  Tender: medial joint line  Masses: none  Ligaments:   Lachman's: stable   Anterior/Posterior drawer: stable,   Varus/Valgus stress: stable to varus and valgus stress  McMurrays: pain but no catching with hyperflexion    X-rays:  Obtained today, reviewed in the office with the patient today:  nearly complete medial joint space narrowing        ASSESSMENT:   Osteoarthritis right knee    PLAN:     Knee injection: With the patient's consent, right knee(s) injected intra-articularly with 80mg of Depomedrol and 4cc of local anesthetic after sterile prep.   brace trial ordered  Physical therapy ordered. For home  "exercise program teaching and  brace trial    * reviewed imaging studies with patient, showing arthritis. Arthritis is wearing of the cartilage due to longstanding \"wear and tear\" or can follow an injury to the joint.    Discussed typical symptoms of arthritic pain is pain aggravated with weight bearing activities, stiffness, relieved by sitting or rest. Swelling may be associated. It is common to have some grinding and popping in the knee with arthritis.    Workup for degenerative knee arthrosis and pain, typically starts with plain xrays of the knee. Xrays are usually all that is needed for evaluation of ongoing knee pain for arthritis, as they show the bony anatomy well and underlying degenerative changes. An MRI is not usually needed in cases of degenerative knee pain and arthritis, as degenerative cartilage and meniscal changes seen on MRI are expected, given findings on xray. MRI may be indicated if the arthritis is mild and there are mechanical symptoms such as locking or catching in the knee, or an acute knee injury.    Discussed various treatments for degenerative arthrosis of the knee, including nonoperative and operative approaches. Nonoperative approaches, which are exhausted prior to operative treatment, include doing nothing and living with the pain as patient has been doing, activity modification (avoid aggravating activities), physical therapy and strengthening exercises, weight loss, anti-inflammatory medications, bracing, ambulatory aids (cane, walker) and injections. Once these have been tried and are deemed unsuccessful with a good effort, and the patient is an appropriate candidate, the next treatment would be knee arthroplasty or replacement. Depending on the location of the arthritis, knee replacement can be partial (one side of the knee affected by arthritis) or a total knee arthroplasty (all 3 compartments). The risks, perceived benefits and perioperative rehabilitation expectations " of knee arthroplasty were discussed in detail. Also discussed that approximately 10% of patients that undergo knee arthroplasty are not happy following surgery and may have worse pain or no improvement in pain, contrary to their preoperative expectations.    At this time, nonoperative treatment will be pursued.      Return to clinic: as needed     SHALINI Walden MD  Dept. Orthopedic Surgery  [facility]

## 2023-08-09 NOTE — LETTER
8/9/2023         RE: Brittnee Herron  [id number] [facility] [address]        Dear Colleague,    Thank you for referring your patient, Brittnee Herron, to the [facility]. Please see a copy of my visit note below.    SUBJECTIVE:   Brittnee Herron is a 57 year old female who is seen as self referral for evaluation of right knee pain.  Duration: few days  No known injury. Had a fall last year but no pain up until recently    Present symptoms:   Stiffness  Pain doing stairs  Pain bending knee   swelling   No catching, locking or giving-way.   Night pain.  Diffuse pain     Treatments tried to this point: NSAIDs (etodolac-- takes for left knee) and ice/heat    Previous knee issues: history of osteoarthritis left knee history of left knee arthroscopy   2017  Past Medical History:   Past Medical History:   Diagnosis Date     Depressive disorder, not elsewhere classified 1982    off meds for a year     Past Surgical History:   Past Surgical History:   Procedure Laterality Date     ARTHROSCOPY KNEE WITH MEDIAL MENISCECTOMY Left 7/18/2017    Procedure: ARTHROSCOPY KNEE WITH MEDIAL MENISCECTOMY;  left knee arthroscopy with partial medial menisectomy;  Surgeon: Brittnee Bean MD;  [facility]     COLONOSCOPY N/A 4/12/2023    Procedure: COLONOSCOPY;  Surgeon: Armando Saunders MD;  Location: [facility]     HC REMOVAL OF OVARIAN CYST(S)  1990    laparoscopic     HC REMOVE TONSILS/ADENOIDS,<11 Y/O       ZZC LIGATE FALLOPIAN TUBE,POSTPARTUM  08/03/2002    Postpartum bilateral tubal ligation with partial salpingectomy through a mini laparotomy     Family History:   Family History   Problem Relation Age of Onset     Arthritis Mother      Hypertension Mother      Alcohol/Drug Maternal Grandmother         alcohol     Alcohol/Drug Maternal Grandfather         alcohol     Diabetes Maternal Grandfather         adult-onset     Depression Brother      Social History:   Social History     Tobacco Use      Smoking status: Never     Smokeless tobacco: Never   Substance Use Topics     Alcohol use: No       Review of Systems:  Constitutional:  NEGATIVE for fever, chills, change in weight  Integumentary/Skin:  NEGATIVE for worrisome rashes, moles or lesions  Eyes:  NEGATIVE for vision changes or irritation  ENT/Mouth:  NEGATIVE for ear, mouth and throat problems  Resp:  NEGATIVE for significant cough or SOB  Breast:  NEGATIVE for masses, tenderness or discharge  CV:  NEGATIVE for chest pain, palpitations or peripheral edema  GI:  NEGATIVE for nausea, abdominal pain, heartburn, or change in bowel habits  :  Negative   Musculoskeletal:  See HPI above  Neuro:  NEGATIVE for weakness, dizziness or paresthesias  Endocrine:  NEGATIVE for temperature intolerance, skin/hair changes  Heme/allergy/immune:  NEGATIVE for bleeding problems  Psychiatric:  NEGATIVE for changes in mood or affect      OBJECTIVE:  Physical Exam:  /67 (BP Location: Right arm, Patient Position: Sitting, Cuff Size: Adult Regular)   Pulse 83   LMP 07/10/2018   SpO2 97%   General Appearance: healthy, alert and no distress   Skin: no suspicious lesions or rashes  Neuro: Normal strength and tone, mentation intact and speech normal  Vascular: good pulses, and cappillary refill   Lymph: no lymphadenopathy   Psych:  mentation appears normal and affect normal/bright  Resp: no increased work of breathing     Right Knee Exam:  Gait: walks with antalgic gait favoring right side  Alignment: normal   Patellofemoral joint: mild crepitations in the patellofemoral joint.  Effusion: moderate  ROM: 5-120*  Tender: medial joint line  Masses: none  Ligaments:   Lachman's: stable   Anterior/Posterior drawer: stable,   Varus/Valgus stress: stable to varus and valgus stress  McMurrays: pain but no catching with hyperflexion    X-rays:  Obtained today, reviewed in the office with the patient today:  nearly complete medial joint space narrowing        ASSESSMENT:  "  Osteoarthritis right knee    PLAN:     Knee injection: With the patient's consent, right knee(s) injected intra-articularly with 80mg of Depomedrol and 4cc of local anesthetic after sterile prep.   brace trial ordered  Physical therapy ordered. For home exercise program teaching and  brace trial    * reviewed imaging studies with patient, showing arthritis. Arthritis is wearing of the cartilage due to longstanding \"wear and tear\" or can follow an injury to the joint.    Discussed typical symptoms of arthritic pain is pain aggravated with weight bearing activities, stiffness, relieved by sitting or rest. Swelling may be associated. It is common to have some grinding and popping in the knee with arthritis.    Workup for degenerative knee arthrosis and pain, typically starts with plain xrays of the knee. Xrays are usually all that is needed for evaluation of ongoing knee pain for arthritis, as they show the bony anatomy well and underlying degenerative changes. An MRI is not usually needed in cases of degenerative knee pain and arthritis, as degenerative cartilage and meniscal changes seen on MRI are expected, given findings on xray. MRI may be indicated if the arthritis is mild and there are mechanical symptoms such as locking or catching in the knee, or an acute knee injury.    Discussed various treatments for degenerative arthrosis of the knee, including nonoperative and operative approaches. Nonoperative approaches, which are exhausted prior to operative treatment, include doing nothing and living with the pain as patient has been doing, activity modification (avoid aggravating activities), physical therapy and strengthening exercises, weight loss, anti-inflammatory medications, bracing, ambulatory aids (cane, walker) and injections. Once these have been tried and are deemed unsuccessful with a good effort, and the patient is an appropriate candidate, the next treatment would be knee arthroplasty " or replacement. Depending on the location of the arthritis, knee replacement can be partial (one side of the knee affected by arthritis) or a total knee arthroplasty (all 3 compartments). The risks, perceived benefits and perioperative rehabilitation expectations of knee arthroplasty were discussed in detail. Also discussed that approximately 10% of patients that undergo knee arthroplasty are not happy following surgery and may have worse pain or no improvement in pain, contrary to their preoperative expectations.    At this time, nonoperative treatment will be pursued.      Return to clinic: as needed     SHALINI Walden MD  Dept. Orthopedic Surgery  [facility]      Large Joint Injection/Arthocentesis: R knee joint    Date/Time: 8/9/2023 11:19 AM    Performed by: Arturo Mcmahon  Authorized by: Lukas Walden MD    Indications:  Pain and osteoarthritis  Needle Size:  22 G  Approach:  Anterolateral  Location:  Knee      Medications:  80 mg methylPREDNISolone 80 MG/ML; 4 mL lidocaine 1 %  Outcome:  Tolerated well, no immediate complications  Procedure discussed: discussed risks, benefits, and alternatives    Consent Given by:  Patient  Timeout: timeout called immediately prior to procedure    Prep: patient was prepped and draped in usual sterile fashion          Again, thank you for allowing me to participate in the care of your patient.        Sincerely,        Lukas Walden MD

## 2023-08-09 NOTE — PATIENT INSTRUCTIONS
AFTER VISIT SUMMARY    [facility] CORTISONE Injection Discharge Instructions    You may shower, however avoid swimming, tub baths or hot tubs for 24 hours following your procedure    You may have a mild to moderate increase in pain for several days following the injection.    It may take up to 14 days for the steroid medication to start working although you may feel the effect as early as a few days after the procedure.    You may use ice packs for 10-15 minutes, 3 to 4 times a day at the injection site for comfort    You may use anti-inflammatory medications (such as Ibuprofen or Aleve or Advil) or Tylenol for pain control if necessary    If you were fasting, you may resume your normal diet and medications after the procedure    If you have diabetes, check your blood sugar more frequently than usual as your blood sugar may be higher than normal for 10-14 days following a steroid injection. Contact your doctor who manages your diabetes if your blood sugar is higher than usual      If you experience any of the following, call [facility] [phone]  -Fever over 100 degree F  -Swelling, bleeding, redness, drainage, warmth at the injection site  - New or worsening pain

## 2023-08-09 NOTE — PROGRESS NOTES
Large Joint Injection/Arthocentesis: R knee joint    Date/Time: 8/9/2023 11:19 AM    Performed by: Arturo Mcmahon  Authorized by: Lukas Walden MD    Indications:  Pain and osteoarthritis  Needle Size:  22 G  Approach:  Anterolateral  Location:  Knee      Medications:  80 mg methylPREDNISolone 80 MG/ML; 4 mL lidocaine 1 %  Outcome:  Tolerated well, no immediate complications  Procedure discussed: discussed risks, benefits, and alternatives    Consent Given by:  Patient  Timeout: timeout called immediately prior to procedure    Prep: patient was prepped and draped in usual sterile fashion

## 2023-08-30 NOTE — ADDENDUM NOTE
Encounter addended by: Kennedi Dobson, PT on: 6/14/2017  9:42 AM<BR>     Actions taken: Episode resolved, Sign clinical note Patient is calling regarding cancelling an appointment.    Date/Time: 09/05/23 @ 9:30 am    Reason: Out of town    Patient was rescheduled: YES [x] NO []  If yes, when was Patient reschedule for: 09/08/23 @ 8:30 am    Patient requesting call back to reschedule: YES [] NO [x]

## 2023-09-11 DIAGNOSIS — F41.1 GAD (GENERALIZED ANXIETY DISORDER): ICD-10-CM

## 2023-09-11 NOTE — TELEPHONE ENCOUNTER
Patient calling to request. Patient stating the pharmacy has sent request and that she is needing filled as soon as possible. Informed there is nothing noted that we have received any request. Informed the request is being placed. Lucie Burger LPN

## 2023-09-12 RX ORDER — VENLAFAXINE HYDROCHLORIDE 150 MG/1
150 CAPSULE, EXTENDED RELEASE ORAL DAILY
Qty: 90 CAPSULE | Refills: 0 | Status: SHIPPED | OUTPATIENT
Start: 2023-09-12 | End: 2023-12-01

## 2023-11-09 ENCOUNTER — TELEPHONE (OUTPATIENT)
Dept: ORTHOPEDICS | Facility: CLINIC | Age: 58
End: 2023-11-09
Payer: COMMERCIAL

## 2023-11-09 NOTE — TELEPHONE ENCOUNTER
[facility] Call Center    Phone Message    May a detailed message be left on voicemail: yes     Reason for Call: Other: Brittnee called she wants to schedule for another cortisone injection in her knee but is unsure how long she needs to wait before she can have that done her last injection was done in August 2023. She also would like Dr. Walden to do the injection but she is unsure if it was by ultrasound. Please contact patient to discuss     Action Taken: Other:  Orthopedic Surgery    Travel Screening: Not Applicable

## 2023-11-10 NOTE — TELEPHONE ENCOUNTER
Left detailed message for patient that steroid injections need to be at least 3 months apart.  She has passed that time since the last one so she is good to schedule.  Left ortho scheduling line for her to call and set up appt with Dr Walden for cortisone injection.    Kat Harmon MSN, RN   [facility], 11/10/2023 11:26 AM

## 2023-11-27 ENCOUNTER — OFFICE VISIT (OUTPATIENT)
Dept: ORTHOPEDICS | Facility: CLINIC | Age: 58
End: 2023-11-27
Payer: COMMERCIAL

## 2023-11-27 VITALS
BODY MASS INDEX: 30.74 KG/M2 | DIASTOLIC BLOOD PRESSURE: 80 MMHG | TEMPERATURE: 97.7 F | HEIGHT: 65 IN | WEIGHT: 184.5 LBS | SYSTOLIC BLOOD PRESSURE: 124 MMHG

## 2023-11-27 DIAGNOSIS — M17.11 PRIMARY OSTEOARTHRITIS OF RIGHT KNEE: Primary | ICD-10-CM

## 2023-11-27 PROCEDURE — 20610 DRAIN/INJ JOINT/BURSA W/O US: CPT | Mod: RT | Performed by: PHYSICIAN ASSISTANT

## 2023-11-27 RX ORDER — TRIAMCINOLONE ACETONIDE 40 MG/ML
80 INJECTION, SUSPENSION INTRA-ARTICULAR; INTRAMUSCULAR
Status: SHIPPED | OUTPATIENT
Start: 2023-11-27

## 2023-11-27 RX ORDER — BUPIVACAINE HYDROCHLORIDE 5 MG/ML
3 INJECTION, SOLUTION PERINEURAL
Status: SHIPPED | OUTPATIENT
Start: 2023-11-27

## 2023-11-27 RX ADMIN — BUPIVACAINE HYDROCHLORIDE 3 ML: 5 INJECTION, SOLUTION PERINEURAL at 15:57

## 2023-11-27 RX ADMIN — TRIAMCINOLONE ACETONIDE 80 MG: 40 INJECTION, SUSPENSION INTRA-ARTICULAR; INTRAMUSCULAR at 15:57

## 2023-11-27 ASSESSMENT — PAIN SCALES - GENERAL: PAINLEVEL: SEVERE PAIN (7)

## 2023-11-27 NOTE — PROGRESS NOTES
"Office Visit-Follow up    Chief Complaint: Brittnee Herron is a 58 year old female who is being seen for   Chief Complaint   Patient presents with    RECHECK     Osteoarthritis right knee       History of Present Illness:   Mechanism of Injury: No injury fall or trauma  Location: Right knee  Duration of Pain: A few weeks  Rating of Pain: 7 out of 10  Pain Quality: Achy  Pain is better with: Steroid injection  Pain is worse with: Activity  Treatment so far consists of: Patient 3 months  He saw Dr. Walden on 8/9/2023 and received a right knee steroid injection.  That injection lasted for 4 months.  Dr. Walden stated that the patient should do conservative nonop treatment.  Associated Features: Denies numbness or tingling shooting burning electric pain.  Pain is Limiting: Heavy use of the right knee  Here to: Orthopedic consultation  Additional History: None    REVIEW OF SYSTEMS  Review of systems negative other than positive findings in HPI.    Physical Exam:  Vitals: /80   Temp 97.7  F (36.5  C) (Temporal)   Ht 1.638 m (5' 4.5\")   Wt 83.7 kg (184 lb 8 oz)   LMP 07/10/2018   BMI 31.18 kg/m    BMI= Body mass index is 31.18 kg/m .  Constitutional: healthy, alert and no acute distress   Psychiatric: mentation appears normal and affect normal/bright  NEURO: no focal deficits, CMS intact right lower extremity   RESP: Normal with easy respirations and no use of accessory muscles to breathe, no audible wheezing or retractions  CV: Calf soft and nontender to palpation, leg warm   SKIN: No erythema, rashes, excoriation, or breakdown. No evidence of infection.   MUSCULOSKELETAL:  INSPECTION of right knee: No gross deformities, erythema, edema, ecchymosis, atrophy or fasciculations.   PALPATION: No tenderness medial, lateral, anterior and posterior portion of the knee. No specific joint line tenderness.  No prepatella bursa tenderness or pes bursa tenderness. No increased warmth.  No effusion.   ROM: Passive: " Extension full, flexion to 125 . All range of motion without catching, locking or pain.     STRENGTH: 5 out of 5 quad and hamstring without pain.   SPECIAL TEST: Patient has a negative Lachman's negative drawer sign. Patient's knee is stable to varus and valgus stress at 30  of flexion. Patient has a negative Pema's.   GAIT: non-antalgic  Lymph: no palpable lymph nodes      Diagnostic Modalities:  X-rays done on 8/9/2023 showing moderate to severe joint space narrowing on the medial and mild patellofemoral compression noted also extra-articular body anteriorly noted.    Independent visualization of the images was performed.      Impression: 1.  Right knee primary osteoarthritis    Plan:  All of the above pertinent physical exam and imaging modalities findings was reviewed with Mathew    Large Joint Injection/Arthocentesis: R knee joint    Date/Time: 11/27/2023 3:57 PM    Performed by: Armando Sharpe PA-C  Authorized by: Armando Sharpe PA-C    Indications:  Pain  Needle Size:  22 G  Guidance: landmark guided    Approach:  Anterolateral  Location:  Knee      Medications:  80 mg triamcinolone 40 MG/ML; 3 mL BUPivacaine 0.5 %  Aspirate amount (mL):  0  Procedure discussed: discussed risks, benefits, and alternatives    Consent Given by:  Patient  Timeout: timeout called immediately prior to procedure    Prep: patient was prepped and draped in usual sterile fashion     The skin was prepped with betadine. The patient was in a seated position. I used raudel chloride spray prior to doing the injection.  The patient tolerated the injection well, and there were no complications. The injection site was covered with a Band-Aid.     FOCUSED PLAN:   Patient would like another steroid injection today however she has asking a lot about having her knee replaced.  I recommended trying multiple other conservative methods such as anti-inflammatories and Mobic and/or Voltaren and therapy and information and Synvisc 1 injection  etc.  After she received her steroid injection today she was happy.  I did discuss with her if she was considering a knee replacement she has seen Dr. Walden already and he does do them.  I did let her know that he most likely will have her try more conservative measures for a while until no other treatments work and then proceed with replacement if possible.  Follow-up on an as-needed basis.    Re-x-ray on return: No      This note was dictated with [employer].    Armando Sharpe PA-C

## 2023-11-27 NOTE — LETTER
"    11/27/2023         RE: Brittnee Herron  [id number] [facility] [address]        Dear Colleague,    Thank you for referring your patient, Brittnee Herron, to the [facility]. Please see a copy of my visit note below.    Office Visit-Follow up    Chief Complaint: Brittnee Herron is a 58 year old female who is being seen for   Chief Complaint   Patient presents with     RECHECK     Osteoarthritis right knee       History of Present Illness:   Mechanism of Injury: No injury fall or trauma  Location: Right knee  Duration of Pain: A few weeks  Rating of Pain: 7 out of 10  Pain Quality: Achy  Pain is better with: Steroid injection  Pain is worse with: Activity  Treatment so far consists of: Patient 3 months  He saw Dr. Walden on 8/9/2023 and received a right knee steroid injection.  That injection lasted for 4 months.  Dr. Walden stated that the patient should do conservative nonop treatment.  Associated Features: Denies numbness or tingling shooting burning electric pain.  Pain is Limiting: Heavy use of the right knee  Here to: Orthopedic consultation  Additional History: None    REVIEW OF SYSTEMS  Review of systems negative other than positive findings in HPI.    Physical Exam:  Vitals: /80   Temp 97.7  F (36.5  C) (Temporal)   Ht 1.638 m (5' 4.5\")   Wt 83.7 kg (184 lb 8 oz)   LMP 07/10/2018   BMI 31.18 kg/m    BMI= Body mass index is 31.18 kg/m .  Constitutional: healthy, alert and no acute distress   Psychiatric: mentation appears normal and affect normal/bright  NEURO: no focal deficits, CMS intact right lower extremity   RESP: Normal with easy respirations and no use of accessory muscles to breathe, no audible wheezing or retractions  CV: Calf soft and nontender to palpation, leg warm   SKIN: No erythema, rashes, excoriation, or breakdown. No evidence of infection.   MUSCULOSKELETAL:  INSPECTION of right knee: No gross deformities, erythema, edema, ecchymosis, atrophy or " fasciculations.   PALPATION: No tenderness medial, lateral, anterior and posterior portion of the knee. No specific joint line tenderness.  No prepatella bursa tenderness or pes bursa tenderness. No increased warmth.  No effusion.   ROM: Passive: Extension full, flexion to 125 . All range of motion without catching, locking or pain.     STRENGTH: 5 out of 5 quad and hamstring without pain.   SPECIAL TEST: Patient has a negative Lachman's negative drawer sign. Patient's knee is stable to varus and valgus stress at 30  of flexion. Patient has a negative Pema's.   GAIT: non-antalgic  Lymph: no palpable lymph nodes      Diagnostic Modalities:  X-rays done on 8/9/2023 showing moderate to severe joint space narrowing on the medial and mild patellofemoral compression noted also extra-articular body anteriorly noted.    Independent visualization of the images was performed.      Impression: 1.  Right knee primary osteoarthritis    Plan:  All of the above pertinent physical exam and imaging modalities findings was reviewed with Brittnee.    Large Joint Injection/Arthocentesis: R knee joint    Date/Time: 11/27/2023 3:57 PM    Performed by: Armando Sharpe PA-C  Authorized by: Armando Sharpe PA-C    Indications:  Pain  Needle Size:  22 G  Guidance: landmark guided    Approach:  Anterolateral  Location:  Knee      Medications:  80 mg triamcinolone 40 MG/ML; 3 mL BUPivacaine 0.5 %  Aspirate amount (mL):  0  Procedure discussed: discussed risks, benefits, and alternatives    Consent Given by:  Patient  Timeout: timeout called immediately prior to procedure    Prep: patient was prepped and draped in usual sterile fashion     The skin was prepped with betadine. The patient was in a seated position. I used raudel chloride spray prior to doing the injection.  The patient tolerated the injection well, and there were no complications. The injection site was covered with a Band-Aid.     FOCUSED PLAN:   Patient would like another  steroid injection today however she has asking a lot about having her knee replaced.  I recommended trying multiple other conservative methods such as anti-inflammatories and Mobic and/or Voltaren and therapy and information and Synvisc 1 injection etc.  After she received her steroid injection today she was happy.  I did discuss with her if she was considering a knee replacement she has seen Dr. Walden already and he does do them.  I did let her know that he most likely will have her try more conservative measures for a while until no other treatments work and then proceed with replacement if possible.  Follow-up on an as-needed basis.    Re-x-ray on return: No      This note was dictated with Hello! Messenger.    Armando Sharpe PA-C                Again, thank you for allowing me to participate in the care of your patient.        Sincerely,        Armando Sharpe PA-C

## 2023-11-28 ENCOUNTER — TELEPHONE (OUTPATIENT)
Dept: FAMILY MEDICINE | Facility: CLINIC | Age: 58
End: 2023-11-28
Payer: COMMERCIAL

## 2023-11-28 NOTE — CONFIDENTIAL NOTE
Brittnee GAINES [facility] Scheduling Pool1 hour ago (12:51 PM)       Appointment Request From: Brittnee Herron     With Provider: Alexandrea Mcqueen [[facility]]     Preferred Date Range: 12/6/2023 - 12/7/2023     Preferred Times: Any Time     Reason for visit: Request an Appointment     Comments:  I need to order more med.  and talk about future  right knee surgery

## 2023-12-01 DIAGNOSIS — F41.1 GAD (GENERALIZED ANXIETY DISORDER): ICD-10-CM

## 2023-12-01 RX ORDER — VENLAFAXINE HYDROCHLORIDE 150 MG/1
150 CAPSULE, EXTENDED RELEASE ORAL DAILY
Qty: 90 CAPSULE | Refills: 0 | Status: SHIPPED | OUTPATIENT
Start: 2023-12-01 | End: 2023-12-18

## 2023-12-03 DIAGNOSIS — I10 BENIGN ESSENTIAL HYPERTENSION: ICD-10-CM

## 2023-12-04 RX ORDER — LOSARTAN POTASSIUM 25 MG/1
25 TABLET ORAL DAILY
Qty: 90 TABLET | Refills: 0 | Status: SHIPPED | OUTPATIENT
Start: 2023-12-04 | End: 2023-12-18

## 2023-12-06 ENCOUNTER — PATIENT OUTREACH (OUTPATIENT)
Dept: CARE COORDINATION | Facility: CLINIC | Age: 58
End: 2023-12-06
Payer: COMMERCIAL

## 2023-12-06 DIAGNOSIS — M17.12 PRIMARY OSTEOARTHRITIS OF LEFT KNEE: ICD-10-CM

## 2023-12-06 RX ORDER — ETODOLAC 400 MG
400 TABLET ORAL 2 TIMES DAILY
Qty: 180 TABLET | Refills: 0 | Status: SHIPPED | OUTPATIENT
Start: 2023-12-06 | End: 2024-02-29

## 2023-12-12 NOTE — PROGRESS NOTES
HISTORY OF PRESENT ILLNESS    Brittnee Herron is a 58 year old female seen for follow up of right  knee osteoarthritis    Previous knee issues: history of osteoarthritis left knee with history of left knee arthroscopy   2017    Injection(s) last time?: recent cortisone injection of her right knee on 11/27/23.  Length of effectiveness: 2-3 days.        Physical therapy: ordered last time  : ordered last time-- hasn't gotten that     Present symptoms: works on feet all day, and is having a lot of trouble at work. .    Stiffness  Pain doing stairs  Pain bending knee   swelling   Has giving-way occasionally    Night pain.  Diffuse pain     KNEE EXAM:   Mild effusion  Medial joint line tenderness   Alignment: mild varus        Xrays  8/9/23 right knee  nearly complete medial joint space narrowing    Mild lateral tilt of the patella  Possible anterior loose body     Impression:     ICD-10-CM    1. Primary osteoarthritis of right knee  M17.11          Unicompartmental right knee Osteoarthritis    Plan:  I encouraged trying an  brace, but she didn't want to try this. She is interested in arthroplasty options.  I think she may be a candidate for unicompartmental knee arthroplasty, so to better assess the other complartments, I did order an MRI.  I think she may have a loose body, and if found, we could talk further about other options besides arthroplasty (such as knee arthroscopy, with loose body removal), but I think her mind is made up about wanting replacement . She knows people at work with total knee arthroplasties who are doing great, so she may not want a partial  Return to clinic Follow up in the office / virtual visit/ MyChart for the results.      SHALINI Walden MD  Dept. Orthopedic Surgery  [facility]

## 2023-12-13 ENCOUNTER — OFFICE VISIT (OUTPATIENT)
Dept: ORTHOPEDICS | Facility: CLINIC | Age: 58
End: 2023-12-13
Payer: COMMERCIAL

## 2023-12-13 VITALS
OXYGEN SATURATION: 99 % | DIASTOLIC BLOOD PRESSURE: 80 MMHG | BODY MASS INDEX: 30.66 KG/M2 | HEIGHT: 65 IN | SYSTOLIC BLOOD PRESSURE: 127 MMHG | HEART RATE: 92 BPM | WEIGHT: 184 LBS

## 2023-12-13 DIAGNOSIS — M25.361 KNEE GIVES WAY, RIGHT: ICD-10-CM

## 2023-12-13 DIAGNOSIS — M17.11 PRIMARY OSTEOARTHRITIS OF RIGHT KNEE: Primary | ICD-10-CM

## 2023-12-13 PROCEDURE — 99213 OFFICE O/P EST LOW 20 MIN: CPT | Performed by: ORTHOPAEDIC SURGERY

## 2023-12-13 ASSESSMENT — PAIN SCALES - GENERAL: PAINLEVEL: EXTREME PAIN (8)

## 2023-12-13 NOTE — LETTER
12/13/2023         RE: Brittnee Herron  [address] [facility] [address]        Dear Colleague,    Thank you for referring your patient, Brittnee Herron, to the [facility]. Please see a copy of my visit note below.    HISTORY OF PRESENT ILLNESS    Brittnee Herron is a 58 year old female seen for follow up of right  knee osteoarthritis    Previous knee issues: history of osteoarthritis left knee with history of left knee arthroscopy   2017    Injection(s) last time?: recent cortisone injection of her right knee on 11/27/23.  Length of effectiveness: 2-3 days.        Physical therapy: ordered last time  : ordered last time-- hasn't gotten that     Present symptoms: works on feet all day, and is having a lot of trouble at work. .    Stiffness  Pain doing stairs  Pain bending knee   swelling   Has giving-way occasionally    Night pain.  Diffuse pain     KNEE EXAM:   Mild effusion  Medial joint line tenderness   Alignment: mild varus        Xrays  8/9/23 right knee  nearly complete medial joint space narrowing    Mild lateral tilt of the patella  Possible anterior loose body     Impression:     ICD-10-CM    1. Primary osteoarthritis of right knee  M17.11          Unicompartmental right knee Osteoarthritis    Plan:  I encouraged trying an  brace, but she didn't want to try this. She is interested in arthroplasty options.  I think she may be a candidate for unicompartmental knee arthroplasty, so to better assess the other complartments, I did order an MRI.  I think she may have a loose body, and if found, we could talk further about other options besides arthroplasty (such as knee arthroscopy, with loose body removal), but I think her mind is made up about wanting replacement . She knows people at work with total knee arthroplasties who are doing great, so she may not want a partial  Return to clinic Follow up in the office / virtual visit/ MyChart for the resultsJoshua LOYA  Denisa Walden MD  Dept. Orthopedic Surgery  [facility]      Again, thank you for allowing me to participate in the care of your patient.        Sincerely,        Lukas Walden MD

## 2023-12-18 ENCOUNTER — OFFICE VISIT (OUTPATIENT)
Dept: FAMILY MEDICINE | Facility: CLINIC | Age: 58
End: 2023-12-18
Payer: COMMERCIAL

## 2023-12-18 VITALS
OXYGEN SATURATION: 97 % | BODY MASS INDEX: 30.82 KG/M2 | TEMPERATURE: 98.2 F | HEART RATE: 113 BPM | RESPIRATION RATE: 20 BRPM | DIASTOLIC BLOOD PRESSURE: 80 MMHG | WEIGHT: 185 LBS | SYSTOLIC BLOOD PRESSURE: 112 MMHG | HEIGHT: 65 IN

## 2023-12-18 DIAGNOSIS — I10 BENIGN ESSENTIAL HYPERTENSION: ICD-10-CM

## 2023-12-18 DIAGNOSIS — Z13.220 LIPID SCREENING: ICD-10-CM

## 2023-12-18 DIAGNOSIS — F33.42 RECURRENT MAJOR DEPRESSIVE DISORDER, IN FULL REMISSION (H): Primary | ICD-10-CM

## 2023-12-18 DIAGNOSIS — F41.1 GAD (GENERALIZED ANXIETY DISORDER): ICD-10-CM

## 2023-12-18 LAB
ANION GAP SERPL CALCULATED.3IONS-SCNC: 12 MMOL/L (ref 7–15)
BUN SERPL-MCNC: 22.7 MG/DL (ref 8–23)
CALCIUM SERPL-MCNC: 9.8 MG/DL (ref 8.8–10.2)
CHLORIDE SERPL-SCNC: 98 MMOL/L (ref 98–107)
CREAT SERPL-MCNC: 0.85 MG/DL (ref 0.51–0.95)
DEPRECATED HCO3 PLAS-SCNC: 30 MMOL/L (ref 22–29)
EGFRCR SERPLBLD CKD-EPI 2021: 78 ML/MIN/1.73M2
GLUCOSE SERPL-MCNC: 84 MG/DL (ref 70–99)
POTASSIUM SERPL-SCNC: 3.9 MMOL/L (ref 3.4–5.3)
SODIUM SERPL-SCNC: 140 MMOL/L (ref 135–145)

## 2023-12-18 PROCEDURE — 36415 COLL VENOUS BLD VENIPUNCTURE: CPT | Performed by: NURSE PRACTITIONER

## 2023-12-18 PROCEDURE — 80048 BASIC METABOLIC PNL TOTAL CA: CPT | Performed by: NURSE PRACTITIONER

## 2023-12-18 PROCEDURE — 99214 OFFICE O/P EST MOD 30 MIN: CPT | Performed by: NURSE PRACTITIONER

## 2023-12-18 PROCEDURE — 83721 ASSAY OF BLOOD LIPOPROTEIN: CPT | Performed by: NURSE PRACTITIONER

## 2023-12-18 RX ORDER — LOSARTAN POTASSIUM 25 MG/1
25 TABLET ORAL DAILY
Qty: 90 TABLET | Refills: 3 | Status: SHIPPED | OUTPATIENT
Start: 2023-12-18

## 2023-12-18 RX ORDER — VENLAFAXINE HYDROCHLORIDE 150 MG/1
150 CAPSULE, EXTENDED RELEASE ORAL DAILY
Qty: 90 CAPSULE | Refills: 0 | Status: SHIPPED | OUTPATIENT
Start: 2023-12-18 | End: 2024-03-04

## 2023-12-18 RX ORDER — VENLAFAXINE HYDROCHLORIDE 37.5 MG/1
37.5 CAPSULE, EXTENDED RELEASE ORAL DAILY
Qty: 90 CAPSULE | Refills: 3 | Status: SHIPPED | OUTPATIENT
Start: 2023-12-18 | End: 2024-12-12

## 2023-12-18 ASSESSMENT — PATIENT HEALTH QUESTIONNAIRE - PHQ9
10. IF YOU CHECKED OFF ANY PROBLEMS, HOW DIFFICULT HAVE THESE PROBLEMS MADE IT FOR YOU TO DO YOUR WORK, TAKE CARE OF THINGS AT HOME, OR GET ALONG WITH OTHER PEOPLE: NOT DIFFICULT AT ALL
SUM OF ALL RESPONSES TO PHQ QUESTIONS 1-9: 10
SUM OF ALL RESPONSES TO PHQ QUESTIONS 1-9: 10

## 2023-12-18 ASSESSMENT — PAIN SCALES - GENERAL: PAINLEVEL: SEVERE PAIN (7)

## 2023-12-18 NOTE — PROGRESS NOTES
"  Assessment & Plan     Recurrent major depressive disorder, in full remission (H24)  PHQ-9 is 10.  Will increase effexor again.  Discussed good self care, sleep hygiene, mindfulness therapies and meditation.      LIAT (generalized anxiety disorder)  Increase effexor as above- recheck at physical in 1-2 months  - venlafaxine (EFFEXOR XR) 150 MG 24 hr capsule; Take 1 capsule (150 mg) by mouth daily  - venlafaxine (EFFEXOR XR) 37.5 MG 24 hr capsule; Take 1 capsule (37.5 mg) by mouth daily for 360 days Take with 150mg for total of 187.5mg    Benign essential hypertension  Good control- check labs  - Basic metabolic panel  (Ca, Cl, CO2, Creat, Gluc, K, Na, BUN); Future  - losartan (COZAAR) 25 MG tablet; Take 1 tablet (25 mg) by mouth daily  - Basic metabolic panel  (Ca, Cl, CO2, Creat, Gluc, K, Na, BUN)    Lipid screening  screen  - LDL cholesterol direct; Future  - LDL cholesterol direct      32 minutes spent by me on the date of the encounter doing chart review, review of outside records, review of test results, and interpretation of tests        BMI:   Estimated body mass index is 31.12 kg/m  as calculated from the following:    Height as of this encounter: 1.642 m (5' 4.65\").    Weight as of this encounter: 83.9 kg (185 lb).   Weight management plan: Discussed healthy diet and exercise guidelines        Alexandrea Mcqueen NP  [facility] ANDRÉS Beaulieu is a 58 year old, presenting for the following health issues:  Recheck Medication      History of Present Illness       Reason for visit:  Medication  refill and  health care checkup    She eats 4 or more servings of fruits and vegetables daily.She consumes 2 sweetened beverage(s) daily.She exercises with enough effort to increase her heart rate 9 or less minutes per day.  She exercises with enough effort to increase her heart rate 3 or less days per week. She is missing 1 dose(s) of medications per week.         Review of " "Systems   Constitutional, HEENT, cardiovascular, pulmonary, GI, , musculoskeletal, neuro, skin, endocrine and psych systems are negative, except as otherwise noted.      Objective    /80   Pulse 113   Temp 98.2  F (36.8  C) (Temporal)   Resp 20   Ht 1.642 m (5' 4.65\")   Wt 83.9 kg (185 lb)   LMP 07/10/2018   SpO2 97%   BMI 31.12 kg/m    Body mass index is 31.12 kg/m .  Physical Exam   GENERAL: healthy, alert and no distress  NECK: no adenopathy, no asymmetry, masses, or scars and thyroid normal to palpation  RESP: lungs clear to auscultation - no rales, rhonchi or wheezes  CV: regular rate and rhythm, normal S1 S2, no S3 or S4, no murmur, click or rub, no peripheral edema and peripheral pulses strong  ABDOMEN: soft, nontender, no hepatosplenomegaly, no masses and bowel sounds normal  MS: no gross musculoskeletal defects noted, no edema    Results for orders placed or performed in visit on 12/18/23 (from the past 24 hour(s))   Basic metabolic panel  (Ca, Cl, CO2, Creat, Gluc, K, Na, BUN)   Result Value Ref Range    Sodium 140 135 - 145 mmol/L    Potassium 3.9 3.4 - 5.3 mmol/L    Chloride 98 98 - 107 mmol/L    Carbon Dioxide (CO2) 30 (H) 22 - 29 mmol/L    Anion Gap 12 7 - 15 mmol/L    Urea Nitrogen 22.7 8.0 - 23.0 mg/dL    Creatinine 0.85 0.51 - 0.95 mg/dL    GFR Estimate 78 >60 mL/min/1.73m2    Calcium 9.8 8.8 - 10.2 mg/dL    Glucose 84 70 - 99 mg/dL   LDL cholesterol direct   Result Value Ref Range    LDL Cholesterol Direct 164 (H) <100 mg/dL                     "

## 2023-12-19 LAB — LDLC SERPL DIRECT ASSAY-MCNC: 164 MG/DL

## 2024-01-03 ENCOUNTER — HOSPITAL ENCOUNTER (OUTPATIENT)
Dept: MRI IMAGING | Facility: CLINIC | Age: 59
Discharge: HOME | End: 2024-01-03
Attending: ORTHOPAEDIC SURGERY | Admitting: ORTHOPAEDIC SURGERY
Payer: COMMERCIAL

## 2024-01-03 DIAGNOSIS — M17.11 PRIMARY OSTEOARTHRITIS OF RIGHT KNEE: ICD-10-CM

## 2024-01-03 DIAGNOSIS — M25.361 KNEE GIVES WAY, RIGHT: ICD-10-CM

## 2024-01-03 PROCEDURE — 73721 MRI JNT OF LWR EXTRE W/O DYE: CPT | Mod: 26 | Performed by: RADIOLOGY

## 2024-01-03 PROCEDURE — 73721 MRI JNT OF LWR EXTRE W/O DYE: CPT | Mod: RT

## 2024-01-08 ENCOUNTER — TELEPHONE (OUTPATIENT)
Dept: FAMILY MEDICINE | Facility: CLINIC | Age: 59
End: 2024-01-08
Payer: COMMERCIAL

## 2024-01-08 NOTE — TELEPHONE ENCOUNTER
Patient called in to see if the results of her knee MRI were in yet.     Writer read result note from Dr. Walden to patient from imaging done on 1/3/24. Result note pasted below.     Transferred patient to central scheduling to make a follow up appointment with Dr. Walden.     Ange Jules, BSN, RN          Keith Beaulieu;'  Your MRI showed mainly arthritis in the knee. We knew you had arthritis on the medial (inner) side of the knee.  It appears that you do have some areas of arthritis in other parts of the knee, but not a lot.  I think a partial knee replacement is a possibility, but may not be feasible.    I look forward to talking to you about this in further detail when you return to the office at a face-to-face visit or via virtual/phone visit.     Thanks!     --Dr. Walden   Written by Lukas Walden MD on 1/8/2024  1:17 PM CST

## 2024-01-09 NOTE — PROGRESS NOTES
"SUBJECTIVE:  Brittnee Herron returns for MRI results from 1/3/23 of the right knee, which are reviewed with the patient by myself and showed:    Impression:  1. High-grade tear of the posterior root ligament of the medial  meniscus with associated significant extrusion of the meniscus.  2. 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 Subtle associated bone marrow edema on  both sides of the joint.  3. Focal grade IV chondromalacia of the patella.  4. Grade II - III chondromalacia of the lateral compartment.  5. Moderate joint effusion.        Review of Systems:  Constitutional/General: Negative for fever, chills, change in weight  Integumentary/Skin: Negative for worrisome rashes, moles, or lesions  Neuro: Negative for weakness, dizziness, or paresthesias   Psychiatric: negative for changes in mood or affect    OBJECTIVE:  Physical Exam:  /84 (BP Location: Left arm, Patient Position: Sitting, Cuff Size: Adult Regular)   Pulse 82   Ht 1.626 m (5' 4\")   Wt 83.9 kg (185 lb)   LMP 07/10/2018   SpO2 95%   BMI 31.76 kg/m    General Appearance: healthy, alert and no distress   Skin: no suspicious lesions or rashes  Neuro: Normal strength and tone, mentation intact and speech normal  Vascular: good pulses, and cappillary refill   Lymph: no lymphadenopathy   Psych:  mentation appears normal and affect normal/bright  Resp: no increased work of breathing      ASSESSMENT:   Knee osteoarthritis, mainly in the medial compartment, but there appears to be a fair amount of chondromalacia in the patellofemoral joint and medial compartments.    PLAN:  We discussed unicompartmental knee arthroplasty, with decision intraoperatively to convert to total knee arthroplasty, vs just planning on total knee arthroplasty. She is more on the side of total knee arthroplasty, so we will go that route.     Total Knee Arthroplasty: We talked about the patient's condition and " diagnosis.  Because of severe arthritis, and failure of conservative measures, I have suggested total knee arthroplasty of the right  knee(s).  I've talked in depth about the procedure and the risks, which include, but are not limited to blood loss requiring transfusion, infection, neurovascular injury, DVT, PE, continued pain, (both perioperative and persistent post-recovery pain), numbness around the wound, instability, and potential anesthetic and perioperative medical complications, and the possibility of needing additional procedures. We also talked about recovery time, which differs from patient to patient.    Medical clearance will need to be obtained.    In addition I explained that total knee arthroplasty is considered an outpatient procedure now.  An overnight stay is allowed but the patient must be able to go home by the day after surgery.  Transitional care unit stays are no longer available for knee and hip replacement patients.  It was explained to the patient that friends and family should be recruited to help with activities of daily living at home and be available to drive the patient to physical therapy appointments.  Home health care physical therapy is not generally available.    Special considerations: Persona. Possible press fit.     Total time spent was 45 minutes; including but not limited to prep time and discussion.      SHALINI Walden MD  Dept. Orthopedic Surgery  [facility]

## 2024-01-10 ENCOUNTER — PREP FOR PROCEDURE (OUTPATIENT)
Dept: ORTHOPEDICS | Facility: CLINIC | Age: 59
End: 2024-01-10

## 2024-01-10 ENCOUNTER — OFFICE VISIT (OUTPATIENT)
Dept: ORTHOPEDICS | Facility: CLINIC | Age: 59
End: 2024-01-10
Payer: COMMERCIAL

## 2024-01-10 VITALS
DIASTOLIC BLOOD PRESSURE: 84 MMHG | HEIGHT: 64 IN | OXYGEN SATURATION: 95 % | HEART RATE: 82 BPM | WEIGHT: 185 LBS | BODY MASS INDEX: 31.58 KG/M2 | SYSTOLIC BLOOD PRESSURE: 124 MMHG

## 2024-01-10 DIAGNOSIS — M17.11 PRIMARY OSTEOARTHRITIS OF RIGHT KNEE: Primary | ICD-10-CM

## 2024-01-10 PROCEDURE — 99214 OFFICE O/P EST MOD 30 MIN: CPT | Performed by: ORTHOPAEDIC SURGERY

## 2024-01-10 ASSESSMENT — PAIN SCALES - GENERAL: PAINLEVEL: EXTREME PAIN (9)

## 2024-01-10 NOTE — LETTER
"    1/10/2024         RE: Brittnee Herron  [id number] [facility] [address]        Dear Colleague,    Thank you for referring your patient, Brittnee Herron, to the [facility]. Please see a copy of my visit note below.    SUBJECTIVE:  Brittnee Herron returns for MRI results from 1/3/23 of the right knee, which are reviewed with the patient by myself and showed:    Impression:  1. High-grade tear of the posterior root ligament of the medial  meniscus with associated significant extrusion of the meniscus.  2. 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 Subtle associated bone marrow edema on  both sides of the joint.  3. Focal grade IV chondromalacia of the patella.  4. Grade II - III chondromalacia of the lateral compartment.  5. Moderate joint effusion.        Review of Systems:  Constitutional/General: Negative for fever, chills, change in weight  Integumentary/Skin: Negative for worrisome rashes, moles, or lesions  Neuro: Negative for weakness, dizziness, or paresthesias   Psychiatric: negative for changes in mood or affect    OBJECTIVE:  Physical Exam:  /84 (BP Location: Left arm, Patient Position: Sitting, Cuff Size: Adult Regular)   Pulse 82   Ht 1.626 m (5' 4\")   Wt 83.9 kg (185 lb)   LMP 07/10/2018   SpO2 95%   BMI 31.76 kg/m    General Appearance: healthy, alert and no distress   Skin: no suspicious lesions or rashes  Neuro: Normal strength and tone, mentation intact and speech normal  Vascular: good pulses, and cappillary refill   Lymph: no lymphadenopathy   Psych:  mentation appears normal and affect normal/bright  Resp: no increased work of breathing      ASSESSMENT:   Knee osteoarthritis, mainly in the medial compartment, but there appears to be a fair amount of chondromalacia in the patellofemoral joint and medial compartments.    PLAN:  We discussed unicompartmental knee arthroplasty, with decision " intraoperatively to convert to total knee arthroplasty, vs just planning on total knee arthroplasty. She is more on the side of total knee arthroplasty, so we will go that route.     Total Knee Arthroplasty: We talked about the patient's condition and diagnosis.  Because of severe arthritis, and failure of conservative measures, I have suggested total knee arthroplasty of the right  knee(s).  I've talked in depth about the procedure and the risks, which include, but are not limited to blood loss requiring transfusion, infection, neurovascular injury, DVT, PE, continued pain, (both perioperative and persistent post-recovery pain), numbness around the wound, instability, and potential anesthetic and perioperative medical complications, and the possibility of needing additional procedures. We also talked about recovery time, which differs from patient to patient.    Medical clearance will need to be obtained.    In addition I explained that total knee arthroplasty is considered an outpatient procedure now.  An overnight stay is allowed but the patient must be able to go home by the day after surgery.  Transitional care unit stays are no longer available for knee and hip replacement patients.  It was explained to the patient that friends and family should be recruited to help with activities of daily living at home and be available to drive the patient to physical therapy appointments.  Home health care physical therapy is not generally available.    Special considerations: Persona. Possible press fit.     Total time spent was 45 minutes; including but not limited to prep time and discussion.      SHALINI Walden MD  Dept. Orthopedic Surgery  [facility]        Again, thank you for allowing me to participate in the care of your patient.        Sincerely,        Lukas Walden MD

## 2024-01-11 ENCOUNTER — HOSPITAL ENCOUNTER (OUTPATIENT)
Facility: CLINIC | Age: 59
End: 2024-01-11
Attending: ORTHOPAEDIC SURGERY | Admitting: ORTHOPAEDIC SURGERY
Payer: COMMERCIAL

## 2024-01-11 ENCOUNTER — TELEPHONE (OUTPATIENT)
Dept: ORTHOPEDICS | Facility: CLINIC | Age: 59
End: 2024-01-11
Payer: COMMERCIAL

## 2024-01-11 NOTE — TELEPHONE ENCOUNTER
Type of surgery: RIGHT TOTAL KNEE ARTHOPLASTY (Right)   Location of surgery: [facility]  Date and time of surgery: 3-11-24  Surgeon: Win  Pre-Op Appt Date: 2-29-24  Post-Op Appt Date: 3-27-24   Packet sent out: Yes  Pre-cert/Authorization completed:    Date:

## 2024-01-17 ENCOUNTER — APPOINTMENT (OUTPATIENT)
Dept: GENERAL RADIOLOGY | Facility: CLINIC | Age: 59
End: 2024-01-17
Attending: STUDENT IN AN ORGANIZED HEALTH CARE EDUCATION/TRAINING PROGRAM
Payer: COMMERCIAL

## 2024-01-17 ENCOUNTER — HOSPITAL ENCOUNTER (EMERGENCY)
Facility: CLINIC | Age: 59
Discharge: HOME | End: 2024-01-17
Attending: STUDENT IN AN ORGANIZED HEALTH CARE EDUCATION/TRAINING PROGRAM | Admitting: STUDENT IN AN ORGANIZED HEALTH CARE EDUCATION/TRAINING PROGRAM
Payer: COMMERCIAL

## 2024-01-17 VITALS
HEART RATE: 70 BPM | BODY MASS INDEX: 32.44 KG/M2 | WEIGHT: 189 LBS | DIASTOLIC BLOOD PRESSURE: 87 MMHG | TEMPERATURE: 97.5 F | OXYGEN SATURATION: 96 % | RESPIRATION RATE: 18 BRPM | SYSTOLIC BLOOD PRESSURE: 119 MMHG

## 2024-01-17 DIAGNOSIS — S43.005A SHOULDER DISLOCATION, LEFT, INITIAL ENCOUNTER: Primary | ICD-10-CM

## 2024-01-17 DIAGNOSIS — S42.292A HILL SACHS DEFORMITY, LEFT: ICD-10-CM

## 2024-01-17 DIAGNOSIS — S43.432A BANKART LESION OF LEFT SHOULDER, INITIAL ENCOUNTER: ICD-10-CM

## 2024-01-17 PROCEDURE — 23650 CLTX SHO DSLC W/MNPJ WO ANES: CPT | Mod: LT | Performed by: STUDENT IN AN ORGANIZED HEALTH CARE EDUCATION/TRAINING PROGRAM

## 2024-01-17 PROCEDURE — 99284 EMERGENCY DEPT VISIT MOD MDM: CPT | Mod: GC | Performed by: STUDENT IN AN ORGANIZED HEALTH CARE EDUCATION/TRAINING PROGRAM

## 2024-01-17 PROCEDURE — 250N000011 HC RX IP 250 OP 636: Performed by: STUDENT IN AN ORGANIZED HEALTH CARE EDUCATION/TRAINING PROGRAM

## 2024-01-17 PROCEDURE — 99284 EMERGENCY DEPT VISIT MOD MDM: CPT | Mod: 25 | Performed by: STUDENT IN AN ORGANIZED HEALTH CARE EDUCATION/TRAINING PROGRAM

## 2024-01-17 PROCEDURE — 96374 THER/PROPH/DIAG INJ IV PUSH: CPT | Performed by: STUDENT IN AN ORGANIZED HEALTH CARE EDUCATION/TRAINING PROGRAM

## 2024-01-17 PROCEDURE — 73030 X-RAY EXAM OF SHOULDER: CPT | Mod: LT

## 2024-01-17 PROCEDURE — 999N000065 XR SHOULDER LEFT 2 VIEWS: Mod: LT

## 2024-01-17 RX ORDER — ONDANSETRON 2 MG/ML
4 INJECTION INTRAMUSCULAR; INTRAVENOUS EVERY 30 MIN PRN
Status: DISCONTINUED | OUTPATIENT
Start: 2024-01-17 | End: 2024-01-17 | Stop reason: HOSPADM

## 2024-01-17 RX ORDER — MORPHINE SULFATE 4 MG/ML
4 INJECTION, SOLUTION INTRAMUSCULAR; INTRAVENOUS ONCE
Status: DISCONTINUED | OUTPATIENT
Start: 2024-01-17 | End: 2024-01-17 | Stop reason: HOSPADM

## 2024-01-17 RX ORDER — HYDROCODONE BITARTRATE AND ACETAMINOPHEN 5; 325 MG/1; MG/1
1 TABLET ORAL EVERY 6 HOURS PRN
Qty: 10 TABLET | Refills: 0 | Status: SHIPPED | OUTPATIENT
Start: 2024-01-17 | End: 2024-01-20

## 2024-01-17 RX ORDER — LORAZEPAM 2 MG/ML
1 INJECTION INTRAMUSCULAR ONCE
Status: COMPLETED | OUTPATIENT
Start: 2024-01-17 | End: 2024-01-17

## 2024-01-17 RX ADMIN — LORAZEPAM 1 MG: 2 INJECTION INTRAMUSCULAR; INTRAVENOUS at 17:28

## 2024-01-17 ASSESSMENT — ACTIVITIES OF DAILY LIVING (ADL): ADLS_ACUITY_SCORE: 35

## 2024-01-17 NOTE — ED TRIAGE NOTES
Patient slipped on the ice, landing on left shoulder. She was brought in by EMS. She has had 225 mcg fentanyl PTA.      Triage Assessment (Adult)       Row Name 01/17/24 1019          Triage Assessment    Airway WDL WDL        Respiratory WDL    Respiratory WDL WDL        Skin Circulation/Temperature WDL    Skin Circulation/Temperature WDL WDL        Cardiac WDL    Cardiac WDL WDL        Peripheral/Neurovascular WDL    Peripheral Neurovascular WDL WDL        Cognitive/Neuro/Behavioral WDL    Cognitive/Neuro/Behavioral WDL WDL

## 2024-01-17 NOTE — ED PROVIDER NOTES
History     Chief Complaint   Patient presents with    Fall     HPI  Brittnee Herron is a 58 year old female who presents to the [place] emergency room for evaluation of fall injury.  Patient reports that she was walking outside when she slipped on the ice and fell landing on her left side.  Patient endorsed severe pain in her left shoulder after the fall.  She does not remember the events fully but does not think she landed on an outstretched arm.  She did not lose consciousness or hit her head.  No other injuries.  She was seen in the urgent care and given Toradol and Zofran but given severe pain EMS was called and brought her to the [place] emergency room.  EMS gave her 225 mcg of fentanyl en route.  On presentation she reports that her pain is significantly improved.  She denies any numbness, tingling, weakness.  No obvious deformity.  No overlying skin changes.  She has no previous history of any orthopedic surgeries or fractures.    Allergies:  Allergies   Allergen Reactions    Lisinopril Cough       Problem List:    Patient Active Problem List    Diagnosis Date Noted    Essential hypertension 02/19/2021     Priority: Medium    Recurrent major depressive disorder, in full remission (H24) 02/18/2021     Priority: Medium    LIAT (generalized anxiety disorder) 07/18/2018     Priority: Medium    Primary osteoarthritis of left knee 07/31/2017     Priority: Medium        Past Medical History:    Past Medical History:   Diagnosis Date    Depressive disorder, not elsewhere classified 1982       Past Surgical History:    Past Surgical History:   Procedure Laterality Date    ARTHROSCOPY KNEE WITH MEDIAL MENISCECTOMY Left 7/18/2017    Procedure: ARTHROSCOPY KNEE WITH MEDIAL MENISCECTOMY;  left knee arthroscopy with partial medial menisectomy;  Surgeon: Brittnee Bean MD;  Location: [facility]    COLONOSCOPY N/A 4/12/2023    Procedure: COLONOSCOPY;  Surgeon: Armando Saunders MD;  Location:  GI    HC REMOVAL OF  OVARIAN CYST(S)  1990    laparoscopic    HC REMOVE TONSILS/ADENOIDS,<11 Y/O      ZZC LIGATE FALLOPIAN TUBE,POSTPARTUM  08/03/2002    Postpartum bilateral tubal ligation with partial salpingectomy through a mini laparotomy       Family History:    Family History   Problem Relation Age of Onset    Arthritis Mother     Hypertension Mother     Alcohol/Drug Maternal Grandmother         alcohol    Alcohol/Drug Maternal Grandfather         alcohol    Diabetes Maternal Grandfather         adult-onset    Depression Brother        Social History:  Marital Status:   [2]  Social History     Tobacco Use    Smoking status: Never    Smokeless tobacco: Never   Vaping Use    Vaping Use: Never used   Substance Use Topics    Alcohol use: No    Drug use: No        Medications:    chlorthalidone (HYGROTON) 25 MG tablet  etodolac (LODINE) 400 MG tablet  losartan (COZAAR) 25 MG tablet  ondansetron (ZOFRAN-ODT) 4 MG ODT tab  venlafaxine (EFFEXOR XR) 150 MG 24 hr capsule  venlafaxine (EFFEXOR XR) 37.5 MG 24 hr capsule          Review of Systems   All other systems reviewed and are negative.      Physical Exam   BP: (!) 128/92  Pulse: 81  Temp: 97.5  F (36.4  C)  Resp: 18  Weight: 85.7 kg (189 lb)  SpO2: 98 %      Physical Exam  Vitals and nursing note reviewed.   Constitutional:       Appearance: Normal appearance.   HENT:      Head: Atraumatic.   Cardiovascular:      Rate and Rhythm: Normal rate and regular rhythm.   Musculoskeletal:         General: Tenderness present. No swelling, deformity or signs of injury. Normal range of motion.      Comments: Tenderness to palpation patient's left mid humeral area.  Step-off deformity appreciated on patient's left shoulder.  No tenderness in the shoulder or clavicle area.  No obvious deformities.  No overlying skin changes.  Neurovascular intact.   Skin:     General: Skin is warm.   Neurological:      General: No focal deficit present.      Mental Status: She is alert.   Psychiatric:          Mood and Affect: Mood normal.         Behavior: Behavior normal.         ED Course     Brittnee is a 58-year-old female who was seen evaluated in the [place] emergency room for fall injury.  On presentation, she is hypertensive but otherwise hemodynamically stable.  On exam, she does have a step-off deformity of the left shoulder concerning for anterior shoulder dislocation.  No obvious deformities or overlying skin changes.  She was neurovascularly intact.  X-ray confirmed a anterior shoulder dislocation.  No fractures appreciated.  Patient's shoulder was able to be reduced via Kocher's method.  Patient had received 225 mcg of fentanyl prior to presentation and had minimal pain.  For some anxiety she was given a 1 mg dose of Ativan.  Patient tolerated the procedure well without significant pain.  Repeat x-ray showed correct placement of shoulder.  No other injuries appreciated.  Patient was safe to discharge.  Patient was given a short prescription for pain management and put in a sling with plans for her to follow-up with orthopedic surgery in 1 week.    Results for orders placed or performed during the hospital encounter of 01/17/24 (from the past 24 hour(s))   XR Shoulder Left G/E 3 Views    Narrative    EXAM: XR SHOULDER LEFT G/E 3 VIEWS  LOCATION: [facility]  DATE: 1/17/2024    INDICATION: fall on shoulder  COMPARISON: None.      Impression    IMPRESSION: Anterior glenohumeral dislocation.     NOTE: ABNORMAL REPORT    THE DICTATION ABOVE DESCRIBES AN ABNORMALITY FOR WHICH FOLLOW-UP IS NEEDED.    XR Shoulder Left 2 Views    Narrative    EXAM: XR SHOULDER LEFT 2 VIEWS  LOCATION: [facility]  DATE: 1/17/2024    INDICATION: post reduction  COMPARISON: None.      Impression    IMPRESSION: The previously seen anterior shoulder dislocation has been successfully reduced. There is some lucency within the anteroinferior glenoid concerning for a minimally  displaced bony Bankart fracture. This could be confirmed with MRI. There is   also a Hill-Sachs impaction fracture of the posterosuperior humeral head.       Medications   ondansetron (ZOFRAN) injection 4 mg (has no administration in time range)   morphine (PF) injection 4 mg (has no administration in time range)   LORazepam (ATIVAN) injection 1 mg (1 mg Intravenous $Given 1/17/24 9070)       Assessments & Plan (with Medical Decision Making)     I have reviewed the nursing notes.    I have reviewed the findings, diagnosis, plan and need for follow up with the patient.    Medical Decision Making  The patient's presentation was of straightforward complexity (a clearly self-limited or minor problem).    The patient's evaluation involved:  ordering and/or review of 1 test(s) in this encounter (see separate area of note for details)    The patient's management necessitated moderate risk (prescription drug management including medications given in the ED).      New Prescriptions    No medications on file       Final diagnoses:   Shoulder dislocation, left, initial encounter   Bankart lesion of left shoulder, initial encounter   Hill Sachs deformity, left       1/17/2024   [facility] EMERGENCY DEPT    Jose Alejandro Rivera  [facility] Resident PGY3    Patient care was discussed with attending Dr. Henson who agrees with assessment and plan.        Jose Alejandro Rivera MD  01/17/24 [id number]

## 2024-01-17 NOTE — LETTER
January 18, 2024      To Whom It May Concern:      Brittnee Herron was seen in our Emergency Department, 01/17/24.  She will need clinic follow-up, currently scheduled 1/24/2024.  Please excuse her from work until she has been cleared by the follow-up provider.    Sincerely,        Dora Herman MD

## 2024-01-18 NOTE — DISCHARGE INSTRUCTIONS
It seems suspicious that he may have had some fractures to the area of the joint to help stabilize the shoulder.  Please keep your arm in the sling until you can be seen by orthopedics in a week.  Please control pain with ibuprofen Tylenol and reduce movement at this time.  Please return if you have any new symptoms or concerns.

## 2024-01-24 ENCOUNTER — OFFICE VISIT (OUTPATIENT)
Dept: ORTHOPEDICS | Facility: CLINIC | Age: 59
End: 2024-01-24
Attending: STUDENT IN AN ORGANIZED HEALTH CARE EDUCATION/TRAINING PROGRAM
Payer: COMMERCIAL

## 2024-01-24 VITALS
RESPIRATION RATE: 18 BRPM | HEIGHT: 65 IN | DIASTOLIC BLOOD PRESSURE: 94 MMHG | BODY MASS INDEX: 31.49 KG/M2 | HEART RATE: 98 BPM | SYSTOLIC BLOOD PRESSURE: 133 MMHG | WEIGHT: 189 LBS

## 2024-01-24 DIAGNOSIS — S42.292A HILL SACHS DEFORMITY, LEFT: ICD-10-CM

## 2024-01-24 DIAGNOSIS — S43.005A SHOULDER DISLOCATION, LEFT, INITIAL ENCOUNTER: ICD-10-CM

## 2024-01-24 DIAGNOSIS — S43.432A BANKART LESION OF LEFT SHOULDER, INITIAL ENCOUNTER: ICD-10-CM

## 2024-01-24 PROCEDURE — 99213 OFFICE O/P EST LOW 20 MIN: CPT | Performed by: ORTHOPAEDIC SURGERY

## 2024-01-24 ASSESSMENT — PAIN SCALES - GENERAL: PAINLEVEL: MILD PAIN (3)

## 2024-01-24 NOTE — LETTER
1/24/2024         RE: Brittnee Hreron  [id number] [facility] [address]        Dear Colleague,    Thank you for referring your patient, Brittnee Herron, to the [facility]. Please see a copy of my visit note below.    S:  Fell sustaining L shoulder dislocation, first event.  Reduced in ER and now in sling.         Patient Active Problem List   Diagnosis     Primary osteoarthritis of left knee     LIAT (generalized anxiety disorder)     Recurrent major depressive disorder, in full remission (H24)     Essential hypertension            Past Medical History:   Diagnosis Date     Depressive disorder, not elsewhere classified 1982    off meds for a year            Past Surgical History:   Procedure Laterality Date     ARTHROSCOPY KNEE WITH MEDIAL MENISCECTOMY Left 7/18/2017    Procedure: ARTHROSCOPY KNEE WITH MEDIAL MENISCECTOMY;  left knee arthroscopy with partial medial menisectomy;  Surgeon: Brittnee Bean MD;  [facility]     COLONOSCOPY N/A 4/12/2023    Procedure: COLONOSCOPY;  Surgeon: Armando Saunders MD;  Location: [facility] GI     HC REMOVAL OF OVARIAN CYST(S)  1990    laparoscopic     HC REMOVE TONSILS/ADENOIDS,<13 Y/O       ZZC LIGATE FALLOPIAN TUBE,POSTPARTUM  08/03/2002    Postpartum bilateral tubal ligation with partial salpingectomy through a mini laparotomy            Social History     Tobacco Use     Smoking status: Never     Smokeless tobacco: Never   Substance Use Topics     Alcohol use: No            Family History   Problem Relation Age of Onset     Arthritis Mother      Hypertension Mother      Alcohol/Drug Maternal Grandmother         alcohol     Alcohol/Drug Maternal Grandfather         alcohol     Diabetes Maternal Grandfather         adult-onset     Depression Brother                Allergies   Allergen Reactions     Lisinopril Cough            Current Outpatient Medications   Medication Sig Dispense Refill     chlorthalidone (HYGROTON) 25 MG tablet Take 1 tablet  (25 mg) by mouth daily for 90 days 90 tablet 3     etodolac (LODINE) 400 MG tablet Take 1 tablet (400 mg) by mouth 2 times daily 180 tablet 0     losartan (COZAAR) 25 MG tablet Take 1 tablet (25 mg) by mouth daily 90 tablet 3     ondansetron (ZOFRAN-ODT) 4 MG ODT tab Take 1 tablet (4 mg) by mouth every 8 hours as needed for nausea 30 tablet 1     venlafaxine (EFFEXOR XR) 150 MG 24 hr capsule Take 1 capsule (150 mg) by mouth daily 90 capsule 0     venlafaxine (EFFEXOR XR) 37.5 MG 24 hr capsule Take 1 capsule (37.5 mg) by mouth daily for 360 days Take with 150mg for total of 187.5mg 90 capsule 3          Review Of Systems  Skin: negative  Eyes: negative  Ears/Nose/Throat: negative  Respiratory: No shortness of breath, dyspnea on exertion, cough, or hemoptysis    O: Physical Exam:  CMS intact to L hand.  Adequate elbow motion.    Lab:non contributory    Images:  Narrative & Impression   EXAM: XR SHOULDER LEFT G/E 3 VIEWS  LOCATION: [facility]  DATE: 1/17/2024     INDICATION: fall on shoulder  COMPARISON: None.                                                                      IMPRESSION: Anterior glenohumeral dislocation.      Narrative & Impression   EXAM: XR SHOULDER LEFT 2 VIEWS  LOCATION: [facility]  DATE: 1/17/2024     INDICATION: post reduction  COMPARISON: None.                                                                      IMPRESSION: The previously seen anterior shoulder dislocation has been successfully reduced. There is some lucency within the anteroinferior glenoid concerning for a minimally displaced bony Bankart fracture. This could be confirmed with MRI. 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 This included the questioning of Brittnee for any medication problems. We will continue the current treatment plan for these active problems except as noted.      Again, thank you for allowing me to participate in the care of your patient.        Sincerely,        Armando Brooks MD

## 2024-01-24 NOTE — LETTER
January 24, 2024      Brittnee Herron  [id number] [facility] [address]        To Whom It May Concern:    Brittnee Herron was seen in our clinic for the left shoulder.     Restrictions:  No pushing, pulling, or reaching away from the body, no overhead reaching with the left arm  Patient may use arm sling    Workability will be  reassessed in 1 month    Sincerely,        Dr. Brooks

## 2024-01-24 NOTE — PROGRESS NOTES
S:  Fell sustaining L shoulder dislocation, first event.  Reduced in ER and now in sling.         Patient Active Problem List   Diagnosis    Primary osteoarthritis of left knee    LIAT (generalized anxiety disorder)    Recurrent major depressive disorder, in full remission (H24)    Essential hypertension            Past Medical History:   Diagnosis Date    Depressive disorder, not elsewhere classified 1982    off meds for a year            Past Surgical History:   Procedure Laterality Date    ARTHROSCOPY KNEE WITH MEDIAL MENISCECTOMY Left 7/18/2017    Procedure: ARTHROSCOPY KNEE WITH MEDIAL MENISCECTOMY;  left knee arthroscopy with partial medial menisectomy;  Surgeon: Brittnee Bean MD;  Location: [facility]    COLONOSCOPY N/A 4/12/2023    Procedure: COLONOSCOPY;  Surgeon: Armando Saunders MD;  Location: [facility]    HC REMOVAL OF OVARIAN CYST(S)  1990    laparoscopic    HC REMOVE TONSILS/ADENOIDS,<11 Y/O      ZZC LIGATE FALLOPIAN TUBE,POSTPARTUM  08/03/2002    Postpartum bilateral tubal ligation with partial salpingectomy through a mini laparotomy            Social History     Tobacco Use    Smoking status: Never    Smokeless tobacco: Never   Substance Use Topics    Alcohol use: No            Family History   Problem Relation Age of Onset    Arthritis Mother     Hypertension Mother     Alcohol/Drug Maternal Grandmother         alcohol    Alcohol/Drug Maternal Grandfather         alcohol    Diabetes Maternal Grandfather         adult-onset    Depression Brother                Allergies   Allergen Reactions    Lisinopril Cough            Current Outpatient Medications   Medication Sig Dispense Refill    chlorthalidone (HYGROTON) 25 MG tablet Take 1 tablet (25 mg) by mouth daily for 90 days 90 tablet 3    etodolac (LODINE) 400 MG tablet Take 1 tablet (400 mg) by mouth 2 times daily 180 tablet 0    losartan (COZAAR) 25 MG tablet Take 1 tablet (25 mg) by mouth daily 90 tablet 3    ondansetron (ZOFRAN-ODT) 4 MG ODT tab Take  1 tablet (4 mg) by mouth every 8 hours as needed for nausea 30 tablet 1    venlafaxine (EFFEXOR XR) 150 MG 24 hr capsule Take 1 capsule (150 mg) by mouth daily 90 capsule 0    venlafaxine (EFFEXOR XR) 37.5 MG 24 hr capsule Take 1 capsule (37.5 mg) by mouth daily for 360 days Take with 150mg for total of 187.5mg 90 capsule 3          Review Of Systems  Skin: negative  Eyes: negative  Ears/Nose/Throat: negative  Respiratory: No shortness of breath, dyspnea on exertion, cough, or hemoptysis    O: Physical Exam:  CMS intact to L hand.  Adequate elbow motion.    Lab:non contributory    Images:  Narrative & Impression   EXAM: XR SHOULDER LEFT G/E 3 VIEWS  LOCATION: [facility]  DATE: 1/17/2024     INDICATION: fall on shoulder  COMPARISON: None.                                                                      IMPRESSION: Anterior glenohumeral dislocation.      Narrative & Impression   EXAM: XR SHOULDER LEFT 2 VIEWS  LOCATION: [facility]  DATE: 1/17/2024     INDICATION: post reduction  COMPARISON: None.                                                                      IMPRESSION: The previously seen anterior shoulder dislocation has been successfully reduced. There is some lucency within the anteroinferior glenoid concerning for a minimally displaced bony Bankart fracture. This could be confirmed with MRI. 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 This included the questioning of Brittnee for any medication problems. We will  continue the current treatment plan for these active problems except as noted.

## 2024-01-31 ENCOUNTER — TELEPHONE (OUTPATIENT)
Dept: ADMISSION | Facility: CLINIC | Age: 59
End: 2024-01-31
Payer: COMMERCIAL

## 2024-01-31 NOTE — TELEPHONE ENCOUNTER
Reason for Call:  Form, our goal is to have forms completed with 72 hours, however, some forms may require a visit or additional information.    Type of letter, form or note:  FMLA    Who is the form from?:  Employer HR Department (if other please explain)    Where did the form come from: Patient or family brought in       What clinic location was the form placed at?: [facility]    Where the form was placed:  Dr Brooks's  Box/Folder    What number is listed as a contact on the form?:        Additional comments: Please complete form, fax to [employer] - [phone] and return original to patient in envelope supplied. Patient is aware Dr Brooks is not in clinic until 2/1/24 and forms can take up to 72 hours for completion. Patient is requesting form be completed as soon as possible.    Call taken on 1/31/2024 at 12:48 PM by Laney Martin

## 2024-01-31 NOTE — TELEPHONE ENCOUNTER
Per note from 1/24/24, patient can return to work with restrictions. Called and spoke with patient's HR department and was advised that restrictions cannot be accommodated and patient will need to be out of work completely.     Sharifa Rodriguez RN   [facility]

## 2024-01-31 NOTE — TELEPHONE ENCOUNTER
"Patient's form was filled out, reviewed, and signed by provider.      Form was faxed to the designated fax number: [phone]    A copy was sent to scanning.     A copy was placed in the lower level \"faxed\" file/drawer.      Patient was informed and a copy was mailed to her home address per her request.     Sharifa Rodriguez RN   [facility]  "

## 2024-02-21 ENCOUNTER — OFFICE VISIT (OUTPATIENT)
Dept: ORTHOPEDICS | Facility: CLINIC | Age: 59
End: 2024-02-21
Payer: COMMERCIAL

## 2024-02-21 VITALS
HEIGHT: 65 IN | TEMPERATURE: 98.3 F | DIASTOLIC BLOOD PRESSURE: 78 MMHG | SYSTOLIC BLOOD PRESSURE: 118 MMHG | BODY MASS INDEX: 31.49 KG/M2 | WEIGHT: 189 LBS

## 2024-02-21 DIAGNOSIS — S43.005A SHOULDER DISLOCATION, LEFT, INITIAL ENCOUNTER: Primary | ICD-10-CM

## 2024-02-21 PROCEDURE — 99213 OFFICE O/P EST LOW 20 MIN: CPT | Performed by: ORTHOPAEDIC SURGERY

## 2024-02-21 ASSESSMENT — PAIN SCALES - GENERAL: PAINLEVEL: NO PAIN (0)

## 2024-02-21 NOTE — LETTER
February 21, 2024      Brittnee Herron  [id number] [facility] [address]        To Whom It May Concern:    Brittnee Herron was seen in our clinic. She may return to work without restrictions.      Sincerely,        Armando Brooks MD

## 2024-02-21 NOTE — LETTER
2/21/2024         RE: Brittnee Herron  [id number] [facility] [address]        Dear Colleague,    Thank you for referring your patient, Brittnee Herron, to the [facility]. Please see a copy of my visit note below.    S:  Doing much better, has been consistent with limited shoulder activity LUE and has been using sling.         Patient Active Problem List   Diagnosis     Primary osteoarthritis of left knee     LIAT (generalized anxiety disorder)     Recurrent major depressive disorder, in full remission (H24)     Essential hypertension            Past Medical History:   Diagnosis Date     Depressive disorder, not elsewhere classified 1982    off meds for a year            Past Surgical History:   Procedure Laterality Date     ARTHROSCOPY KNEE WITH MEDIAL MENISCECTOMY Left 7/18/2017    Procedure: ARTHROSCOPY KNEE WITH MEDIAL MENISCECTOMY;  left knee arthroscopy with partial medial menisectomy;  Surgeon: Brittnee Bean MD;  Location[facility]     COLONOSCOPY N/A 4/12/2023    Procedure: COLONOSCOPY;  Surgeon: Armando Saunders MD;  Location: [facility]     HC REMOVAL OF OVARIAN CYST(S)  1990    laparoscopic     HC REMOVE TONSILS/ADENOIDS,<13 Y/O       ZZC LIGATE FALLOPIAN TUBE,POSTPARTUM  08/03/2002    Postpartum bilateral tubal ligation with partial salpingectomy through a mini laparotomy            Social History     Tobacco Use     Smoking status: Never     Smokeless tobacco: Never   Substance Use Topics     Alcohol use: No            Family History   Problem Relation Age of Onset     Arthritis Mother      Hypertension Mother      Alcohol/Drug Maternal Grandmother         alcohol     Alcohol/Drug Maternal Grandfather         alcohol     Diabetes Maternal Grandfather         adult-onset     Depression Brother                Allergies   Allergen Reactions     Lisinopril Cough            Current Outpatient Medications   Medication Sig Dispense Refill     etodolac (LODINE) 400 MG tablet Take 1  tablet (400 mg) by mouth 2 times daily 180 tablet 0     losartan (COZAAR) 25 MG tablet Take 1 tablet (25 mg) by mouth daily 90 tablet 3     ondansetron (ZOFRAN-ODT) 4 MG ODT tab Take 1 tablet (4 mg) by mouth every 8 hours as needed for nausea 30 tablet 1     venlafaxine (EFFEXOR XR) 150 MG 24 hr capsule Take 1 capsule (150 mg) by mouth daily 90 capsule 0     venlafaxine (EFFEXOR XR) 37.5 MG 24 hr capsule Take 1 capsule (37.5 mg) by mouth daily for 360 days Take with 150mg for total of 187.5mg 90 capsule 3     chlorthalidone (HYGROTON) 25 MG tablet Take 1 tablet (25 mg) by mouth daily for 90 days 90 tablet 3          Review Of Systems  Skin: negative  Eyes: negative  Ears/Nose/Throat: negative  Respiratory: No shortness of breath, dyspnea on exertion, cough, or hemoptysis    O: Physical Exam:  Can initiate abduction LUE as well as IR/ER against a force without issue.  Active abduction about 90 degrees and same with flexion.  CMS intact to each upper extremity.    Lab:non contributory    Images:     EXAM: XR SHOULDER LEFT 2 VIEWS  LOCATION: [facility]  DATE: 1/17/2024     INDICATION: post reduction  COMPARISON: None.                                                                      IMPRESSION: The previously seen anterior shoulder dislocation has been successfully reduced. There is some lucency within the anteroinferior glenoid concerning for a minimally displaced bony Bankart fracture. This could be confirmed with MRI. There is   also a Hill-Sachs impaction fracture of the posterosuperior humeral head.    A:  L shoulder dislocation which has been reduced with bankart and hill-sachs evidence      P:  Increase activity   Discontinue sling  See back 2-3 mos and if still symptomatic consider MRI scan  Discussed PT and she would like to do this on her own  We provided shoulder stabilization exercises  Notify if exacerbation symptoms             In addition to the above assessment and  plan each active problem on Brittnee's problem list was evaluated today. This included the questioning of Brittnee for any medication problems. We will continue the current treatment plan for these active problems except as noted.    Again, thank you for allowing me to participate in the care of your patient.        Sincerely,        Armando Brooks MD

## 2024-02-21 NOTE — PROGRESS NOTES
S:  Doing much better, has been consistent with limited shoulder activity LUE and has been using sling.         Patient Active Problem List   Diagnosis    Primary osteoarthritis of left knee    LIAT (generalized anxiety disorder)    Recurrent major depressive disorder, in full remission (H24)    Essential hypertension            Past Medical History:   Diagnosis Date    Depressive disorder, not elsewhere classified 1982    off meds for a year            Past Surgical History:   Procedure Laterality Date    ARTHROSCOPY KNEE WITH MEDIAL MENISCECTOMY Left 7/18/2017    Procedure: ARTHROSCOPY KNEE WITH MEDIAL MENISCECTOMY;  left knee arthroscopy with partial medial menisectomy;  Surgeon: Brittnee Bean MD;  Location: [facility]    COLONOSCOPY N/A 4/12/2023    Procedure: COLONOSCOPY;  Surgeon: Armando Saunders MD;  Location: [facility]    HC REMOVAL OF OVARIAN CYST(S)  1990    laparoscopic    HC REMOVE TONSILS/ADENOIDS,<11 Y/O      ZZC LIGATE FALLOPIAN TUBE,POSTPARTUM  08/03/2002    Postpartum bilateral tubal ligation with partial salpingectomy through a mini laparotomy            Social History     Tobacco Use    Smoking status: Never    Smokeless tobacco: Never   Substance Use Topics    Alcohol use: No            Family History   Problem Relation Age of Onset    Arthritis Mother     Hypertension Mother     Alcohol/Drug Maternal Grandmother         alcohol    Alcohol/Drug Maternal Grandfather         alcohol    Diabetes Maternal Grandfather         adult-onset    Depression Brother                Allergies   Allergen Reactions    Lisinopril Cough            Current Outpatient Medications   Medication Sig Dispense Refill    etodolac (LODINE) 400 MG tablet Take 1 tablet (400 mg) by mouth 2 times daily 180 tablet 0    losartan (COZAAR) 25 MG tablet Take 1 tablet (25 mg) by mouth daily 90 tablet 3    ondansetron (ZOFRAN-ODT) 4 MG ODT tab Take 1 tablet (4 mg) by mouth every 8 hours as needed for nausea 30 tablet 1    venlafaxine  (EFFEXOR XR) 150 MG 24 hr capsule Take 1 capsule (150 mg) by mouth daily 90 capsule 0    venlafaxine (EFFEXOR XR) 37.5 MG 24 hr capsule Take 1 capsule (37.5 mg) by mouth daily for 360 days Take with 150mg for total of 187.5mg 90 capsule 3    chlorthalidone (HYGROTON) 25 MG tablet Take 1 tablet (25 mg) by mouth daily for 90 days 90 tablet 3          Review Of Systems  Skin: negative  Eyes: negative  Ears/Nose/Throat: negative  Respiratory: No shortness of breath, dyspnea on exertion, cough, or hemoptysis    O: Physical Exam:  Can initiate abduction LUE as well as IR/ER against a force without issue.  Active abduction about 90 degrees and same with flexion.  CMS intact to each upper extremity.    Lab:non contributory    Images:     EXAM: XR SHOULDER LEFT 2 VIEWS  LOCATION: [facility]  DATE: 1/17/2024     INDICATION: post reduction  COMPARISON: None.                                                                      IMPRESSION: The previously seen anterior shoulder dislocation has been successfully reduced. There is some lucency within the anteroinferior glenoid concerning for a minimally displaced bony Bankart fracture. This could be confirmed with MRI. There is   also a Hill-Sachs impaction fracture of the posterosuperior humeral head.    A:  L shoulder dislocation which has been reduced with bankart and hill-sachs evidence      P:  Increase activity   Discontinue sling  See back 2-3 mos and if still symptomatic consider MRI scan  Discussed PT and she would like to do this on her own  We provided shoulder stabilization exercises  Notify if exacerbation symptoms             In addition to the above assessment and plan each active problem on Brittnee's problem list was evaluated today. This included the questioning of Brittnee for any medication problems. We will continue the current treatment plan for these active problems except as noted.

## 2024-02-26 ENCOUNTER — TELEPHONE (OUTPATIENT)
Dept: FAMILY MEDICINE | Facility: CLINIC | Age: 59
End: 2024-02-26
Payer: COMMERCIAL

## 2024-02-26 NOTE — TELEPHONE ENCOUNTER
Patient calling to cancel appointment on 2/29 due to being unable to have surgery. Updated Alexandrea Mcqueen on status.     Patient will call back when she needs appointment rescheduled.    SARAI HernandezN, RN

## 2024-02-29 DIAGNOSIS — M17.12 PRIMARY OSTEOARTHRITIS OF LEFT KNEE: ICD-10-CM

## 2024-02-29 RX ORDER — ETODOLAC 400 MG
400 TABLET ORAL 2 TIMES DAILY
Qty: 180 TABLET | Refills: 0 | Status: SHIPPED | OUTPATIENT
Start: 2024-02-29 | End: 2024-06-07

## 2024-03-01 NOTE — TELEPHONE ENCOUNTER
Left message for patient to call us back to discuss surgery dates    Leslie Tan M.A.  Specialty surgery Scheduler

## 2024-03-02 DIAGNOSIS — F41.1 GAD (GENERALIZED ANXIETY DISORDER): ICD-10-CM

## 2024-03-04 RX ORDER — VENLAFAXINE HYDROCHLORIDE 150 MG/1
150 CAPSULE, EXTENDED RELEASE ORAL DAILY
Qty: 90 CAPSULE | Refills: 1 | Status: SHIPPED | OUTPATIENT
Start: 2024-03-04 | End: 2024-08-15

## 2024-03-13 NOTE — TELEPHONE ENCOUNTER
Patient called wanting to schedule for January of 2025, I explained that is too far out for us to book at this time and she should call us in Sept or Oct to reschedule and that new surgery orders will be needed. Thank you

## 2024-03-16 ENCOUNTER — HEALTH MAINTENANCE LETTER (OUTPATIENT)
Age: 59
End: 2024-03-16

## 2024-03-20 ENCOUNTER — HOSPITAL ENCOUNTER (OUTPATIENT)
Dept: MAMMOGRAPHY | Facility: CLINIC | Age: 59
Discharge: HOME | End: 2024-03-20
Attending: NURSE PRACTITIONER | Admitting: NURSE PRACTITIONER
Payer: COMMERCIAL

## 2024-03-20 DIAGNOSIS — Z12.31 VISIT FOR SCREENING MAMMOGRAM: ICD-10-CM

## 2024-03-20 PROCEDURE — 77063 BREAST TOMOSYNTHESIS BI: CPT

## 2024-05-23 DIAGNOSIS — I10 BENIGN ESSENTIAL HYPERTENSION: Primary | ICD-10-CM

## 2024-05-24 NOTE — TELEPHONE ENCOUNTER
RN TRIAGE CALL:    Patient Contact    Attempt # 1    Was call answered?  No.  Left message on voicemail with information to call me back.    STEPHANIE Cabezas, RN        Carmencita Hamlin RN   to [facility]    5/24/24  8:46 AM  Break in medication

## 2024-05-28 NOTE — TELEPHONE ENCOUNTER
Attempt #2 to call.     RN has attempted to contact this patient by phone to return their call, but there is no response. RN left voicemail and requested return call to [facility] at [phone]    SARAI BoudreauxN, RN

## 2024-05-29 RX ORDER — CHLORTHALIDONE 25 MG/1
25 TABLET ORAL DAILY
Qty: 90 TABLET | Refills: 3 | Status: SHIPPED | OUTPATIENT
Start: 2024-05-29 | End: 2025-05-24

## 2024-05-29 NOTE — TELEPHONE ENCOUNTER
RN Triage    Patient Contact    Attempt # 3    RN did attempt to reach patient. No answer. Message left for patient to call the clinic back and ask to speak to a triage nurse.     Nancy Pardo RN on 5/29/2024 at 10:00 AM     Patient requesting Chlorthalidone refill but is a break in med. Made three attempts to reach patient with no response. Please review and advise.

## 2024-06-07 DIAGNOSIS — M17.12 PRIMARY OSTEOARTHRITIS OF LEFT KNEE: ICD-10-CM

## 2024-06-07 RX ORDER — ETODOLAC 400 MG
400 TABLET ORAL 2 TIMES DAILY
Qty: 180 TABLET | Refills: 0 | Status: SHIPPED | OUTPATIENT
Start: 2024-06-07 | End: 2024-09-26

## 2024-07-03 ENCOUNTER — PATIENT OUTREACH (OUTPATIENT)
Dept: CARE COORDINATION | Facility: CLINIC | Age: 59
End: 2024-07-03
Payer: COMMERCIAL

## 2024-08-15 DIAGNOSIS — F41.1 GAD (GENERALIZED ANXIETY DISORDER): ICD-10-CM

## 2024-08-15 RX ORDER — VENLAFAXINE HYDROCHLORIDE 150 MG/1
150 CAPSULE, EXTENDED RELEASE ORAL DAILY
Qty: 90 CAPSULE | Refills: 1 | Status: SHIPPED | OUTPATIENT
Start: 2024-08-15

## 2024-08-23 NOTE — TELEPHONE ENCOUNTER
Left message for patient to return call to schedule EGD/colonoscopy. If Maria G or Alexandrea are not available, please transfer to same day surgery           Attending Attestation (For Attendings USE Only)...

## 2024-09-24 DIAGNOSIS — M17.12 PRIMARY OSTEOARTHRITIS OF LEFT KNEE: ICD-10-CM

## 2024-09-25 ENCOUNTER — TELEPHONE (OUTPATIENT)
Dept: ORTHOPEDICS | Facility: CLINIC | Age: 59
End: 2024-09-25
Payer: COMMERCIAL

## 2024-09-25 NOTE — TELEPHONE ENCOUNTER
Other: Patient is calling to schedule right knee surgery with Dr. Walden. She is hoping to schedule the second week of January if possible. Please call her.      Could we send this information to you in [employer] or would you prefer to receive a phone call?:   Patient would prefer a phone call   Okay to leave a detailed message?: Yes at Cell number on file:    Telephone Information:   Mobile [phone]

## 2024-09-25 NOTE — TELEPHONE ENCOUNTER
Case request was cancelled due to patient not scheduling this last year. Patient last saw Dr. Walden 1/10/2024. Forwarding to Dr. Walden team to see if patient needs another in office appointment before putting in new orders. If ok to have surgery, please put in a new case request.     Kari VALDERRAMA RN, Specialty Clinic 09/25/24 2:13 PM

## 2024-09-26 RX ORDER — ETODOLAC 400 MG
400 TABLET ORAL 2 TIMES DAILY
Qty: 180 TABLET | Refills: 0 | Status: SHIPPED | OUTPATIENT
Start: 2024-09-26

## 2024-09-26 NOTE — TELEPHONE ENCOUNTER
Called patient and scheduled in office visit with Dr. Walden on 10/9 per Dr. Walden.     Kari VALDERRAMA RN, Specialty Clinic 09/26/24 12:47 PM

## 2024-10-09 ENCOUNTER — ANCILLARY PROCEDURE (OUTPATIENT)
Dept: GENERAL RADIOLOGY | Facility: CLINIC | Age: 59
End: 2024-10-09
Attending: ORTHOPAEDIC SURGERY
Payer: COMMERCIAL

## 2024-10-09 ENCOUNTER — OFFICE VISIT (OUTPATIENT)
Dept: ORTHOPEDICS | Facility: CLINIC | Age: 59
End: 2024-10-09
Payer: COMMERCIAL

## 2024-10-09 ENCOUNTER — MYC MEDICAL ADVICE (OUTPATIENT)
Dept: FAMILY MEDICINE | Facility: CLINIC | Age: 59
End: 2024-10-09

## 2024-10-09 ENCOUNTER — PREP FOR PROCEDURE (OUTPATIENT)
Dept: ORTHOPEDICS | Facility: CLINIC | Age: 59
End: 2024-10-09

## 2024-10-09 VITALS — BODY MASS INDEX: 31.65 KG/M2 | HEIGHT: 65 IN | WEIGHT: 190 LBS

## 2024-10-09 DIAGNOSIS — M17.11 PRIMARY OSTEOARTHRITIS OF RIGHT KNEE: ICD-10-CM

## 2024-10-09 DIAGNOSIS — M17.11 PRIMARY OSTEOARTHRITIS OF RIGHT KNEE: Primary | ICD-10-CM

## 2024-10-09 PROCEDURE — 73562 X-RAY EXAM OF KNEE 3: CPT | Mod: TC | Performed by: RADIOLOGY

## 2024-10-09 PROCEDURE — 99214 OFFICE O/P EST MOD 30 MIN: CPT | Performed by: ORTHOPAEDIC SURGERY

## 2024-10-09 ASSESSMENT — PAIN SCALES - GENERAL: PAINLEVEL: SEVERE PAIN (7)

## 2024-10-09 NOTE — PROGRESS NOTES
HISTORY OF PRESENT ILLNESS    Brittnee Herron is a 59 year old female seen for follow up of right  knee osteoarthritis.    She cancelled her January 2024 right knee arthroplasty due to a a left shoulder dislocation that occurred on 1/17/24.  Corticosteroid injections: yes  Viscosupplementation injections: no    Physical therapy: yes  : yes    Present symptoms: pain to walk  Pain at work  Pain with stairs  Swelling  Catching, locking and giving-way   Using lidocaine patch.      KNEE EXAM:   Mild effusion   Alignment: mild varus  Diffuse tenderness  Range of motion 3-120*  Collaterals stable.       Xrays from today right knee:  severe medial joint space narrowing moderate patellofemoral joint osteoarthritis.     MRI (done to assess the lateral and patellofemoral joint compartments) results from 1/3/23 of the right knee, which are reviewed with the patient by myself and showed:    1. High-grade tear of the posterior root ligament of the medial  meniscus with associated significant extrusion of the meniscus.  2. 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 Subtle associated bone marrow edema on  both sides of the joint.  3. Focal grade IV chondromalacia of the patella.  4. Grade II - III chondromalacia of the lateral compartment.  5. Moderate joint effusion.    Impression:     ICD-10-CM    1. Primary osteoarthritis of right knee  M17.11           Plan:  Total Knee Arthroplasty: We talked about the patient's condition and diagnosis.  Because of severe arthritis, and failure of conservative measures, I have suggested to once again schedule total knee arthroplasty of the right  knee(s).  I've talked in depth about the procedure and the risks, which include, but are not limited to blood loss requiring transfusion, infection, neurovascular injury, DVT, PE, continued pain, (both perioperative and persistent post-recovery pain), numbness around the wound,  instability, and potential anesthetic and perioperative medical complications, and the possibility of needing additional procedures. We also talked about recovery time, which differs from patient to patient.  We've encouraged attendance at the arthroplasty class at the hospital.  Medical clearance will need to be obtained.  Special considerations [facility].  She will think about [employer]. She's not sure if she wants to do that.        SHALINI Walden MD  Dept. Orthopedic Surgery  [facility]

## 2024-10-09 NOTE — LETTER
10/9/2024      Brittnee Herron  [address] [place] [address]      Dear Colleague,    Thank you for referring your patient, Brittnee Herron, to the [facility]. Please see a copy of my visit note below.    HISTORY OF PRESENT ILLNESS    Brittnee Herron is a 59 year old female seen for follow up of right  knee osteoarthritis.    She cancelled her January 2024 right knee arthroplasty due to a a left shoulder dislocation that occurred on 1/17/24.  Corticosteroid injections: yes  Viscosupplementation injections: no    Physical therapy: yes  : yes    Present symptoms: pain to walk  Pain at work  Pain with stairs  Swelling  Catching, locking and giving-way   Using lidocaine patch.      KNEE EXAM:   Mild effusion   Alignment: mild varus  Diffuse tenderness  Range of motion 3-120*  Collaterals stable.       Xrays from today right knee:  severe medial joint space narrowing moderate patellofemoral joint osteoarthritis.     MRI (done to assess the lateral and patellofemoral joint compartments) results from 1/3/23 of the right knee, which are reviewed with the patient by myself and showed:    1. High-grade tear of the posterior root ligament of the medial  meniscus with associated significant extrusion of the meniscus.  2. 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 Subtle associated bone marrow edema on  both sides of the joint.  3. Focal grade IV chondromalacia of the patella.  4. Grade II - III chondromalacia of the lateral compartment.  5. Moderate joint effusion.    Impression:     ICD-10-CM    1. Primary osteoarthritis of right knee  M17.11           Plan:  Total Knee Arthroplasty: We talked about the patient's condition and diagnosis.  Because of severe arthritis, and failure of conservative measures, I have suggested to once again schedule total knee arthroplasty of the right  knee(s).  I've talked in depth about the procedure  and the risks, which include, but are not limited to blood loss requiring transfusion, infection, neurovascular injury, DVT, PE, continued pain, (both perioperative and persistent post-recovery pain), numbness around the wound, instability, and potential anesthetic and perioperative medical complications, and the possibility of needing additional procedures. We also talked about recovery time, which differs from patient to patient.  We've encouraged attendance at the arthroplasty class at the hospital.  Medical clearance will need to be obtained.  Special considerations gr[facility].  She will think about [employer]. She's not sure if she wants to do that.        SHALINI Walden MD  Dept. Orthopedic Surgery  [facility]      Again, thank you for allowing me to participate in the care of your patient.        Sincerely,        Lukas Walden MD

## 2024-10-09 NOTE — PATIENT INSTRUCTIONS
Ibuprofen 200mg pills. Take 3 pills, every 8 hours as needed for pain. It is ok to take 3 pills every 6 hours, but don't take more than 1800 mg per day.    Tylenol: comes in 325mg, 500, and 650 mg strengths.  Take iTylenol every 4-6 hours.  Do not take more than 3000mg in a day.

## 2024-10-15 ENCOUNTER — TELEPHONE (OUTPATIENT)
Dept: ORTHOPEDICS | Facility: CLINIC | Age: 59
End: 2024-10-15
Payer: COMMERCIAL

## 2024-10-17 NOTE — TELEPHONE ENCOUNTER
Other: Pt called would like to schedule surgery appt for December or early Jan. Please call back and schedule surgery.      Could we send this information to you in [employer]t or would you prefer to receive a phone call?:   Patient would prefer a phone call   Okay to leave a detailed message?: Yes at Home number on file [phone] (home)

## 2024-10-22 NOTE — TELEPHONE ENCOUNTER
Patient is still waiting to hear back for surgery scheduling.    Patient was called on 10/15 and number left to call back   Leslie Tan M.A.

## 2024-10-23 NOTE — TELEPHONE ENCOUNTER
Type of surgery: RIGHT TOTAL KNEE ARTHROPLASTY (Right)   Location of surgery: [facility]  Date and time of surgery: 12-30-24  Surgeon: Win  Pre-Op Appt Date:   Post-Op Appt Date:    Packet sent out: Yes  Pre-cert/Authorization completed:    Date:

## 2024-11-13 DIAGNOSIS — F41.1 GAD (GENERALIZED ANXIETY DISORDER): ICD-10-CM

## 2024-11-13 RX ORDER — VENLAFAXINE HYDROCHLORIDE 37.5 MG/1
CAPSULE, EXTENDED RELEASE ORAL
Qty: 90 CAPSULE | Refills: 3 | Status: SHIPPED | OUTPATIENT
Start: 2024-11-13

## 2024-11-13 NOTE — TELEPHONE ENCOUNTER
Patient left a vm asking that she be made aware should Dr. Walden have any availability for surgery within December. Patient left callback number [phone].     Yara Omer on 11/13/2024 at 9:18 AM

## 2024-12-03 ENCOUNTER — TELEPHONE (OUTPATIENT)
Dept: ORTHOPEDICS | Facility: CLINIC | Age: 59
End: 2024-12-03
Payer: COMMERCIAL

## 2024-12-03 NOTE — TELEPHONE ENCOUNTER
Called patient and notified her that she will be receiving a packet prior to surgery. Patient confirmed understanding.     Kari VALDERRAMA RN, Specialty Clinic 12/03/24 4:09 PM

## 2024-12-03 NOTE — TELEPHONE ENCOUNTER
Other: Pt is wondering if she will receive another surgery packet      Could we send this information to you in [email or web address] or would you prefer to receive a phone call?:   Patient would prefer a phone call   Okay to leave a detailed message?: Yes at Cell number on file:    Telephone Information:   Mobile [phone]

## 2024-12-09 DIAGNOSIS — M17.12 PRIMARY OSTEOARTHRITIS OF LEFT KNEE: ICD-10-CM

## 2024-12-09 RX ORDER — ETODOLAC 400 MG/1
400 TABLET, FILM COATED ORAL 2 TIMES DAILY
Qty: 180 TABLET | Refills: 0 | Status: SHIPPED | OUTPATIENT
Start: 2024-12-09

## 2025-01-02 RX ORDER — POLYETHYLENE GLYCOL-3350 AND ELECTROLYTES 236; 6.74; 5.86; 2.97; 22.74 G/274.31G; G/274.31G; G/274.31G; G/274.31G; G/274.31G
POWDER, FOR SOLUTION ORAL
COMMUNITY
End: 2025-01-08

## 2025-01-02 RX ORDER — BISACODYL 5 MG/1
TABLET, DELAYED RELEASE ORAL
COMMUNITY
End: 2025-01-08

## 2025-01-02 RX ORDER — KETOROLAC TROMETHAMINE 30 MG/ML
INJECTION, SOLUTION INTRAMUSCULAR; INTRAVENOUS
COMMUNITY
Start: 2024-01-17 | End: 2025-01-08

## 2025-01-08 ENCOUNTER — OFFICE VISIT (OUTPATIENT)
Dept: FAMILY MEDICINE | Facility: CLINIC | Age: 60
End: 2025-01-08
Payer: COMMERCIAL

## 2025-01-08 ENCOUNTER — TELEPHONE (OUTPATIENT)
Dept: ORTHOPEDICS | Facility: CLINIC | Age: 60
End: 2025-01-08

## 2025-01-08 VITALS
HEART RATE: 110 BPM | DIASTOLIC BLOOD PRESSURE: 82 MMHG | HEIGHT: 65 IN | TEMPERATURE: 96.9 F | SYSTOLIC BLOOD PRESSURE: 115 MMHG | BODY MASS INDEX: 33.02 KG/M2 | OXYGEN SATURATION: 95 % | WEIGHT: 198.2 LBS | RESPIRATION RATE: 18 BRPM

## 2025-01-08 DIAGNOSIS — M17.11 PRIMARY OSTEOARTHRITIS OF RIGHT KNEE: ICD-10-CM

## 2025-01-08 DIAGNOSIS — I10 ESSENTIAL HYPERTENSION: ICD-10-CM

## 2025-01-08 DIAGNOSIS — F33.1 MODERATE RECURRENT MAJOR DEPRESSION (H): ICD-10-CM

## 2025-01-08 DIAGNOSIS — Z01.818 PREOPERATIVE EXAMINATION: Primary | ICD-10-CM

## 2025-01-08 LAB
ANION GAP SERPL CALCULATED.3IONS-SCNC: 12 MMOL/L (ref 7–15)
BUN SERPL-MCNC: 17.6 MG/DL (ref 8–23)
CALCIUM SERPL-MCNC: 9.2 MG/DL (ref 8.8–10.4)
CHLORIDE SERPL-SCNC: 101 MMOL/L (ref 98–107)
CREAT SERPL-MCNC: 0.64 MG/DL (ref 0.51–0.95)
EGFRCR SERPLBLD CKD-EPI 2021: >90 ML/MIN/1.73M2
GLUCOSE SERPL-MCNC: 117 MG/DL (ref 70–99)
HCO3 SERPL-SCNC: 27 MMOL/L (ref 22–29)
POTASSIUM SERPL-SCNC: 3.6 MMOL/L (ref 3.4–5.3)
SODIUM SERPL-SCNC: 140 MMOL/L (ref 135–145)

## 2025-01-08 PROCEDURE — 99214 OFFICE O/P EST MOD 30 MIN: CPT | Performed by: FAMILY MEDICINE

## 2025-01-08 PROCEDURE — 80048 BASIC METABOLIC PNL TOTAL CA: CPT | Performed by: FAMILY MEDICINE

## 2025-01-08 PROCEDURE — 36415 COLL VENOUS BLD VENIPUNCTURE: CPT | Performed by: FAMILY MEDICINE

## 2025-01-08 RX ORDER — SENNA AND DOCUSATE SODIUM 50; 8.6 MG/1; MG/1
1-2 TABLET, FILM COATED ORAL 2 TIMES DAILY PRN
Qty: 60 TABLET | Refills: 0 | Status: SHIPPED | OUTPATIENT
Start: 2025-01-08

## 2025-01-08 RX ORDER — ACETAMINOPHEN 500 MG
500-1000 TABLET ORAL EVERY 6 HOURS PRN
Qty: 100 TABLET | Refills: 0 | Status: SHIPPED | OUTPATIENT
Start: 2025-01-08

## 2025-01-08 ASSESSMENT — PAIN SCALES - GENERAL: PAINLEVEL_OUTOF10: MILD PAIN (2)

## 2025-01-08 NOTE — PROGRESS NOTES
Preoperative Evaluation  [address] [address]  Phone: [phone]  Fax: [phone]  Primary Provider: Alexandrea Mcqueen NP  Pre-op Performing Provider: Tatyana Hill Mai, MD  Jan 8, 2025 1/8/2025   Surgical Information   What procedure is being done? Right total knee arthoplasty    Facility or Hospital where procedure/surgery will be performed: [facility]    Who is doing the procedure / surgery? Lukas Walden MD    Date of surgery / procedure: 01/13/2025   Time of surgery / procedure: 7:30 am   Where do you plan to recover after surgery? at home with family     Fax number for surgical facility: Note does not need to be faxed, will be available electronically in Epic.    Assessment & Plan     The proposed surgical procedure is considered INTERMEDIATE risk.      ICD-10-CM    1. Preoperative examination  Z01.818       2. Primary osteoarthritis of right knee  M17.11 acetaminophen (TYLENOL) 500 MG tablet     SENNA-docusate sodium (SENNA S) 8.6-50 MG tablet      3. Essential hypertension  I10 BASIC METABOLIC PANEL     BASIC METABOLIC PANEL      4. Moderate recurrent major depression (H)  F33.1          Her blood pressure has been normal and stable with the chlorthalidone and losartan.  She feels her depression has been well-controlled with Effexor with no side effect.  PHQ-9 score 12 today.  She has no concerns of her mental health.         - No identified additional risk factors other than previously addressed    Cardiovascular:  No cardiovascular risks identified.  No history of CAD, CVA, or DM.  Her blood pressure been well-controlled.  She denies cardiac symptoms.  Cardiac exam today was normal.  No no further cardiovascular work-up is needed for this procedure.    Diabetes:  She does not have diabetes.    Pulmonary:   No pulmonary risk identified.  Never smoked.  No history of asthma or COPD.   - Oxygen as  "needed to maintain O2 sat above 94% during and after procedure.    Obstructive Sleep Apnea:   No history of sleep apnea.  BMI of 33   Oxygen as needed to maintain O2 sat above 94% during and after procedure    Anemia/Bleeding/Clotting:   No history of bleeding disorder.  No history of PE/DVT.  PE/DVT prophylaxis per surgeon's recommendation/desire.  Recommended to consider leg pumps during and after the procedure until she fully ambulatory.  No history of anemia or blood transfusion and is okay to be transfused if necessary.     Social and Substance:   No social or substance concerns identified.  Her depression is well controlled no safety or mental health concerns.  Not on chronic pain meds.  No drugs or alcohol.  Never smoker.  She lives with her .    Infection:   No history of MRSA  Prophylactic antibiotics per surgeon's recommendation/desire.  Shower with provided antimicrobial shampoo the day before and a.m. the procedure    Anesthesia: No history of anesthesia complication but she had \"severe emesis\" with anesthesia with her last procedure.      Antiplatelet or Anticoagulation Medication Instructions  Additional Medication Instructions  Please take your prescribed medications as prescribed except.  No Aspirin or NSAID (Ibuprofen, Aleve, Motrin, Naproxen, ect.) until after the procedure  Ok to take Tylenol 1000 mg every 6 hrs as needed for pain  Hold the Losartan and Hythalidone on am of the procedure; resume it after the procedure   Take the Effexor as usual on day of procedure  Take shower with provided shampoo the day before and am of procedure  No alcohol or smoking for 24 before the procedure  Nothing to eat for 8 hrs before the procedure.       Recommendation  Approval given to proceed with proposed procedure, without further diagnostic evaluation.    Bety Beaulieu is a 59 year old, presenting for the following:  Pre-Op Exam          1/8/2025     1:43 PM   Additional Questions   Roomed by " "Rick     HPI related to upcoming procedure: Knee arthroplasty        1/8/2025   Pre-Op Questionnaire   Have you ever had a heart attack or stroke? No   Have you ever had surgery on your heart or blood vessels, such as a stent placement, a coronary artery bypass, or surgery on an artery in your head, neck, heart, or legs? No   Do you have chest pain with activity? No   Do you have a history of heart failure? No   Do you currently have a cold, bronchitis or symptoms of other infection? No   Do you have a cough, shortness of breath, or wheezing? No   Do you or anyone in your family have previous history of blood clots? No   Do you or does anyone in your family have a serious bleeding problem such as prolonged bleeding following surgeries or cuts? No   Have you ever had problems with anemia or been told to take iron pills? No   Have you had any abnormal blood loss such as black, tarry or bloody stools, or abnormal vaginal bleeding? No   Have you ever had a blood transfusion? No   Are you willing to have a blood transfusion if it is medically needed before, during, or after your surgery? Yes   Have you or any of your relatives ever had problems with anesthesia? (!) YES:  \"severe emesis\" from anesthesia during her last procedure   Do you have sleep apnea, excessive snoring or daytime drowsiness? (!) YES   Do you have a CPAP machine? (!) NO    Do you have any artifical heart valves or other implanted medical devices like a pacemaker, defibrillator, or continuous glucose monitor? No   Do you have artificial joints? No   Are you allergic to latex? No     Health Care Directive  Patient does not have a Health Care Directive: Discussed advance care planning with patient; however, patient declined at this time.    Preoperative Review of    reviewed - controlled substances reflected in medication list.      Brittnee is here today for pre-op physical. She is scheduled for right knee arthroplasty on 1/13/25 with Dr. Walden " "at [facility] for arthritis.  Failed conservative management. It is planned for same day, but may need overnight inpatient recovery.   It is  general anesthesia. No personal or family history of anesthesia complication; had severe \"nausea with anesthesia\" with her last procedure.  No personal or family history pre-matured CAD or MI.  No DM or CVA.  No asthma or COPD.  Never smoked.  Has not been on steroid orally in the last 6 months.  Not taking Aspirin or other form of blood thinner.  Last dose of NSAID was last week.       She generally is doing well and has no concern today. No headache or dizziness. No running nose, nasal congestion, ST, coughing, fever or chill.  No chest pain or SOB.  No N/V/D/C or with urination.        Patient Active Problem List    Diagnosis Date Noted    Essential hypertension 02/19/2021     Priority: Medium    Recurrent major depressive disorder, in full remission 02/18/2021     Priority: Medium    LIAT (generalized anxiety disorder) 07/18/2018     Priority: Medium    Primary osteoarthritis of left knee 07/31/2017     Priority: Medium      Past Medical History:   Diagnosis Date    Depressive disorder, not elsewhere classified 1982    off meds for a year     Past Surgical History:   Procedure Laterality Date    ARTHROSCOPY KNEE WITH MEDIAL MENISCECTOMY Left 7/18/2017    Procedure: ARTHROSCOPY KNEE WITH MEDIAL MENISCECTOMY;  left knee arthroscopy with partial medial menisectomy;  Surgeon: Brittnee Bean MD;  Location: [facility] OR    COLONOSCOPY N/A 4/12/2023    Procedure: COLONOSCOPY;  Surgeon: Armando Saunders MD;  Location:  GI    HC REMOVAL OF OVARIAN CYST(S)  1990    laparoscopic    HC REMOVE TONSILS/ADENOIDS,<13 Y/O      ZZC LIGATE FALLOPIAN TUBE,POSTPARTUM  08/03/2002    Postpartum bilateral tubal ligation with partial salpingectomy through a mini laparotomy     Current Outpatient Medications   Medication Sig Dispense Refill    ondansetron (ZOFRAN-ODT) 4 MG ODT tab Take 1 " tablet (4 mg) by mouth every 8 hours as needed for nausea 30 tablet 1    venlafaxine (EFFEXOR XR) 150 MG 24 hr capsule TAKE 1 CAPSULE BY MOUTH EVERY DAY 90 capsule 1    venlafaxine (EFFEXOR XR) 37.5 MG 24 hr capsule TAKE 1 CAPSULE (37.5 MG) BYMOUTH DAILY WITH 150MG CAPSULE .5MG TOTAL 90 capsule 3    bisacodyl (DULCOLAX) 5 MG EC tablet TAKE 2 TABLETS AT 3 PM THE DAY BEFORE YOUR PROCEDURE. IF YOUR PROCEDURE IS BEFORE 11 AM, TAKE 2 ADDITIONAL TABLETS AT 11 PM. IF AFTER 11AM, (Patient not taking: Reported on 1/8/2025)      chlorthalidone (HYGROTON) 25 MG tablet TAKE 1 TABLET (25 MG) BY MOUTH DAILY FOR 90 DAYS (Patient not taking: Reported on 1/8/2025) 90 tablet 3    etodolac (LODINE) 400 MG tablet TAKE 1 TABLET BY MOUTH TWICE A DAY (Patient not taking: Reported on 1/8/2025) 180 tablet 0    ketorolac (TORADOL) 30 MG/ML injection Inject 60 mL by intramuscular route. (Patient not taking: Reported on 1/8/2025)      losartan (COZAAR) 25 MG tablet Take 1 tablet (25 mg) by mouth daily (Patient not taking: Reported on 1/8/2025) 90 tablet 3    polyethylene glycol (GAVILYTE-G) 236 g suspension TAKE AS DIRECTED PER PRINTED INSTRUCTIONS (Patient not taking: Reported on 1/8/2025)         Allergies   Allergen Reactions    Lisinopril Cough        Social History     Tobacco Use    Smoking status: Never    Smokeless tobacco: Never   Substance Use Topics    Alcohol use: No     Family History   Problem Relation Age of Onset    Breast Cancer Mother     Arthritis Mother     Hypertension Mother     Multiple Sclerosis Mother     Unknown/Adopted Father     Alcohol/Drug Maternal Grandmother         alcohol    Alcohol/Drug Maternal Grandfather         alcohol    Diabetes Maternal Grandfather         adult-onset    No Known Problems Daughter     No Known Problems Daughter     No Known Problems Son     Crohn's Disease Maternal Half-Brother     No Known Problems Maternal Half-Sister     No Known Problems Paternal Half-Brother     No Known  "Problems Paternal Half-Sister      History   Drug Use No             Review of Systems  Constitutional, HEENT, cardiovascular, pulmonary, GI, , musculoskeletal, neuro, skin, endocrine and psych systems are negative, except as otherwise noted.    Objective    /82 (BP Location: Left arm, Patient Position: Sitting, Cuff Size: Adult Regular)   Pulse 110   Temp 96.9  F (36.1  C) (Temporal)   Resp 18   Ht 1.645 m (5' 4.76\")   Wt 89.9 kg (198 lb 3.2 oz)   LMP 07/10/2018   SpO2 95%   BMI 33.22 kg/m     Estimated body mass index is 33.22 kg/m  as calculated from the following:    Height as of this encounter: 1.645 m (5' 4.76\").    Weight as of this encounter: 89.9 kg (198 lb 3.2 oz).  Physical Exam     GENERAL APPEARANCE: healthy, alert and no distress     EYES: EOMI,- PERRL     HENT: ear canals and TM's normal. Nares are non-congested. Oropharynx is pink and moist, no ulcers or lesions. No tender with palpation to the sinuses.     NECK: no adenopathy, no asymmetry.  No tender with palpation to the cervical spine and its para-spinous muscle bilaterally.     RESP: lungs clear to auscultation - no rales, rhonchi or wheezes     CV: regular rates and rhythm and no murmur.     ABDOMEN:  soft, nontender, nondistended with no palpable masses and bowel sounds normal     MS: extremities normal- no gross deformities noted.  No edema.  All 4 extremities are equally in strength.     NEURO: Normal strength and tone,  mentation intact and speech normal.  No focal neurological deficit     PSYCH: mentation appears normal. and affect normal/bright          No results for input(s): \"HGB\", \"PLT\", \"INR\", \"NA\", \"POTASSIUM\", \"CR\", \"A1C\" in the last 8760 hours.     Diagnostics  Recent Results (from the past 48 hours)   BASIC METABOLIC PANEL    Collection Time: 01/08/25  2:54 PM   Result Value Ref Range    Sodium 140 135 - 145 mmol/L    Potassium 3.6 3.4 - 5.3 mmol/L    Chloride 101 98 - 107 mmol/L    Carbon Dioxide (CO2) 27 22 - 29 " mmol/L    Anion Gap 12 7 - 15 mmol/L    Urea Nitrogen 17.6 8.0 - 23.0 mg/dL    Creatinine 0.64 0.51 - 0.95 mg/dL    GFR Estimate >90 >60 mL/min/1.73m2    Calcium 9.2 8.8 - 10.4 mg/dL    Glucose 117 (H) 70 - 99 mg/dL      No EKG required, no history of coronary heart disease, significant arrhythmia, peripheral arterial disease or other structural heart disease.    Revised Cardiac Risk Index (RCRI)  The patient has the following serious cardiovascular risks for perioperative complications:   - No serious cardiac risks = 0 points     RCRI Interpretation: 0 points: Class I (very low risk - 0.4% complication rate)         Signed Electronically by: Tatyana Hill Mai, MD  A copy of this evaluation report is provided to the requesting physician.       Answers submitted by the patient for this visit:  Patient Health Questionnaire (Submitted on 1/8/2025)  If you checked off any problems, how difficult have these problems made it for you to do your work, take care of things at home, or get along with other people?: Not difficult at all  PHQ9 TOTAL SCORE: 12

## 2025-01-08 NOTE — TELEPHONE ENCOUNTER
Reason for Call:  Form, our goal is to have forms completed with 72 hours, however, some forms may require a visit or additional information.    Type of letter, form or note:  FMLA    Who is the form from?:  Employer (if other please explain)    Where did the form come from: Patient or family brought in       What clinic location was the form placed at?: [facility]     Where the form was placed:  I will ask care team best way to handle    What number is listed as a contact on the form?: Human Resources fax is [phone].        Additional comments: Brittnee would like it faxed to them and then would like the original mailed to her at home.     She is scheduled for her procedure with Dr Walden this coming Monday, 01/13 and would appreciate this being addressed as soon as possible. Thank you!    Call taken on 1/8/2025 at 3:07 PM by Devin Cason

## 2025-01-08 NOTE — PATIENT INSTRUCTIONS
Please take your prescribed medications as prescribed except.  No Aspirin or NSAID (Ibuprofen, Aleve, Motrin, Naproxen, ect.) until after the procedure  Ok to take Tylenol 1000 mg every 6 hrs as needed for pain  Hold the Losartan and Hythalidone on am of the procedure; resume it after the procedure   Take the Effexor as usual on day of procedure  Take shower with provided shampoo the day before and am of procedure  No alcohol or smoking for 24 before the procedure  Nothing to eat for 8 hrs before the procedure.

## 2025-01-09 ENCOUNTER — DOCUMENTATION ONLY (OUTPATIENT)
Dept: ORTHOPEDICS | Facility: CLINIC | Age: 60
End: 2025-01-09
Payer: COMMERCIAL

## 2025-01-09 NOTE — PROGRESS NOTES
Ortho Navigator unable to make contact with patient after several attempts. Patient has been medically cleared for surgery at her preop on 1/8/25.    Jayna Coulter RN   [facility]

## 2025-01-09 NOTE — PROGRESS NOTES
Form completed for: Brittnee Herron  What was done with form: faxed to [facility] [place] [phone] confirmed thru alannah fax.  Original mailed to pt listed home address per pt request.     Arturo HO    I have personally performed a face to face diagnostic evaluation on this patient. I have reviewed the ACP note and agree with the history, exam and plan of care, except as noted.

## 2025-01-09 NOTE — PROGRESS NOTES
Ortho Navigator Note      Pre-op Date 1/8/25     Medical Clearance  CLEARED     Labs STABLE      COVID Test Date No longer indicated      Skin  Intact      Activity: Ambulates independently      Equipment Need Patient will NOT likely need a walker for discharge. Defer to PT/OT for recs.       Meds to Hold Held all supplements 14 days prior to surgery  Additional Medication Instructions  Please take your prescribed medications as prescribed except.  No Aspirin or NSAID (Ibuprofen, Aleve, Motrin, Naproxen, ect.) until after the procedure  Ok to take Tylenol 1000 mg every 6 hrs as needed for pain  Hold the Losartan and Hythalidone on am of the procedure; resume it after the procedure   Take the Effexor as usual on day of procedure    * Medication recommendations are not intended to be exhaustive; they are limited to common medications that are potentially dangerous if incorrectly managed   NPO Instructions  Instructed to stop solids 8 hr prior to arrival and clears 2 hr prior to arrival.       Pre-op Joint Education Complete? Refused    Discharge Plan Patient has plan to discharge home on morning of POD 1.   Spouse Chao will arrive at hospital at 0800 to participate in therapy and discharge education. They will then transport patient home    /Transportation Spouse Chao will be  and transportation.  is physically able to care for patient.      Barriers to Discharge No barriers to discharge.      Additional Info/   Special Needs : Patient had no unanswered questions or concerns.        Post Op PT First post op appt 1/15/25              12/30/24 [id number]   Discharge Planning   Patient/Family Anticipates Transition to home   Concerns to be Addressed all concerns addressed in this encounter   Living Arrangements   People in Home spouse;child(zo), adult   Type of Residence Private Residence   Is your private residence a single family home or apartment? Single family home   Number of Stairs, Within Home, Primary  two   Once home, are you able to live on one level? Yes   Which level? Main Level   Which rooms are not on the main level? Bedroom;Bathroom;Living Room;Kitchen   Bathroom Shower/Tub Tub/Shower unit   Equipment Currently Used at Home cane, quad;walker, standard   Support System   Support Systems Spouse/Significant Other   Do you have someone available to stay with you one or two nights once you are home? Yes   Medical Clearance   Date of Physical 01/08/25   [facility] Name [facility]   It is recommended that you call and check with any specialty providers before surgery to see if you need surgical clearance.  Do you see any specialty providers outside of your primary care provider? No   Blood   Known Bleeding Disorder or Coagulopathy No   Does the patient have any Worship/cultural preferences related to blood products? No   Education   Has the patient scheduled or completed pre-op total joint education, either in class or online, in the last 12 months? No   Patient attended total joint pre-op class/received pre-op teaching  email/phone call   Relationship/Environment   Name(s) of People in Home Spouse Chao, adult child

## 2025-01-10 ENCOUNTER — ANESTHESIA EVENT (OUTPATIENT)
Dept: SURGERY | Facility: CLINIC | Age: 60
End: 2025-01-10
Payer: COMMERCIAL

## 2025-01-13 ENCOUNTER — ANESTHESIA (OUTPATIENT)
Dept: SURGERY | Facility: CLINIC | Age: 60
End: 2025-01-13
Payer: COMMERCIAL

## 2025-01-13 ENCOUNTER — APPOINTMENT (OUTPATIENT)
Dept: GENERAL RADIOLOGY | Facility: CLINIC | Age: 60
End: 2025-01-13
Attending: ORTHOPAEDIC SURGERY
Payer: COMMERCIAL

## 2025-01-13 ENCOUNTER — HOSPITAL ENCOUNTER (OUTPATIENT)
Facility: CLINIC | Age: 60
Discharge: HOME | End: 2025-01-14
Attending: ORTHOPAEDIC SURGERY | Admitting: ORTHOPAEDIC SURGERY
Payer: COMMERCIAL

## 2025-01-13 DIAGNOSIS — Z96.651 STATUS POST TOTAL RIGHT KNEE REPLACEMENT: Primary | ICD-10-CM

## 2025-01-13 DIAGNOSIS — M17.12 PRIMARY OSTEOARTHRITIS OF LEFT KNEE: ICD-10-CM

## 2025-01-13 LAB
BACTERIA SPEC CULT: NORMAL
GLUCOSE BLDC GLUCOMTR-MCNC: 110 MG/DL (ref 70–99)
GRAM STAIN RESULT: NORMAL
GRAM STAIN RESULT: NORMAL

## 2025-01-13 PROCEDURE — 258N000003 HC RX IP 258 OP 636: Performed by: ORTHOPAEDIC SURGERY

## 2025-01-13 PROCEDURE — 272N000001 HC OR GENERAL SUPPLY STERILE: Performed by: ORTHOPAEDIC SURGERY

## 2025-01-13 PROCEDURE — 97530 THERAPEUTIC ACTIVITIES: CPT | Mod: GP | Performed by: PHYSICAL THERAPIST

## 2025-01-13 PROCEDURE — 250N000013 HC RX MED GY IP 250 OP 250 PS 637: Performed by: ORTHOPAEDIC SURGERY

## 2025-01-13 PROCEDURE — 87075 CULTR BACTERIA EXCEPT BLOOD: CPT | Performed by: ORTHOPAEDIC SURGERY

## 2025-01-13 PROCEDURE — 710N000010 HC RECOVERY PHASE 1, LEVEL 2, PER MIN: Performed by: ORTHOPAEDIC SURGERY

## 2025-01-13 PROCEDURE — 97161 PT EVAL LOW COMPLEX 20 MIN: CPT | Mod: GP | Performed by: PHYSICAL THERAPIST

## 2025-01-13 PROCEDURE — 258N000003 HC RX IP 258 OP 636: Performed by: NURSE ANESTHETIST, CERTIFIED REGISTERED

## 2025-01-13 PROCEDURE — 370N000017 HC ANESTHESIA TECHNICAL FEE, PER MIN: Performed by: ORTHOPAEDIC SURGERY

## 2025-01-13 PROCEDURE — C1776 JOINT DEVICE (IMPLANTABLE): HCPCS | Performed by: ORTHOPAEDIC SURGERY

## 2025-01-13 PROCEDURE — 250N000009 HC RX 250: Performed by: ORTHOPAEDIC SURGERY

## 2025-01-13 PROCEDURE — 360N000077 HC SURGERY LEVEL 4, PER MIN: Performed by: ORTHOPAEDIC SURGERY

## 2025-01-13 PROCEDURE — 87205 SMEAR GRAM STAIN: CPT | Performed by: ORTHOPAEDIC SURGERY

## 2025-01-13 PROCEDURE — 87070 CULTURE OTHR SPECIMN AEROBIC: CPT | Performed by: ORTHOPAEDIC SURGERY

## 2025-01-13 PROCEDURE — 999N000063 XR KNEE PORT RIGHT 1/2 VIEWS: Mod: RT

## 2025-01-13 PROCEDURE — 250N000009 HC RX 250: Performed by: NURSE ANESTHETIST, CERTIFIED REGISTERED

## 2025-01-13 PROCEDURE — 97110 THERAPEUTIC EXERCISES: CPT | Mod: GP | Performed by: PHYSICAL THERAPIST

## 2025-01-13 PROCEDURE — 999N000141 HC STATISTIC PRE-PROCEDURE NURSING ASSESSMENT: Performed by: ORTHOPAEDIC SURGERY

## 2025-01-13 PROCEDURE — 258N000001 HC RX 258: Performed by: ORTHOPAEDIC SURGERY

## 2025-01-13 PROCEDURE — 250N000011 HC RX IP 250 OP 636: Performed by: ORTHOPAEDIC SURGERY

## 2025-01-13 PROCEDURE — 272N000002 HC OR SUPPLY OTHER OPNP: Performed by: ORTHOPAEDIC SURGERY

## 2025-01-13 PROCEDURE — 250N000011 HC RX IP 250 OP 636: Performed by: NURSE ANESTHETIST, CERTIFIED REGISTERED

## 2025-01-13 PROCEDURE — 27447 TOTAL KNEE ARTHROPLASTY: CPT | Mod: RT | Performed by: ORTHOPAEDIC SURGERY

## 2025-01-13 PROCEDURE — 82962 GLUCOSE BLOOD TEST: CPT

## 2025-01-13 PROCEDURE — C1713 ANCHOR/SCREW BN/BN,TIS/BN: HCPCS | Performed by: ORTHOPAEDIC SURGERY

## 2025-01-13 DEVICE — IMPLANTABLE DEVICE: Type: IMPLANTABLE DEVICE | Site: KNEE | Status: FUNCTIONAL

## 2025-01-13 DEVICE — IMP BONE CEMENT STRK SIMPLEX GENTAMICIN 40GM 6195-1-001: Type: IMPLANTABLE DEVICE | Site: KNEE | Status: FUNCTIONAL

## 2025-01-13 DEVICE — IMP PATELLA ZIM KNEE ALL POLLY 32MM 42-5400-000-32: Type: IMPLANTABLE DEVICE | Site: KNEE | Status: FUNCTIONAL

## 2025-01-13 RX ORDER — MEPERIDINE HYDROCHLORIDE 25 MG/ML
12.5 INJECTION INTRAMUSCULAR; INTRAVENOUS; SUBCUTANEOUS EVERY 5 MIN PRN
Status: DISCONTINUED | OUTPATIENT
Start: 2025-01-13 | End: 2025-01-13 | Stop reason: HOSPADM

## 2025-01-13 RX ORDER — PROPOFOL 10 MG/ML
INJECTION, EMULSION INTRAVENOUS CONTINUOUS PRN
Status: DISCONTINUED | OUTPATIENT
Start: 2025-01-13 | End: 2025-01-13

## 2025-01-13 RX ORDER — ASPIRIN 325 MG
325 TABLET, DELAYED RELEASE (ENTERIC COATED) ORAL 2 TIMES DAILY WITH MEALS
Status: DISCONTINUED | OUTPATIENT
Start: 2025-01-13 | End: 2025-01-14 | Stop reason: HOSPADM

## 2025-01-13 RX ORDER — DEXAMETHASONE SODIUM PHOSPHATE 10 MG/ML
4 INJECTION, SOLUTION INTRAMUSCULAR; INTRAVENOUS
Status: DISCONTINUED | OUTPATIENT
Start: 2025-01-13 | End: 2025-01-13 | Stop reason: HOSPADM

## 2025-01-13 RX ORDER — LABETALOL HYDROCHLORIDE 5 MG/ML
10 INJECTION, SOLUTION INTRAVENOUS
Status: DISCONTINUED | OUTPATIENT
Start: 2025-01-13 | End: 2025-01-13 | Stop reason: HOSPADM

## 2025-01-13 RX ORDER — HYDROXYZINE HYDROCHLORIDE 25 MG/1
TABLET, FILM COATED ORAL
Qty: 30 TABLET | Refills: 0 | Status: SHIPPED | OUTPATIENT
Start: 2025-01-13

## 2025-01-13 RX ORDER — HYDROMORPHONE HCL IN WATER/PF 6 MG/30 ML
0.4 PATIENT CONTROLLED ANALGESIA SYRINGE INTRAVENOUS
Status: DISCONTINUED | OUTPATIENT
Start: 2025-01-13 | End: 2025-01-14 | Stop reason: HOSPADM

## 2025-01-13 RX ORDER — FENTANYL CITRATE 50 UG/ML
50 INJECTION, SOLUTION INTRAMUSCULAR; INTRAVENOUS EVERY 5 MIN PRN
Status: DISCONTINUED | OUTPATIENT
Start: 2025-01-13 | End: 2025-01-13 | Stop reason: HOSPADM

## 2025-01-13 RX ORDER — CELECOXIB 100 MG/1
400 CAPSULE ORAL ONCE
Status: COMPLETED | OUTPATIENT
Start: 2025-01-13 | End: 2025-01-13

## 2025-01-13 RX ORDER — SODIUM CHLORIDE, SODIUM LACTATE, POTASSIUM CHLORIDE, CALCIUM CHLORIDE 600; 310; 30; 20 MG/100ML; MG/100ML; MG/100ML; MG/100ML
INJECTION, SOLUTION INTRAVENOUS CONTINUOUS
Status: DISCONTINUED | OUTPATIENT
Start: 2025-01-13 | End: 2025-01-13 | Stop reason: HOSPADM

## 2025-01-13 RX ORDER — TRANEXAMIC ACID 650 MG/1
1950 TABLET ORAL ONCE
Status: COMPLETED | OUTPATIENT
Start: 2025-01-13 | End: 2025-01-13

## 2025-01-13 RX ORDER — ASPIRIN 325 MG
325 TABLET, DELAYED RELEASE (ENTERIC COATED) ORAL 2 TIMES DAILY WITH MEALS
Qty: 30 TABLET | Refills: 0 | Status: SHIPPED | OUTPATIENT
Start: 2025-01-13

## 2025-01-13 RX ORDER — SODIUM CHLORIDE, SODIUM LACTATE, POTASSIUM CHLORIDE, CALCIUM CHLORIDE 600; 310; 30; 20 MG/100ML; MG/100ML; MG/100ML; MG/100ML
INJECTION, SOLUTION INTRAVENOUS CONTINUOUS
Status: DISCONTINUED | OUTPATIENT
Start: 2025-01-13 | End: 2025-01-14 | Stop reason: HOSPADM

## 2025-01-13 RX ORDER — HYDROXYZINE HYDROCHLORIDE 25 MG/1
25 TABLET, FILM COATED ORAL EVERY 6 HOURS PRN
Status: DISCONTINUED | OUTPATIENT
Start: 2025-01-13 | End: 2025-01-14 | Stop reason: HOSPADM

## 2025-01-13 RX ORDER — ONDANSETRON 2 MG/ML
INJECTION INTRAMUSCULAR; INTRAVENOUS PRN
Status: DISCONTINUED | OUTPATIENT
Start: 2025-01-13 | End: 2025-01-13

## 2025-01-13 RX ORDER — PROCHLORPERAZINE MALEATE 5 MG/1
10 TABLET ORAL EVERY 6 HOURS PRN
Status: DISCONTINUED | OUTPATIENT
Start: 2025-01-13 | End: 2025-01-14 | Stop reason: HOSPADM

## 2025-01-13 RX ORDER — ACETAMINOPHEN 325 MG/1
975 TABLET ORAL ONCE
Status: COMPLETED | OUTPATIENT
Start: 2025-01-13 | End: 2025-01-13

## 2025-01-13 RX ORDER — NALOXONE HYDROCHLORIDE 0.4 MG/ML
0.2 INJECTION, SOLUTION INTRAMUSCULAR; INTRAVENOUS; SUBCUTANEOUS
Status: DISCONTINUED | OUTPATIENT
Start: 2025-01-13 | End: 2025-01-14 | Stop reason: HOSPADM

## 2025-01-13 RX ORDER — ACETAMINOPHEN 325 MG/1
975 TABLET ORAL EVERY 8 HOURS
Status: DISCONTINUED | OUTPATIENT
Start: 2025-01-13 | End: 2025-01-14 | Stop reason: HOSPADM

## 2025-01-13 RX ORDER — PHENYLEPHRINE HCL IN 0.9% NACL 50MG/250ML
PLASTIC BAG, INJECTION (ML) INTRAVENOUS CONTINUOUS PRN
Status: DISCONTINUED | OUTPATIENT
Start: 2025-01-13 | End: 2025-01-13

## 2025-01-13 RX ORDER — AMOXICILLIN 250 MG
1 CAPSULE ORAL 2 TIMES DAILY
Status: DISCONTINUED | OUTPATIENT
Start: 2025-01-13 | End: 2025-01-14 | Stop reason: HOSPADM

## 2025-01-13 RX ORDER — BUPIVACAINE HYDROCHLORIDE 7.5 MG/ML
INJECTION, SOLUTION INTRASPINAL PRN
Status: DISCONTINUED | OUTPATIENT
Start: 2025-01-13 | End: 2025-01-13

## 2025-01-13 RX ORDER — NALOXONE HYDROCHLORIDE 0.4 MG/ML
0.4 INJECTION, SOLUTION INTRAMUSCULAR; INTRAVENOUS; SUBCUTANEOUS
Status: DISCONTINUED | OUTPATIENT
Start: 2025-01-13 | End: 2025-01-14 | Stop reason: HOSPADM

## 2025-01-13 RX ORDER — ONDANSETRON 4 MG/1
4 TABLET, ORALLY DISINTEGRATING ORAL EVERY 6 HOURS PRN
Status: DISCONTINUED | OUTPATIENT
Start: 2025-01-13 | End: 2025-01-14 | Stop reason: HOSPADM

## 2025-01-13 RX ORDER — HYDROMORPHONE HYDROCHLORIDE 1 MG/ML
0.2 INJECTION, SOLUTION INTRAMUSCULAR; INTRAVENOUS; SUBCUTANEOUS EVERY 5 MIN PRN
Status: DISCONTINUED | OUTPATIENT
Start: 2025-01-13 | End: 2025-01-13 | Stop reason: HOSPADM

## 2025-01-13 RX ORDER — IBUPROFEN 600 MG/1
600 TABLET, FILM COATED ORAL EVERY 6 HOURS PRN
Qty: 30 TABLET | Refills: 0 | Status: SHIPPED | OUTPATIENT
Start: 2025-01-13

## 2025-01-13 RX ORDER — BUPIVACAINE HYDROCHLORIDE AND EPINEPHRINE 5; 5 MG/ML; UG/ML
INJECTION, SOLUTION PERINEURAL PRN
Status: DISCONTINUED | OUTPATIENT
Start: 2025-01-13 | End: 2025-01-13

## 2025-01-13 RX ORDER — ALBUTEROL SULFATE 0.83 MG/ML
2.5 SOLUTION RESPIRATORY (INHALATION) EVERY 4 HOURS PRN
Status: DISCONTINUED | OUTPATIENT
Start: 2025-01-13 | End: 2025-01-13 | Stop reason: HOSPADM

## 2025-01-13 RX ORDER — PROPOFOL 10 MG/ML
INJECTION, EMULSION INTRAVENOUS PRN
Status: DISCONTINUED | OUTPATIENT
Start: 2025-01-13 | End: 2025-01-13

## 2025-01-13 RX ORDER — DEXAMETHASONE SODIUM PHOSPHATE 4 MG/ML
INJECTION, SOLUTION INTRA-ARTICULAR; INTRALESIONAL; INTRAMUSCULAR; INTRAVENOUS; SOFT TISSUE PRN
Status: DISCONTINUED | OUTPATIENT
Start: 2025-01-13 | End: 2025-01-13

## 2025-01-13 RX ORDER — AMOXICILLIN 250 MG
1-2 CAPSULE ORAL 2 TIMES DAILY
Qty: 30 TABLET | Refills: 0 | Status: SHIPPED | OUTPATIENT
Start: 2025-01-13

## 2025-01-13 RX ORDER — OXYCODONE HYDROCHLORIDE 5 MG/1
10 TABLET ORAL EVERY 4 HOURS PRN
Status: DISCONTINUED | OUTPATIENT
Start: 2025-01-13 | End: 2025-01-14 | Stop reason: HOSPADM

## 2025-01-13 RX ORDER — OXYCODONE HYDROCHLORIDE 5 MG/1
5 TABLET ORAL EVERY 4 HOURS PRN
Status: DISCONTINUED | OUTPATIENT
Start: 2025-01-13 | End: 2025-01-14 | Stop reason: HOSPADM

## 2025-01-13 RX ORDER — ONDANSETRON 2 MG/ML
4 INJECTION INTRAMUSCULAR; INTRAVENOUS EVERY 30 MIN PRN
Status: DISCONTINUED | OUTPATIENT
Start: 2025-01-13 | End: 2025-01-13 | Stop reason: HOSPADM

## 2025-01-13 RX ORDER — HYDROMORPHONE HCL IN WATER/PF 6 MG/30 ML
0.2 PATIENT CONTROLLED ANALGESIA SYRINGE INTRAVENOUS
Status: DISCONTINUED | OUTPATIENT
Start: 2025-01-13 | End: 2025-01-14 | Stop reason: HOSPADM

## 2025-01-13 RX ORDER — IBUPROFEN 200 MG
600 TABLET ORAL EVERY 6 HOURS PRN
Status: DISCONTINUED | OUTPATIENT
Start: 2025-01-13 | End: 2025-01-14 | Stop reason: HOSPADM

## 2025-01-13 RX ORDER — POLYETHYLENE GLYCOL 3350 17 G/17G
17 POWDER, FOR SOLUTION ORAL DAILY
Status: DISCONTINUED | OUTPATIENT
Start: 2025-01-14 | End: 2025-01-14 | Stop reason: HOSPADM

## 2025-01-13 RX ORDER — LIDOCAINE 40 MG/G
CREAM TOPICAL
Status: DISCONTINUED | OUTPATIENT
Start: 2025-01-13 | End: 2025-01-14 | Stop reason: HOSPADM

## 2025-01-13 RX ORDER — ONDANSETRON 4 MG/1
4 TABLET, ORALLY DISINTEGRATING ORAL EVERY 30 MIN PRN
Status: DISCONTINUED | OUTPATIENT
Start: 2025-01-13 | End: 2025-01-13 | Stop reason: HOSPADM

## 2025-01-13 RX ORDER — HYDROMORPHONE HYDROCHLORIDE 1 MG/ML
0.5 INJECTION, SOLUTION INTRAMUSCULAR; INTRAVENOUS; SUBCUTANEOUS EVERY 5 MIN PRN
Status: DISCONTINUED | OUTPATIENT
Start: 2025-01-13 | End: 2025-01-13 | Stop reason: HOSPADM

## 2025-01-13 RX ORDER — CEFAZOLIN SODIUM/WATER 2 G/20 ML
2 SYRINGE (ML) INTRAVENOUS SEE ADMIN INSTRUCTIONS
Status: DISCONTINUED | OUTPATIENT
Start: 2025-01-13 | End: 2025-01-13 | Stop reason: HOSPADM

## 2025-01-13 RX ORDER — FENTANYL CITRATE 50 UG/ML
INJECTION, SOLUTION INTRAMUSCULAR; INTRAVENOUS PRN
Status: DISCONTINUED | OUTPATIENT
Start: 2025-01-13 | End: 2025-01-13

## 2025-01-13 RX ORDER — ONDANSETRON 2 MG/ML
4 INJECTION INTRAMUSCULAR; INTRAVENOUS EVERY 6 HOURS PRN
Status: DISCONTINUED | OUTPATIENT
Start: 2025-01-13 | End: 2025-01-14 | Stop reason: HOSPADM

## 2025-01-13 RX ORDER — OXYCODONE HYDROCHLORIDE 5 MG/1
5-10 TABLET ORAL EVERY 4 HOURS PRN
Qty: 26 TABLET | Refills: 0 | Status: SHIPPED | OUTPATIENT
Start: 2025-01-13

## 2025-01-13 RX ORDER — ACETAMINOPHEN 325 MG/1
650 TABLET ORAL EVERY 4 HOURS PRN
Qty: 100 TABLET | Refills: 0 | Status: SHIPPED | OUTPATIENT
Start: 2025-01-13

## 2025-01-13 RX ORDER — CEFAZOLIN SODIUM/WATER 2 G/20 ML
2 SYRINGE (ML) INTRAVENOUS
Status: COMPLETED | OUTPATIENT
Start: 2025-01-13 | End: 2025-01-13

## 2025-01-13 RX ORDER — HYDRALAZINE HYDROCHLORIDE 20 MG/ML
2.5-5 INJECTION INTRAMUSCULAR; INTRAVENOUS EVERY 10 MIN PRN
Status: DISCONTINUED | OUTPATIENT
Start: 2025-01-13 | End: 2025-01-13 | Stop reason: HOSPADM

## 2025-01-13 RX ORDER — CEFAZOLIN SODIUM/WATER 2 G/20 ML
2 SYRINGE (ML) INTRAVENOUS EVERY 8 HOURS
Status: COMPLETED | OUTPATIENT
Start: 2025-01-13 | End: 2025-01-13

## 2025-01-13 RX ORDER — TRANEXAMIC ACID 100 MG/ML
INJECTION, SOLUTION INTRAVENOUS PRN
Status: DISCONTINUED | OUTPATIENT
Start: 2025-01-13 | End: 2025-01-13 | Stop reason: HOSPADM

## 2025-01-13 RX ORDER — NALOXONE HYDROCHLORIDE 0.4 MG/ML
0.1 INJECTION, SOLUTION INTRAMUSCULAR; INTRAVENOUS; SUBCUTANEOUS
Status: DISCONTINUED | OUTPATIENT
Start: 2025-01-13 | End: 2025-01-13 | Stop reason: HOSPADM

## 2025-01-13 RX ORDER — BISACODYL 10 MG
10 SUPPOSITORY, RECTAL RECTAL DAILY PRN
Status: DISCONTINUED | OUTPATIENT
Start: 2025-01-13 | End: 2025-01-14 | Stop reason: HOSPADM

## 2025-01-13 RX ORDER — FENTANYL CITRATE 50 UG/ML
25 INJECTION, SOLUTION INTRAMUSCULAR; INTRAVENOUS EVERY 5 MIN PRN
Status: DISCONTINUED | OUTPATIENT
Start: 2025-01-13 | End: 2025-01-13 | Stop reason: HOSPADM

## 2025-01-13 RX ORDER — SCOPOLAMINE 1 MG/3D
1 PATCH, EXTENDED RELEASE TRANSDERMAL
Status: DISCONTINUED | OUTPATIENT
Start: 2025-01-13 | End: 2025-01-14 | Stop reason: HOSPADM

## 2025-01-13 RX ORDER — FAMOTIDINE 20 MG/1
20 TABLET, FILM COATED ORAL 2 TIMES DAILY
Status: DISCONTINUED | OUTPATIENT
Start: 2025-01-13 | End: 2025-01-14 | Stop reason: HOSPADM

## 2025-01-13 RX ADMIN — ONDANSETRON 4 MG: 2 INJECTION, SOLUTION INTRAMUSCULAR; INTRAVENOUS at 10:36

## 2025-01-13 RX ADMIN — FENTANYL CITRATE 25 MCG: 50 INJECTION INTRAMUSCULAR; INTRAVENOUS at 08:26

## 2025-01-13 RX ADMIN — FAMOTIDINE 20 MG: 20 TABLET, FILM COATED ORAL at 20:34

## 2025-01-13 RX ADMIN — DEXAMETHASONE SODIUM PHOSPHATE 4 MG: 4 INJECTION, SOLUTION INTRA-ARTICULAR; INTRALESIONAL; INTRAMUSCULAR; INTRAVENOUS; SOFT TISSUE at 10:45

## 2025-01-13 RX ADMIN — DEXAMETHASONE SODIUM PHOSPHATE 5 MG: 4 INJECTION, SOLUTION INTRA-ARTICULAR; INTRALESIONAL; INTRAMUSCULAR; INTRAVENOUS; SOFT TISSUE at 07:49

## 2025-01-13 RX ADMIN — CELECOXIB 400 MG: 100 CAPSULE ORAL at 06:54

## 2025-01-13 RX ADMIN — MIDAZOLAM 1 MG: 1 INJECTION INTRAMUSCULAR; INTRAVENOUS at 07:35

## 2025-01-13 RX ADMIN — ONDANSETRON 4 MG: 2 INJECTION, SOLUTION INTRAMUSCULAR; INTRAVENOUS at 14:23

## 2025-01-13 RX ADMIN — FENTANYL CITRATE 25 MCG: 50 INJECTION INTRAMUSCULAR; INTRAVENOUS at 07:35

## 2025-01-13 RX ADMIN — Medication 2 G: at 07:41

## 2025-01-13 RX ADMIN — FENTANYL CITRATE 25 MCG: 50 INJECTION INTRAMUSCULAR; INTRAVENOUS at 08:09

## 2025-01-13 RX ADMIN — ASPIRIN 325 MG: 325 TABLET ORAL at 17:29

## 2025-01-13 RX ADMIN — BUPIVACAINE HYDROCHLORIDE AND EPINEPHRINE BITARTRATE 20 ML: 5; .005 INJECTION, SOLUTION PERINEURAL at 10:45

## 2025-01-13 RX ADMIN — TRANEXAMIC ACID 1950 MG: 650 TABLET ORAL at 06:54

## 2025-01-13 RX ADMIN — SCOPALAMINE 1 PATCH: 1 PATCH, EXTENDED RELEASE TRANSDERMAL at 07:45

## 2025-01-13 RX ADMIN — PROCHLORPERAZINE EDISYLATE 5 MG: 5 INJECTION INTRAMUSCULAR; INTRAVENOUS at 10:50

## 2025-01-13 RX ADMIN — ACETAMINOPHEN 975 MG: 325 TABLET, FILM COATED ORAL at 06:54

## 2025-01-13 RX ADMIN — MIDAZOLAM 1 MG: 1 INJECTION INTRAMUSCULAR; INTRAVENOUS at 07:28

## 2025-01-13 RX ADMIN — FENTANYL CITRATE 25 MCG: 50 INJECTION INTRAMUSCULAR; INTRAVENOUS at 07:38

## 2025-01-13 RX ADMIN — FAMOTIDINE 20 MG: 10 INJECTION, SOLUTION INTRAVENOUS at 12:51

## 2025-01-13 RX ADMIN — SODIUM CHLORIDE, POTASSIUM CHLORIDE, SODIUM LACTATE AND CALCIUM CHLORIDE: 600; 310; 30; 20 INJECTION, SOLUTION INTRAVENOUS at 07:16

## 2025-01-13 RX ADMIN — CEFAZOLIN 2 G: 10 INJECTION, POWDER, FOR SOLUTION INTRAVENOUS at 16:37

## 2025-01-13 RX ADMIN — BUPIVACAINE HYDROCHLORIDE IN DEXTROSE 1.8 ML: 7.5 INJECTION, SOLUTION SUBARACHNOID at 07:39

## 2025-01-13 RX ADMIN — ACETAMINOPHEN 975 MG: 325 TABLET, FILM COATED ORAL at 14:25

## 2025-01-13 RX ADMIN — CEFAZOLIN 2 G: 10 INJECTION, POWDER, FOR SOLUTION INTRAVENOUS at 23:54

## 2025-01-13 RX ADMIN — ACETAMINOPHEN 975 MG: 325 TABLET, FILM COATED ORAL at 23:53

## 2025-01-13 RX ADMIN — PROCHLORPERAZINE EDISYLATE 5 MG: 5 INJECTION INTRAMUSCULAR; INTRAVENOUS at 11:11

## 2025-01-13 RX ADMIN — PROPOFOL 20 MG: 10 INJECTION, EMULSION INTRAVENOUS at 10:10

## 2025-01-13 RX ADMIN — PHENYLEPHRINE HYDROCHLORIDE 0.5 MCG/KG/MIN: 10 INJECTION INTRAVENOUS at 07:40

## 2025-01-13 RX ADMIN — ONDANSETRON 4 MG: 2 INJECTION INTRAMUSCULAR; INTRAVENOUS at 07:28

## 2025-01-13 RX ADMIN — SENNOSIDES AND DOCUSATE SODIUM 1 TABLET: 50; 8.6 TABLET ORAL at 20:34

## 2025-01-13 RX ADMIN — PROPOFOL 30 MG: 10 INJECTION, EMULSION INTRAVENOUS at 07:45

## 2025-01-13 RX ADMIN — PROPOFOL 75 MCG/KG/MIN: 10 INJECTION, EMULSION INTRAVENOUS at 07:39

## 2025-01-13 RX ADMIN — SODIUM CHLORIDE, POTASSIUM CHLORIDE, SODIUM LACTATE AND CALCIUM CHLORIDE: 600; 310; 30; 20 INJECTION, SOLUTION INTRAVENOUS at 08:17

## 2025-01-13 RX ADMIN — SODIUM CHLORIDE, POTASSIUM CHLORIDE, SODIUM LACTATE AND CALCIUM CHLORIDE: 600; 310; 30; 20 INJECTION, SOLUTION INTRAVENOUS at 14:27

## 2025-01-13 RX ADMIN — PROPOFOL 20 MG: 10 INJECTION, EMULSION INTRAVENOUS at 10:11

## 2025-01-13 ASSESSMENT — ACTIVITIES OF DAILY LIVING (ADL)
ADLS_ACUITY_SCORE: 30
DOING_ERRANDS_INDEPENDENTLY_DIFFICULTY: NO
ADLS_ACUITY_SCORE: 30
ADLS_ACUITY_SCORE: 18
DIFFICULTY_EATING/SWALLOWING: NO
EQUIPMENT_CURRENTLY_USED_AT_HOME: CANE, STRAIGHT
ADLS_ACUITY_SCORE: 18
ADLS_ACUITY_SCORE: 18
WEAR_GLASSES_OR_BLIND: YES
ADLS_ACUITY_SCORE: 30
HEARING_DIFFICULTY_OR_DEAF: NO
ADLS_ACUITY_SCORE: 30
ADLS_ACUITY_SCORE: 18
ADLS_ACUITY_SCORE: 22
CONCENTRATING,_REMEMBERING_OR_MAKING_DECISIONS_DIFFICULTY: NO
ADLS_ACUITY_SCORE: 30
ADLS_ACUITY_SCORE: 22
CHANGE_IN_FUNCTIONAL_STATUS_SINCE_ONSET_OF_CURRENT_ILLNESS/INJURY: NO
DRESSING/BATHING_DIFFICULTY: NO
VISION_MANAGEMENT: GLASSES
ADLS_ACUITY_SCORE: 30
WALKING_OR_CLIMBING_STAIRS_DIFFICULTY: NO
FALL_HISTORY_WITHIN_LAST_SIX_MONTHS: NO
ADLS_ACUITY_SCORE: 18
ADLS_ACUITY_SCORE: 18
TOILETING_ISSUES: NO
ADLS_ACUITY_SCORE: 27
ADLS_ACUITY_SCORE: 18
DIFFICULTY_COMMUNICATING: NO

## 2025-01-13 NOTE — ANESTHESIA PROCEDURE NOTES
"Intrathecal Procedure Note    Pre-Procedure   Staff -        CRNA: Rory Huerta APRN CRNA       Other Anesthesia Staff: Kari Colón       Performed By: CRNA and FABIEN       Location: OR       Pre-Anesthestic Checklist: patient identified, IV checked, risks and benefits discussed, informed consent, monitors and equipment checked and pre-op evaluation  Timeout:       Correct Patient: Yes        Correct Procedure: Yes        Correct Site: Yes        Correct Position: Yes   Procedure Documentation  Procedure: intrathecal         Patient Position: sitting       Patient Prep/Sterile Barriers: sterile gloves, mask, patient draped       Skin prep: Betadine       Insertion Site: L3-4. (midline approach).       Needle Gauge: 25.        Needle Length (Inches): 3.5        Spinal Needle Type: Pencan       Introducer used       Introducer: 20 G       # of attempts: 1 and  # of redirects:  1    Assessment/Narrative         Paresthesias: No.       CSF fluid: clear.       Opening pressure was cmH2O while  Sitting.       Comments:  Patient appeared to tolerate well. No paresthesias. CSF clear.      FOR [facility] ([facility]) ONLY:   Pain Team Contact information: please page the Pain Team Via [employer]. Search \"Pain\". During daytime hours, please page the attending first. At night please page the resident first.      "

## 2025-01-13 NOTE — PROGRESS NOTES
S-(situation): Patient registered to Observation. Patient arrived to room 273 via cart from PACU at 1153 and was transferred to bed per air nelsy.     B-(background): R TKA    A-(assessment): Ace dressing to right leg CDI. Cool right and left feet. Numbness present and denies pain, but states she is a bit uncomfortable with pressure. IV fluids infusing. SCD's and capnography in place. Nauseous with any movement and had emesis of 50ml. Ice chips and cool cloth to forehead relieved. Sleepy, but oriented x4.     R-(recommendations): Orders and observation goals reviewed with patient.    Nursing Observation criteria listed below was met:    Skin issues/needs documented:Yes  Isolation needs addressed and Signage up: NA  Fall Prevention: Education given and documented: Yes  Education Assessment documented:Yes  Admission Education Documented: Yes  New medication patient education completed and documented (Possible Side Effects of Common Medications handout): Yes  OBS video/handout Reviewed & Documented: Yes  Allergies Reviewed: Yes  Medication Reconciliation Complete: Yes  Home medications if not able to send immediately home with family stored here: NA  Reminder note placed in discharge instructions of home meds: NA  Patient has discharge needs (If yes, please explain): Yes, outpatient rehab  Patient discharge preferences addressed and charted on white board:  Yes  Provider notified that patient has arrived to the unit: Yes

## 2025-01-13 NOTE — OP NOTE
REPORT OF OPERATION/ PROCEDURE    DATE OF PROCEDURE: 1/13/2025     SURGEON: Lukas Walden MD     FIRST ASSISTANT:  JAYY Ramachandran     PREOPERATIVE DIAGNOSIS: right knee osteoarthritis.     POSTOPERATIVE DIAGNOSIS:  right  knee osteoarthritis.     PROCEDURE: right total knee arthroplasty.     ANESTHESIA: Anesthesia:   Spinal with Adductor Block    INDICATIONS: Brittnee Herron is a 59 year old female  with severe right  knee pain, with severe osteoarthritis on the x-rays involving mainly the medial compartment, with degenerative changes seen in the patellofemoral compartment and laterally.  Patient had failed conservative treatment.  Informed consent was obtained for the procedure.     DESCRIPTION OF PROCEDURE: The patient was taken to the operating room, placed on the operating table in supine position, and spinal anesthesia was successfully achieved. she had a minimal flexion contracture, and flexion was full.  A tourniquet was placed around the proximal thigh. The limb was prepped and draped in the usual sterile fashion. Pause for site confirmation was performed. The limb was exsanguinated, and tourniquet inflated to 250 mmHg. A longitudinal incision was made centered on the patella with the knee flexed. The incision was taken down through the skin and subcutaneous tissue to the level of the extensor mechanism which was incised in a manner of the median parapatellar approach. Patella was everted. Fat pad was excised. Osteophytes removed. ACL detached, PCL preserved. A medial release was performed off the tibia along the joint line. Then, the anterior portions of the menisci were removed. There was a thickened deep MCL.     Operative Findings: Medial compartment: complete articular cartilage loss.  Lateral compartment: moderate articular cartilage loss.  Patellofemoral joint: areas of complete articular cartilage loss.  Large medial and small lateral femoral osteophytes were found and removed. . There  was a 1cm cyst in the middle-posterior tibial bone with a creamy, white thick contents.  This was likely a necrotic cyst, but tissue and the fluid were sent to the lab.  Stat gram stain was negative.  Then, using an external tibial alignment guide, we set our cutting block in the appropriate varus-valgus position, appropriate posterior slope and depth, based on referencing the lateral side.. The guide was secured to the tibia. The cut surface of the tibia was removed and measured on the backtable.  The cyst of the posterior tibia was curretted to obtain the aforementioned culture.  We then turned our attention towards the femur. The medullary drill was used to enter the femoral canal. Then, distal femoral cut was made at 5 degrees of valgus. Then the footed guide was placed on the cut end of the femur. We set the external rotation to 3 degrees and sized the femur. I double checked this based on the epicondylar axis and Whitesides line. We then made the anterior cut as well as the posterior cut and chamfer cuts for the size 7 femur.   The remaining meniscal tissue was removed. There were no posterior ostephytes.     I placed the femoral and tibial trials into place. I felt we had very good extension and stability as well as good flexion without liftoff or a squeak in flexion with a size 10 spacer. I drilled the lug holes for the femoral component.    We then removed the trials and measured for the tibial side. It measured size E.  I drilled then punched for the stemmed press fit tibia. We then prepared the patella by removing 8 mm of patella using the patellar reamer. We then drilled the lug holes for the size 32 patellar component.  Joint block was injected into the posterior capsular structures along the posterior femoral periosteum with the knee flexed. We then  washed all of the surfaces, and dried the patellar surface. I then cemented the patella using Simplex antibiotic cement. I removed the excess cement. A  bone graft was fashioned from the the femoral chamfer bone and impacted into the cyst defect after the STAT gram stain came back negative for organisms.  I impacted the femoral and tibial components, press fit, with good fit and stability.  Once the patellar cement had cured, I reduced the knee and put it through a range of motion and tested the stability. I felt that the size 10 MC spacer was the best size.     As we had several times prior in the case, we thoroughly washed the knee with antibiotic saline solution using the pulsavac.  Then, 0.3% Betadine was poured into the knee, and allowed to sit for 2 minutes, and the knee rinsed again.  We checked to make sure that all of the cured extraneous cement was removed.  Joint block was injected into medial and lateral capsule, medial and lateral tibial sub-periosteal, and surrounding soft tissues. The actual spacer was then placed.  Tracking was checked.     Lateral retinacular release not needed..    The remaining joint block was then injected in the previously uninjected areas, medially, laterally, the anterior structures and into the subcutaneous tissues as well. I closed the extensor mechanism using #1 Ethibond sutures, each placed in a figure-of-eight fashion in the proximal two-thirds of the extensor mechanism, and a running #1 Vicryl suture distally.  I oversewed with #1 Stratofix along the entire extensor mechanism.  TXA 1 gm diluted in 50cc of saline was injected into the knee along with the remaining joint block with epi, and then the tourniquet was deflated.  2-0 Vicryl suture was used in the subcutaneous layer, followed by running 3-0 Monocryl suture in the subcuticular layer, and Dermabond on the skin. Sterile dressings were applied. The patient was awakened, and transferred to the hospital cart and to the recovery area in satisfactory condition where a femoral nerve block was to be done.    EBL:  20cc.     Complications:  None     Specimen(s) :   none    Prosthetic Devices: Radha Persona Femur UNcemented size 7, CR.  Tibia: UNcemented size E, stemmed.  Spacer 10 mm, MC; patella size 32 mm cemented.    PLAN: Weightbearing as tolerated. Routine total knee protocol.     ALESIA MARADIAGA MD

## 2025-01-13 NOTE — PROGRESS NOTES
01/13/25 1422   Appointment Info   Signing Clinician's Name / Credentials (PT) Smith Laurent PT, DPT, ATC, LAT and Domenica Culp SPT   Student Supervision Therapy services provided with the co-signing licensed therapist guiding and directing the services, and providing the skilled judgement and assessment throughout the session;Direct Patient Contact Provided;Line of sight supervision provided;Direct supervision provided   Rehab Comments (PT) PT eval and treat post op knee. Activity order: up with assist, ambulate, up in chair, ice, elevate, position at rest, IS, immobilizer on when up, WBAT       Present no   Living Environment   People in Home spouse;child(zo), adult   Current Living Arrangements house   Home Accessibility stairs to enter home;stairs within home   Number of Stairs, Main Entrance 2   Stair Railings, Main Entrance none   Number of Stairs, Within Home, Primary greater than 10 stairs  (does not have to access)   Transportation Anticipated family or friend will provide   Living Environment Comments main level bedroom and bathroom.  able to support care needs. flat bed, step over tub.   Self-Care   Usual Activity Tolerance good   Current Activity Tolerance moderate   Regular Exercise No   Equipment Currently Used at Home walker, standard;cane, straight;shower chair   Fall history within last six months no   Activity/Exercise/Self-Care Comment IND at baseline. 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   General Information   Onset of Illness/Injury or Date of Surgery 01/13/25   Referring Physician Lukas Walden MD   Patient/Family Therapy Goals Statement (PT) go home   Pertinent History of Current Problem (include personal factors and/or comorbidities that impact the POC) Patient is a 59 year old female, day 0 status post R TKA by  Dr. Walden, spinal with adductor block, EBL 20cc. Patient with previous medical history of HAD,  recurrent major depressive disorder, hypertension, primary and OA L knee.   Existing Precautions/Restrictions brace worn when out of bed;fall   Weight-Bearing Status - LUE full weight-bearing   Weight-Bearing Status - RUE full weight-bearing   Weight-Bearing Status - LLE full weight-bearing   Weight-Bearing Status - RLE weight-bearing as tolerated   General Observations nauseous, recent medication provided to manage. IV infusing. CAPNO on on room air   Cognition   Affect/Mental Status (Cognition) WFL   Orientation Status (Cognition) oriented to;person;place;situation;verbal cues/prompts needed for orientation   Follows Commands (Cognition) WFL   Pain Assessment   Patient Currently in Pain Yes, see Vital Sign flowsheet   Integumentary/Edema   Integumentary/Edema Comments post op dressing CDI   Posture    Posture Forward head position   Range of Motion (ROM)   ROM Comment 0-50* right knee AROM   Strength (Manual Muscle Testing)   Strength Comments good quad set and short arc quad.   Bed Mobility   Comment, (Bed Mobility) up in recliner upon arrival, will assess in future sessions   Transfers   Transfers sit-stand transfer   Sit-Stand Transfer   Sit-Stand [place] (Transfers) modified independence   Assistive Device (Sit-Stand Transfers) walker, standard   Comment, (Sit-Stand Transfer) CAM brace on R LE during STS trasnfer and able to bear weight on both LE.   Gait/Stairs (Locomotion)   Comment, (Gait/Stairs) Due to BP elevated from sitting (143/95) to standing (154/92) will assess in future sessions.   Balance   Balance Comments Able to maintain standing balance using FWW   Sensory Examination   Sensory Perception patient reports no sensory changes   Coordination   Coordination Comments Able to coordinate STS transfer independently using a FWW   Muscle Tone   Muscle Tone Comments Decrease in muscle tone due to decrease in strength   Clinical Impression   Criteria for Skilled Therapeutic Intervention Yes, treatment  indicated   PT Diagnosis (PT) impaired mobility, muscle weakness, joint stiffness   Influenced by the following impairments day 0 status post R TKA   Functional limitations due to impairments use of walker, fatigue, pain, decreased activity tolerance   Clinical Presentation (PT Evaluation Complexity) evolving   Clinical Presentation Rationale post op acuity, medical status, clinical judgement   Clinical Decision Making (Complexity) moderate complexity   Planned Therapy Interventions (PT) balance training;bed mobility training;cryotherapy;gait training;home exercise program;patient/family education;ROM (range of motion);stair training;strengthening;transfer training;progressive activity/exercise;home program guidelines   Risk & Benefits of therapy have been explained evaluation/treatment results reviewed;participants voiced agreement with care plan;patient;participants included;spouse/significant other   Clinical Impression Comments Patient demonstrates functional mobility impairements consistent with post op TKA and post op acuity. Patient would benefit from continued skilled IP PT interventions as well as progressive mobility with nursing in order to progress to home safely. Anticipate she will do well with return home with family support and OP PT to progress her mobility, strength and safety in accordance with her surgeon's protocol.   PT Total Evaluation Time   PT Walter Low Complexity Minutes (86853) 10   Physical Therapy Goals   PT Frequency Daily   PT Predicted Duration/Target Date for Goal Attainment 01/15/25   PT Goals Bed Mobility;Transfers;Gait;Stairs   PT: Bed Mobility Independent;Supine to/from sit   PT: Transfers Modified independent;Sit to/from stand;Assistive device   PT: Gait Modified independent;Rolling walker;150 feet;Within precautions   PT: Stairs Minimal assist;2 stairs;Assistive device;Within precautions   PT Discharge Planning   PT Plan daily. ambulate, exercise, stairs   PT Discharge  Recommendation (DC Rec) home with assist;home with outpatient physical therapy   PT Rationale for DC Rec Patient from home with  and son, IND in mobility at baseline with no recent falls. Stairs to enter the home without railing, main level living. Has a walker for use upon discharge. Patient demonstrates functional mobility impairements consistent with post op TKA and post op acuity. Patient would benefit from continued skilled IP PT interventions as well as progressive mobility with nursing in order to progress to home safely. Anticipate she will do well with return home with family support and OP PT to progress her mobility, strength and safety in accordance with her surgeon's protocol.   PT Brief overview of current status HTN and nausea limiting mobility. HEP well tolerated. MOD IND sit to stand with walker   Physical Therapy Time and Intention   Total Session Time (sum of timed and untimed services) 10     Thank you for your referral.  Suzan Laurent, PT, DPT, ATC, LAT    [facility]  O: [phone]  E: [email or web address]

## 2025-01-13 NOTE — ANESTHESIA PROCEDURE NOTES
Adductor canal Procedure Note    Pre-Procedure   Staff -        CRNA: Quinn Guajardo APRN CRNA       Other Anesthesia Staff: Kari Colón       Performed By: CRNA and FABIEN       Location: post-op       Procedure Start/Stop Times: 1/13/2025 10:33 AM and 1/13/2025 10:45 AM       Pre-Anesthestic Checklist: patient identified, IV checked, site marked, risks and benefits discussed, informed consent, monitors and equipment checked, pre-op evaluation, at physician/surgeon's request and post-op pain management  Timeout:       Correct Patient: Yes        Correct Procedure: Yes        Correct Site: Yes        Correct Position: Yes        Correct Laterality: Yes        Site Marked: Yes  Procedure Documentation  Procedure: Adductor canal         Laterality: right       Patient Position: supine       Patient Prep/Sterile Barriers: sterile gloves, mask       Skin prep: Chloraprep       Local skin infiltrated with 3 mL of 2% lidocaine.        Needle Type: insulated       Needle Gauge: 22.        Needle Length (millimeters): 100        Ultrasound guided       1. Ultrasound was used to identify targeted nerve, plexus, vascular marker, or fascial plane and place a needle adjacent to it in real-time.       2. Ultrasound was used to visualize the spread of anesthetic in close proximity to the above referenced structure.       3. A permanent image is entered into the patient's record.       4. The visualized anatomic structures appeared normal.       5. There were no apparent abnormal pathologic findings.    Assessment/Narrative         The placement was negative for: blood aspirated, painful injection and site bleeding       Paresthesias: No.       Test dose of mL at.         Test dose negative, 3 minutes after injection, for signs of intravascular, subdural, or intrathecal injection.       Bolus given via needle..        Secured via.        Insertion/Infusion Method: Single Shot       Complications: none       Injection made  "incrementally with aspirations every 5 mL.    Medication(s) Administered   Medication Administration Time: 1/13/2025 10:33 AM     Comments:  Placed by the SRNA with the CRNA.  Good visualization of the femoral artery and the local spread lateral to the artery.  No HR increase with injections and no other complications noted.  I will follow up with the pt if needed.      FOR [facility] ([facility]) ONLY:   Pain Team Contact information: please page the Pain Team Via Pops. Search \"Pain\". During daytime hours, please page the attending first. At night please page the resident first.      "

## 2025-01-13 NOTE — OR NURSING
Transfer from PACU to Room Med/Surg  Transferred to bed via glyder mat (Glyder Mat,Transfer Boar,Slider Sheet)      S: 60 y/o female S/P right total knee arthroplasty  Anesthesia Type: spinal with propofol infusion  Surgeon: Dr. Walden  Allergies: See Medication Reconciliation Record    B: Pertinent Medical History:   Past Medical History:   Diagnosis Date    Depressive disorder, not elsewhere classified 01/01/1982    off meds for a year    PONV (postoperative nausea and vomiting)        Surgical History:   Past Surgical History:   Procedure Laterality Date    ARTHROSCOPY KNEE WITH MEDIAL MENISCECTOMY Left 07/18/2017    Procedure: ARTHROSCOPY KNEE WITH MEDIAL MENISCECTOMY;  left knee arthroscopy with partial medial menisectomy;  Surgeon: Brittnee Bean MD;  Location: [facility] OR    COLONOSCOPY N/A 04/12/2023    Procedure: COLONOSCOPY;  Surgeon: Armando Saunders MD;  Location: [facility] GI    HC REMOVAL OF OVARIAN CYST(S)  01/01/1990    laparoscopic    HC REMOVE TONSILS/ADENOIDS,<13 Y/O      ZZC LIGATE FALLOPIAN TUBE,POSTPARTUM  08/03/2002    Postpartum bilateral tubal ligation with partial salpingectomy through a mini laparotomy       DNR/DNI      A: EBL: 20  IVF: 750  UOP: no void in PACU  NPO: ___Yes __x_No ice chips tolerated  Vomiting: __x_Yes ___No Scopalomine patch behind right earTreated with IV Zofran and Compazine. Pt has Hx PONV  Drainage: none  Skin Integrity: new surgical incision to right knee (Normal; Pressure Ulcer (Location)  Pain: 0  On 1-10 Scale  CRNA and SRNA gave pt adductor canal block  See PACU record for ongoing assessment, vital signs and pain assessment.    RFO (Retained Foreign Object) ___Yes__x_No (identify item if present)   Brace/sling/equipment: _x__Yes___No (identify item if present) knee immobilizer    Report Given to:  Kat PELAYO RN    R: Post-Op vitals and assessments as ordered/indicated per patient's condition.  Follow Post-Op orders and notify Physician prn.  Continue to involve  patient/family in plan of care and discharge planning.  Reinforce Pre-Operative education.  Implement skin safety interventions as appropriate.

## 2025-01-13 NOTE — ANESTHESIA CARE TRANSFER NOTE
Patient: Brittnee Herron    Procedure: Procedure(s):  RIGHT TOTAL KNEE ARTHROPLASTY       Diagnosis: Primary osteoarthritis of right knee [M17.11]  Diagnosis Additional Information: No value filed.    Anesthesia Type:   Spinal     Note:    Oropharynx: oropharynx clear of all foreign objects and spontaneously breathing  Level of Consciousness: drowsy  Oxygen Supplementation: face mask    Independent Airway: airway patency satisfactory and stable  Dentition: dentition unchanged  Vital Signs Stable: post-procedure vital signs reviewed and stable  Report to RN Given: handoff report given  Patient transferred to: PACU    Handoff Report: Identifed the Patient, Identified the Reponsible Provider, Reviewed the pertinent medical history, Discussed the surgical course, Reviewed Intra-OP anesthesia mangement and issues during anesthesia, Set expectations for post-procedure period and Allowed opportunity for questions and acknowledgement of understanding      Vitals:  Vitals Value Taken Time   BP     Temp     Pulse     Resp     SpO2 97 % 01/13/25 1026   Vitals shown include unfiled device data.    Electronically Signed By: CLEMENTE Fernandes CRNA  January 13, 2025  10:27 AM

## 2025-01-13 NOTE — ANESTHESIA PREPROCEDURE EVALUATION
Anesthesia Pre-Procedure Evaluation    Patient: Brittnee Herron   MRN: [id number] : 1965        Procedure : Procedure(s):  RIGHT TOTAL KNEE ARTHROPLASTY          Past Medical History:   Diagnosis Date    Depressive disorder, not elsewhere classified 1982    off meds for a year      Past Surgical History:   Procedure Laterality Date    ARTHROSCOPY KNEE WITH MEDIAL MENISCECTOMY Left 2017    Procedure: ARTHROSCOPY KNEE WITH MEDIAL MENISCECTOMY;  left knee arthroscopy with partial medial menisectomy;  Surgeon: Brittnee Bean MD;  Location: [facility]    COLONOSCOPY N/A 2023    Procedure: COLONOSCOPY;  Surgeon: Armando Saunders MD;  Location: [facility]    HC REMOVAL OF OVARIAN CYST(S)  1990    laparoscopic    HC REMOVE TONSILS/ADENOIDS,<11 Y/O      ZZC LIGATE FALLOPIAN TUBE,POSTPARTUM  2002    Postpartum bilateral tubal ligation with partial salpingectomy through a mini laparotomy      Allergies   Allergen Reactions    Lisinopril Cough      Social History     Tobacco Use    Smoking status: Never    Smokeless tobacco: Never   Substance Use Topics    Alcohol use: No      Wt Readings from Last 1 Encounters:   25 89.9 kg (198 lb 3.2 oz)        Anesthesia Evaluation   Pt has had prior anesthetic. Type: General and Regional.    History of anesthetic complications  - PONV.      ROS/MED HX  ENT/Pulmonary:     (+)     GERMAINE risk factors, snores loudly, hypertension,                                 Neurologic:  - neg neurologic ROS     Cardiovascular:     (+)  hypertension- -   -  - -                                 Previous cardiac testing   Echo: Date: Results:    Stress Test:  Date: 2021 Results:          Interpretation Summary  The patient exercised 7 minutes.  The patient exhibited no chest pain during exercise.  There was a borderline hypertensive BP response to exercise.  The EKG portion of this stress test was negative for inducible ischemia (see  echo results below).  Normal resting  wall motion and no stress-induced wall motion abnormality.  This was a normal stress echocardiogram with no evidence of stress-induced  ischemia.  _____________________________________________________________________________  __     Stress  Exercise was stopped due to fatigue.  There was a borderline hypertensive BP response to exercise.  The patient exhibited no chest pain during exercise.  The patient exercised 7 minutes.  Target Heart Rate was achieved.  The EKG portion of this stress test was negative for inducible ischemia (see  echo results below).  The visual ejection fraction is estimated at >70%.  Left ventricular cavity size decreases with exercise.  Global LV systolic function augments with exercise.  Normal resting wall motion and no stress-induced wall motion abnormality.  This was a normal stress echocardiogram with no evidence of stress-induced  ischemia.     Baseline  sinus rhythm.  No regional wall motion abnormalities noted.  The visual ejection fraction is estimated at 60-65%.     Stress Results                                       Maximum Predicted HR:   163 bpm             Target HR: 139 bpm        % Maximum Predicted HR: 90 %          Stage  DurationHeart Rate  BP                  Comment              (mm:ss)   (bpm)      Stage 1   3:00     122    182/78      Stage 2   4:00     146    190/78RPP- 21803, FAC: Average, Duke Score: 1     RecoveryR  5:10      80    132/78                Stress Duration:   7:00 mm:ss        Recovery Time: 5:10 mm:ss          Maximum Stress HR: 146 bpm           METS:          8     Left Ventricle  Left ventricular systolic function is normal. The visual ejection fraction is  estimated at 60-65%. No regional wall motion abnormalities noted.        Mitral Valve  There is trace mitral regurgitation.     Tricuspid Valve  There is mild (1+) tricuspid regurgitation. The right ventricular systolic  pressure is approximated at 23.0 mmHg plus the right atrial pressure.    "  Aortic Valve  No aortic stenosis is present.  _____________________________    ECG Reviewed:  Date: 2/1/21 Results:  SR  Cath:  Date: Results:      METS/Exercise Tolerance: >4 METS    Hematologic:  - neg hematologic  ROS     Musculoskeletal:   (+)  arthritis,             GI/Hepatic:  - neg GI/hepatic ROS     Renal/Genitourinary:  - neg Renal ROS     Endo:     (+)               Obesity,       Psychiatric/Substance Use:     (+) psychiatric history anxiety and depression       Infectious Disease:  - neg infectious disease ROS     Malignancy:  - neg malignancy ROS     Other:  - neg other ROS   (-) Any chance pregnant       Physical Exam    Airway  airway exam normal      Mallampati: II   TM distance: > 3 FB   Neck ROM: full   Mouth opening: > 3 cm    Respiratory Devices and Support         Dental       (+) Minor Abnormalities - some fillings, tiny chips      Cardiovascular   cardiovascular exam normal       Rhythm and rate: regular and normal     Pulmonary   pulmonary exam normal        breath sounds clear to auscultation           OUTSIDE LABS:  CBC:   Lab Results   Component Value Date    WBC 7.2 02/01/2021    WBC 6.6 11/14/2020    HGB 13.9 02/01/2021    HGB 14.2 11/14/2020    HCT 42.5 02/01/2021    HCT 44.8 11/14/2020     02/01/2021     11/14/2020     BMP:   Lab Results   Component Value Date     01/08/2025     12/18/2023    POTASSIUM 3.6 01/08/2025    POTASSIUM 3.9 12/18/2023    CHLORIDE 101 01/08/2025    CHLORIDE 98 12/18/2023    CO2 27 01/08/2025    CO2 30 (H) 12/18/2023    BUN 17.6 01/08/2025    BUN 22.7 12/18/2023    CR 0.64 01/08/2025    CR 0.85 12/18/2023     (H) 01/08/2025    GLC 84 12/18/2023     COAGS: No results found for: \"PTT\", \"INR\", \"FIBR\"  POC: No results found for: \"BGM\", \"HCG\", \"HCGS\"  HEPATIC:   Lab Results   Component Value Date    ALBUMIN 3.7 02/01/2021    PROTTOTAL 8.0 02/01/2021    ALT 26 02/01/2021    AST 18 02/01/2021    ALKPHOS 84 02/01/2021    BILITOTAL " "0.3 02/01/2021     OTHER:   Lab Results   Component Value Date    PH 7.5 (H) 04/17/2003    BOBBI 9.2 01/08/2025    LIPASE 207 11/14/2020    TSH 4.60 (H) 07/14/2015    T4 0.80 07/14/2015       Anesthesia Plan    ASA Status:  2    NPO Status:  NPO Appropriate    Anesthesia Type: Spinal.   Induction: Intravenous, Propofol.   Maintenance: TIVA.        Consents    Anesthesia Plan(s) and associated risks, benefits, and realistic alternatives discussed. Questions answered and patient/representative(s) expressed understanding.     - Discussed: Risks, Benefits and Alternatives for BOTH SEDATION and the PROCEDURE were discussed     - Discussed with:  Patient            Postoperative Care    Pain management: Peripheral nerve block (Single Shot), Multi-modal analgesia, IV analgesics, Oral pain medications.   PONV prophylaxis: Ondansetron (or other 5HT-3), Dexamethasone or Solumedrol, Background Propofol Infusion     Comments:    Other Comments: The risks and benefits of anesthesia, and the alternatives where applicable, have been discussed with the patient, and they wish to proceed.              Kari Colón    I have reviewed the pertinent notes and labs in the chart from the past 30 days and (re)examined the patient.  Any updates or changes from those notes are reflected in this note.               # Hypertension: Noted on problem list           # Obesity: Estimated body mass index is 33.22 kg/m  as calculated from the following:    Height as of 1/8/25: 1.645 m (5' 4.76\").    Weight as of 1/8/25: 89.9 kg (198 lb 3.2 oz).             "

## 2025-01-13 NOTE — TELEPHONE ENCOUNTER
Please see Mychart encounter from 1/10/25 for additional information.     Sharifa Rodriguez RN   [facility]

## 2025-01-13 NOTE — PROGRESS NOTES
Ambulated to bathroom to void with gait belt, walker, immobilizer, and one assist. Tolerated crackers and some broth, jello, and soup. States she is starting to feel better nausea-wise. Denies pain. Good CMS. Dressing CDI. Scopolamine patch in place. Up in recliner with legs elevated.

## 2025-01-13 NOTE — PLAN OF CARE
Goal Outcome Evaluation:      Plan of Care Reviewed With: patient    Overall Patient Progress: improvingOverall Patient Progress: improving    Outcome Evaluation: Good CMS and dressing CDI. Ice pack used for comfort and swelling. Still some nausea so received Zofran IV. Received scheduled Tylenol for pain in knee rating 2-3/10. Ambulated to bathroom to void 50ml. Some dizziness with change in position. No more emesis, had clear liquids for lunch, and accepted toast now. PT in room working with patient.  at bedside. VSS

## 2025-01-14 VITALS
BODY MASS INDEX: 32.97 KG/M2 | WEIGHT: 193.12 LBS | TEMPERATURE: 98.4 F | DIASTOLIC BLOOD PRESSURE: 74 MMHG | SYSTOLIC BLOOD PRESSURE: 138 MMHG | OXYGEN SATURATION: 95 % | HEART RATE: 89 BPM | HEIGHT: 64 IN | RESPIRATION RATE: 18 BRPM

## 2025-01-14 LAB
FASTING STATUS PATIENT QL REPORTED: NO
GLUCOSE SERPL-MCNC: 116 MG/DL (ref 70–99)
HGB BLD-MCNC: 11.5 G/DL (ref 11.7–15.7)

## 2025-01-14 PROCEDURE — 97116 GAIT TRAINING THERAPY: CPT | Mod: GP | Performed by: PHYSICAL THERAPIST

## 2025-01-14 PROCEDURE — 97530 THERAPEUTIC ACTIVITIES: CPT | Mod: GP | Performed by: PHYSICAL THERAPIST

## 2025-01-14 PROCEDURE — 250N000013 HC RX MED GY IP 250 OP 250 PS 637: Performed by: ORTHOPAEDIC SURGERY

## 2025-01-14 PROCEDURE — 82947 ASSAY GLUCOSE BLOOD QUANT: CPT | Performed by: ORTHOPAEDIC SURGERY

## 2025-01-14 PROCEDURE — 85018 HEMOGLOBIN: CPT | Performed by: ORTHOPAEDIC SURGERY

## 2025-01-14 PROCEDURE — 36415 COLL VENOUS BLD VENIPUNCTURE: CPT | Performed by: ORTHOPAEDIC SURGERY

## 2025-01-14 PROCEDURE — 97110 THERAPEUTIC EXERCISES: CPT | Mod: GP | Performed by: PHYSICAL THERAPIST

## 2025-01-14 PROCEDURE — 258N000003 HC RX IP 258 OP 636: Performed by: ORTHOPAEDIC SURGERY

## 2025-01-14 RX ORDER — ETODOLAC 400 MG/1
400 TABLET, FILM COATED ORAL 2 TIMES DAILY
COMMUNITY
Start: 2025-01-14

## 2025-01-14 RX ADMIN — FAMOTIDINE 20 MG: 20 TABLET, FILM COATED ORAL at 08:28

## 2025-01-14 RX ADMIN — POLYETHYLENE GLYCOL 3350 17 G: 17 POWDER, FOR SOLUTION ORAL at 08:28

## 2025-01-14 RX ADMIN — ASPIRIN 325 MG: 325 TABLET ORAL at 08:28

## 2025-01-14 RX ADMIN — ACETAMINOPHEN 975 MG: 325 TABLET, FILM COATED ORAL at 06:02

## 2025-01-14 RX ADMIN — SODIUM CHLORIDE, POTASSIUM CHLORIDE, SODIUM LACTATE AND CALCIUM CHLORIDE: 600; 310; 30; 20 INJECTION, SOLUTION INTRAVENOUS at 00:35

## 2025-01-14 RX ADMIN — OXYCODONE HYDROCHLORIDE 5 MG: 5 TABLET ORAL at 04:19

## 2025-01-14 RX ADMIN — SENNOSIDES AND DOCUSATE SODIUM 1 TABLET: 50; 8.6 TABLET ORAL at 08:28

## 2025-01-14 RX ADMIN — OXYCODONE HYDROCHLORIDE 5 MG: 5 TABLET ORAL at 08:28

## 2025-01-14 ASSESSMENT — ACTIVITIES OF DAILY LIVING (ADL)
ADLS_ACUITY_SCORE: 30

## 2025-01-14 NOTE — PROGRESS NOTES
"4 hr shift note:     Pt A&O x4. LSC on capnography with RA. Denies pain at this time. LR running at 100 ml/hr. Denies N&V. Dressing CDI with no drainage. Immobilizer and Ice in place. Ambulated to BR x1 with GBW.  BP (!) 145/61 (BP Location: Left arm)   Pulse 103   Temp 98.6  F (37  C) (Oral)   Resp 16   Ht 1.626 m (5' 4\")   Wt 87.6 kg (193 lb 2 oz)   LMP 07/10/2018   SpO2 93%   BMI 33.15 kg/m  .     "

## 2025-01-14 NOTE — ANESTHESIA POSTPROCEDURE EVALUATION
Patient: Brittnee Herron    Procedure: Procedure(s):  RIGHT TOTAL KNEE ARTHROPLASTY       Anesthesia Type:  Spinal    Note:  Disposition: Outpatient   Postop Pain Control: Uneventful            Sign Out: Well controlled pain   PONV: No   Neuro/Psych: Uneventful            Sign Out: Acceptable/Baseline neuro status   Airway/Respiratory: Uneventful            Sign Out: Acceptable/Baseline resp. status   CV/Hemodynamics: Uneventful            Sign Out: Acceptable CV status   Other NRE: NONE   DID A NON-ROUTINE EVENT OCCUR? No    Event details/Postop Comments:  Pt was happy with anesthesia care.  No complications.  I will follow up with the pt if needed.           Last vitals:  Vitals Value Taken Time   /86 01/13/25 1130   Temp 76.64  F (24.8  C) 01/13/25 1143   Pulse 86 01/13/25 1140   Resp 13 01/13/25 1140   SpO2 96 % 01/13/25 1140   Vitals shown include unfiled device data.    Electronically Signed By: CLEMENTE Rainey CRNA  January 14, 2025  2:43 PM

## 2025-01-14 NOTE — PLAN OF CARE
Occupational Therapy: Orders received. Chart reviewed and discussed with care team.? Occupational Therapy not indicated due to support available at home, IND immobilizer management and no concerns of ADLs or cognition.? Defer discharge recommendations to PT.? Will complete orders.     Physical Therapy Discharge Summary    Reason for therapy discharge:    All goals and outcomes met, no further needs identified.    Progress towards therapy goal(s). See goals on Care Plan in [facility] electronic health record for goal details.  Goals met    Therapy recommendation(s):    Patient would benefit from continued skilled therapeutic intervention in order to progress them towards a higher level of function in accordance with their surgeon's protocol.        Thank you for your referral.  Suzan Laurent, PT, DPT, ATC, [facility]  O: [phone]  E: [email or web address]

## 2025-01-14 NOTE — PROGRESS NOTES
S-(situation): Patient discharged to home via wheelchair with family transport    B-(background): Observation goals met yes    A-(assessment): A&Ox4. CMS intact. R knee incision WDL. Up SBA with GBW. Pain managed with scheduled tylenol and 5 mg oxycodone x1. Tolerating PO intake.    R-(recommendations): Discharge instructions reviewed with pt. Listed belongings gathered and returned to patient. yes  Patient Education resolved: Yes  New medications-Pt. Has been educated about reason of use and side effects Yes  Home medications returned to patient Yes  Medication Bin checked and emptied on discharge Yes

## 2025-01-14 NOTE — PROGRESS NOTES
"[facility]    Progress note    59 year old female POD#1 s/p right total Knee Arthroplasty, by Dr. Walden  S: Patient seen at bedside in no apparent distress, alert and pleasant.  I discussed surgery and rehabilitation with the patient.  She denies numbness, tingling or major pain issues. X-rays printed off and given to the patient also explained to the patient.  Patient has been up and ambulating to the restroom without problems.  Discussed discharge most likely this morning.  Patient would like to follow-up in [place] rather than [place].  O: CMS intact right LE, DF/PF intact and 5 out of 5 strength.       Aquacel dressing on, clean and dry with a good seal       Knee immobilizer on and intact       Right leg with minimal swelling and no erythema and slight ecchymosis anterior lateral knee.       Bilateral calves soft and non tender    Vital signs:  Temp: 98.4  F (36.9  C) Temp src: Oral BP: 121/64 Pulse: 80   Resp: 20 SpO2: 93 % O2 Device: None (Room air) Oxygen Delivery: 1 LPM Height: 162.6 cm (5' 4\") Weight: 87.6 kg (193 lb 2 oz)  Estimated body mass index is 33.15 kg/m  as calculated from the following:    Height as of this encounter: 1.626 m (5' 4\").    Weight as of this encounter: 87.6 kg (193 lb 2 oz).      Hemoglobin   Date Value Ref Range Status   01/14/2025 11.5 (L) 11.7 - 15.7 g/dL Final   02/01/2021 13.9 11.7 - 15.7 g/dL Final     No results found for: \"INR\"      A/P:  1. Pain-controlled on Tylenol, oxycodone 5 to 10 mg only 5 mg taken so far.  2. DVT Prophylaxis-Asprin 325mg BID x 4 weeks   3. Weight Bearing Status-WBAT RLE  4. Physical Therapy-recommending home with assist and outpatient physical therapy.  5. Occupational Therapy-to see patient if appropriate/needed  6. Incision-no signs or symptoms of infection.  Ace wrap removed, Aquacel intact.  RN to place knee-high Zia now.  7. Plan-anticipate discharge to home with outpatient physical therapy.  Outpatient physical " therapy starting on 1/17/2025 and patient has a follow-up with Dr. Walden on 1/30/2025 in [place], however the patient would like to have that she needs to follow-up with me in 2 weeks.  I put that order in.  Discussed the patient with charge RN and updated message sent to Dr. Walden.    Armando Sharpe PA-C  1/14/2025

## 2025-01-14 NOTE — PLAN OF CARE
Goal Outcome Evaluation:      Plan of Care Reviewed With: patient    Overall Patient Progress: improvingOverall Patient Progress: improving     Vitals stable on RA. A&Ox4. No C/O pain only numbness in RLE till about 0400, PRN oxycodone given for 5/10 pain. Pt walking, voiding and tolerating oral fluids. CMS present and dressing remains CDI. LR infusing at 100/hr and stopped at 0400. Pt requested to keep immobilizer on while sleeping to prevent knee from bending. Scopolamine patch behind R ear and pt reported no symptoms of N/V.

## 2025-01-14 NOTE — DISCHARGE INSTRUCTIONS
Total Knee Replacement Discharge Instructions    [phone] Bone and Joint Service Line for issues or concerns      General Care:  After surgery you may feel tired/sleepy. This is normal. If you have any question along the way please contact the office. If you feel it is an issue cannot wait for normal office hours, contact the on-call physician. You should not drive or operate machines/equipment until released by your physician to do so.     Bandages:    It s normal to have some blood-tinged fluid on your bandages, this may occur for a few days after being sent home from the hospital. Leave the dressing placed in the hospital for 7 days after surgery.  This is a water resistant dressing so you may shower over this.  After the 7th day, you can remove the dressing and then do daily dressing changes with gauze and tape. You may also notice a few drops of blood from your incision as you begin to move more this is normal. Keep the area clean and dry.      Teds:  MILE stockings you are to try to wear for a full 2 weeks after surgery.  They can be taken off for exercises.  These also help with blood clots.    Bathing:  Do not submerge your incision in water such as a bath or pool. It is ok to shower when you get home over the aquacell water resistant bandage.  You may find in comfortable to get a shower chair for the first month while you continue to heal and get stronger.     Follow up:  Your follow up appointment should already be scheduled. If it s not, please call the office to verify an appointment 10-14 days after surgery.    Diet:  You may not have a full appetite at discharge this should get better. Progress to bland foods such as crackers and bread and finally to your normal diet if you have no problems.  Avoid alcohol when taking narcotic pain medications.      Pain control:  Take your pain medications as prescribed. These medications may make you sleepy. Do not drive, operate equipment, or drink alcohol when  taking these.  You may take Tylenol (Generic name is acetaminophen) as directed on the bottle for additional relief or in place of the prescribed pain medications as your pain gets better.  If the medications cause a reaction such as nausea or skin rash, stop taking them and contact your doctor. Please plan accordingly, pain medications will not be re-filled on the weekends or at night. Call the office during the day if you need more medications.    Blood thinner:  It is very important to take the Aspirin 325 mg twice a day to help prevent blood clots. No medication is perfect, so if you notice a sudden onset of pain/swelling in your calf area call your doctor. If you notice a sudden onset of troubles breathing and/or chest pain call 911.     Icing:  It is common for some swelling, aching and stiffness to occur for awhile after a knee replacement. Apply ice for 20 minutes at a time. For the first 1-2 weeks apply ice 2-3 x a day or more after therapy/exercises.    Walker/crutches:  Use a walker/crutches when you go home. You will transition to the use of a cane and finally to no additional support.     Physical Therapy:  The success of your knee replacement is based on doing physical therapy. You will have some pain and discomfort along the way. If you feel your pain is limiting your progress make sure to take some pain medication prior to your therapy session. If your pain medications are not working talk to your surgeon.   For the first 1-2 weeks after going home you will have in-home physical therapy. The goal is to work on walking and feeling steady.  Usually after your first post-operative follow up you will move to out-patient physical therapy.   If you are going to transitional care, they will work on physical therapy there then you will have home physical therapy when discharged to home, then outpatient physical therapy after a few weeks as explained above.     Activity:  Unless otherwise instructed, you can  weight bear as tolerated with a walker/cane.     Normal findings after surgery:  Numbness and tenderness around the incisions.   You may have bruising around the incisions and down the thigh.   Low grade fevers less than 100.5 degrees Fahrenheit are normal.   You will have some increased pain after your therapy sessions.     When to call the Office:  Temperature greater than 101.5 degrees Fahreheit.  Fever, chills, and increasing pain in the hip.  Excessive drainage from the incisions that include bright red blood.  Drainage from the incisions sites that appear yellow, pus-like, or foul smelling.  Increasing pain the hip not relieved by the prescribed pain medications or ice.  Persistent nausea or vomiting not helped by the Zofran.  Increased pain or swelling in your calf area (in back above your ankle) that wasn t there when in the hospital.  Any other effects you feel are significant.  Call 911 if you experience any chest pain and/or shortness or breath.

## 2025-01-15 ENCOUNTER — THERAPY VISIT (OUTPATIENT)
Dept: PHYSICAL THERAPY | Facility: CLINIC | Age: 60
End: 2025-01-15
Attending: ORTHOPAEDIC SURGERY
Payer: COMMERCIAL

## 2025-01-15 DIAGNOSIS — M17.11 PRIMARY OSTEOARTHRITIS OF RIGHT KNEE: ICD-10-CM

## 2025-01-15 PROCEDURE — 97112 NEUROMUSCULAR REEDUCATION: CPT | Mod: GP | Performed by: PHYSICAL THERAPIST

## 2025-01-15 PROCEDURE — 97161 PT EVAL LOW COMPLEX 20 MIN: CPT | Mod: GP | Performed by: PHYSICAL THERAPIST

## 2025-01-15 PROCEDURE — 97530 THERAPEUTIC ACTIVITIES: CPT | Mod: GP | Performed by: PHYSICAL THERAPIST

## 2025-01-15 PROCEDURE — 97110 THERAPEUTIC EXERCISES: CPT | Mod: GP | Performed by: PHYSICAL THERAPIST

## 2025-01-15 ASSESSMENT — ACTIVITIES OF DAILY LIVING (ADL)
GO DOWN STAIRS: ACTIVITY IS SOMEWHAT DIFFICULT
HOW_WOULD_YOU_RATE_THE_OVERALL_FUNCTION_OF_YOUR_KNEE_DURING_YOUR_USUAL_DAILY_ACTIVITIES?: ABNORMAL
GIVING WAY, BUCKLING OR SHIFTING OF KNEE: I DO NOT HAVE THE SYMPTOM
STAND: ACTIVITY IS NOT DIFFICULT
KNEE_ACTIVITY_OF_DAILY_LIVING_SCORE: 57.14
LIMPING: I HAVE THE SYMPTOM BUT IT DOES NOT AFFECT MY ACTIVITY
KNEEL ON THE FRONT OF YOUR KNEE: I AM UNABLE TO DO THE ACTIVITY
GO UP STAIRS: ACTIVITY IS VERY DIFFICULT
HOW_WOULD_YOU_RATE_THE_OVERALL_FUNCTION_OF_YOUR_KNEE_DURING_YOUR_USUAL_DAILY_ACTIVITIES?: ABNORMAL
PAIN: THE SYMPTOM AFFECTS MY ACTIVITY MODERATELY
AS_A_RESULT_OF_YOUR_KNEE_INJURY,_HOW_WOULD_YOU_RATE_YOUR_CURRENT_LEVEL_OF_DAILY_ACTIVITY?: NEARLY NORMAL
SWELLING: I HAVE THE SYMPTOM BUT IT DOES NOT AFFECT MY ACTIVITY
SIT WITH YOUR KNEE BENT: I AM UNABLE TO DO THE ACTIVITY
RISE FROM A CHAIR: ACTIVITY IS SOMEWHAT DIFFICULT
AS_A_RESULT_OF_YOUR_KNEE_INJURY,_HOW_WOULD_YOU_RATE_YOUR_CURRENT_LEVEL_OF_DAILY_ACTIVITY?: NEARLY NORMAL
STIFFNESS: I HAVE THE SYMPTOM BUT IT DOES NOT AFFECT MY ACTIVITY
RISE FROM A CHAIR: ACTIVITY IS SOMEWHAT DIFFICULT
KNEE_ACTIVITY_OF_DAILY_LIVING_SUM: 40
WALK: ACTIVITY IS SOMEWHAT DIFFICULT
GIVING WAY, BUCKLING OR SHIFTING OF KNEE: I DO NOT HAVE THE SYMPTOM
STIFFNESS: I HAVE THE SYMPTOM BUT IT DOES NOT AFFECT MY ACTIVITY
GO DOWN STAIRS: ACTIVITY IS SOMEWHAT DIFFICULT
PAIN: THE SYMPTOM AFFECTS MY ACTIVITY MODERATELY
SIT WITH YOUR KNEE BENT: I AM UNABLE TO DO THE ACTIVITY
WEAKNESS: THE SYMPTOM AFFECTS MY ACTIVITY SLIGHTLY
KNEEL ON THE FRONT OF YOUR KNEE: I AM UNABLE TO DO THE ACTIVITY
WALK: ACTIVITY IS SOMEWHAT DIFFICULT
LIMPING: I HAVE THE SYMPTOM BUT IT DOES NOT AFFECT MY ACTIVITY
GO UP STAIRS: ACTIVITY IS VERY DIFFICULT
PLEASE_INDICATE_YOR_PRIMARY_REASON_FOR_REFERRAL_TO_THERAPY:: KNEE
SQUAT: ACTIVITY IS SOMEWHAT DIFFICULT
RAW_SCORE: 40
WEAKNESS: THE SYMPTOM AFFECTS MY ACTIVITY SLIGHTLY
SWELLING: I HAVE THE SYMPTOM BUT IT DOES NOT AFFECT MY ACTIVITY
STAND: ACTIVITY IS NOT DIFFICULT
SQUAT: ACTIVITY IS SOMEWHAT DIFFICULT

## 2025-01-15 NOTE — PROGRESS NOTES
PHYSICAL THERAPY EVALUATION  Type of Visit: Evaluation             Subjective         Presenting condition or subjective complaint:  Referred to PT SP R TKA DOS 1-. Here with . Using WC and has her walker with her. No immobilizer. Acute pain and feeling very nauseous. Unable to address all questions for health history and KOS. Will complete next session  Date of onset: 01/13/25    Relevant medical history:     Dates & types of surgery:      Prior diagnostic imaging/testing results:       Prior therapy history for the same diagnosis, illness or injury:        Prior Level of Function  Transfers: Independent  Ambulation: Independent  ADL: Independent  IADL: Housekeeping, Laundry, Meal preparation, Yard work, walks dog    Living Environment  Social support:     Type of home:   house  Stairs to enter the home:       2 steps in garage with wall on one side and open on the R side ascending.   Ramp:   no  Stairs inside the home:         Help at home:    Equipment owned:   SEC and 2WW    Employment:      Hobbies/Interests:      Patient goals for therapy:  walking without a lot of pain.     Pain assessment: very acute today, nausea.      Objective   Supine knee AROM: 19-0 to 86 degrees, flexion improved to 15 degrees after heel slides.   GAIT: step to pattern with 2 WW. Shoulders elevated, forward trunk.   FUNCTIONAL STRENGTH: Unable to engage hip flexors and quads. Cannot lift leg onto mat independently nor lower independently to floor.   STEPS: able to complete with walker  - up 2 and down 2 with PT CGA and verbal cues simulating home.   TRANSFERS: indep and slow/careful.       Assessment & Plan   CLINICAL IMPRESSIONS  Medical Diagnosis: Primary osteoarthritis of right knee (M17.11)    Treatment Diagnosis: Primary osteoarthritis of right knee (M17.11)   Impression/Assessment: Patient is a 59 year old female with Weight bearing, knee movement, transfers, gait level and steps complaints.  The following  significant findings have been identified: Pain, Decreased ROM/flexibility, Decreased strength, Impaired balance, Edema, Impaired gait, Decreased activity tolerance, and Impaired posture. These impairments interfere with their ability to perform self care tasks, work tasks, recreational activities, household chores, driving , household mobility, and community mobility as compared to previous level of function.     Clinical Decision Making (Complexity):  Clinical Presentation: Stable/Uncomplicated  Clinical Presentation Rationale: based on medical and personal factors listed in PT evaluation  Clinical Decision Making (Complexity): Low complexity    PLAN OF CARE  Treatment Interventions:  Interventions: Manual Therapy, Neuromuscular Re-education, Therapeutic Activity, Therapeutic Exercise, Self-Care/Home Management    Long Term Goals     PT Goal 1  Goal Identifier: AROM  Goal Description: Bethany will achieve 0 degrees knee extension to 115 degrees knee flexion (short term goal)  Rationale: to maximize safety and independence with performance of ADLs and functional tasks;to maximize safety and independence within the home;to maximize safety and independence within the community;to maximize safety and independence with self cares  Target Date: 01/29/25  PT Goal 2  Goal Identifier: Gait  Goal Description: Brittnee will walk 500 feet without assistance device and normal gait pattern (short term goal)  Rationale: to maximize safety and independence within the home;to maximize safety and independence within the community;to maximize safety and independence with performance of ADLs and functional tasks  Target Date: 02/14/25  PT Goal 3  Goal Identifier: Strength  Goal Description: Brittnee will complete 10 supine SLRs without extensor lag and without brace as well as lift her R leg onto the bed/mat independently allowing her to discontinue immobilizer with WB activities  Target Date: 01/29/25      Frequency of Treatment: 2  times per week  Duration of Treatment: 6 weeks    Recommended Referrals to Other Professionals:  no needs identified at this time  Education Assessment:   Learner/Method: Patient;Significant Other;Listening;Reading;Demonstration;Pictures/Video;No Barriers to Learning  Education Comments: Worked through current home program - see PTRx, plan of care and goals    Risks and benefits of evaluation/treatment have been explained.   Patient/Family/caregiver agrees with Plan of Care.     Evaluation Time:     PT Walter Low Complexity Minutes (97380): 15   Present: Not applicable     Signing Clinician: Salma Oden, PT

## 2025-01-16 LAB
BACTERIA FLD CULT: NORMAL
BACTERIA FLD CULT: NORMAL

## 2025-01-18 LAB — BACTERIA FLD CULT: NO GROWTH

## 2025-01-21 ENCOUNTER — THERAPY VISIT (OUTPATIENT)
Dept: PHYSICAL THERAPY | Facility: CLINIC | Age: 60
End: 2025-01-21
Attending: ORTHOPAEDIC SURGERY
Payer: COMMERCIAL

## 2025-01-21 DIAGNOSIS — M17.11 PRIMARY OSTEOARTHRITIS OF RIGHT KNEE: Primary | ICD-10-CM

## 2025-01-21 PROCEDURE — 97110 THERAPEUTIC EXERCISES: CPT | Mod: GP | Performed by: PHYSICAL THERAPIST

## 2025-01-21 PROCEDURE — 97530 THERAPEUTIC ACTIVITIES: CPT | Mod: GP | Performed by: PHYSICAL THERAPIST

## 2025-01-22 DIAGNOSIS — Z96.659 STATUS POST TOTAL KNEE REPLACEMENT, UNSPECIFIED LATERALITY: Primary | ICD-10-CM

## 2025-01-22 DIAGNOSIS — R11.0 NAUSEA: ICD-10-CM

## 2025-01-22 RX ORDER — ONDANSETRON 4 MG/1
4 TABLET, ORALLY DISINTEGRATING ORAL EVERY 8 HOURS PRN
Qty: 30 TABLET | Refills: 1 | Status: SHIPPED | OUTPATIENT
Start: 2025-01-22

## 2025-01-22 RX ORDER — ONDANSETRON 4 MG/1
4 TABLET, ORALLY DISINTEGRATING ORAL EVERY 8 HOURS PRN
Qty: 30 TABLET | Refills: 1 | Status: CANCELLED | OUTPATIENT
Start: 2025-01-22

## 2025-01-22 RX ORDER — OXYCODONE HYDROCHLORIDE 5 MG/1
5 TABLET ORAL EVERY 6 HOURS PRN
Qty: 20 TABLET | Refills: 0 | Status: SHIPPED | OUTPATIENT
Start: 2025-01-22

## 2025-01-22 NOTE — PROGRESS NOTES
Patient had one episode of a fall. She is unsure if she had lightheadedness or just mistepped.  No dizziness or spinning.   She also is having a lot of pain, and having muscle spasms.  Taking oxycodone, vistaril, tylenol, ibuprofen, without a lot of help.

## 2025-01-22 NOTE — TELEPHONE ENCOUNTER
Medication Refill Request    Zofran  [facility] [place]    Could we send this information to you in [email or web address] or would you prefer to receive a phone call?:   Patient would prefer a phone call   Okay to leave a detailed message?: No at Cell number on file:    Telephone Information:   Mobile [phone]

## 2025-01-22 NOTE — TELEPHONE ENCOUNTER
ondansetron (ZOFRAN-ODT) 4 MG ODT tab  prescribed by primary care provider.    Routing to primary care team.    SARAI AlmeidaN, RN, PHN

## 2025-01-23 ENCOUNTER — TELEPHONE (OUTPATIENT)
Dept: ORTHOPEDICS | Facility: CLINIC | Age: 60
End: 2025-01-23
Payer: COMMERCIAL

## 2025-01-23 ENCOUNTER — TELEPHONE (OUTPATIENT)
Dept: FAMILY MEDICINE | Facility: CLINIC | Age: 60
End: 2025-01-23
Payer: COMMERCIAL

## 2025-01-23 DIAGNOSIS — Z96.651 STATUS POST TOTAL RIGHT KNEE REPLACEMENT: ICD-10-CM

## 2025-01-23 LAB — BACTERIA FLD CULT: NORMAL

## 2025-01-23 RX ORDER — HYDROXYZINE HYDROCHLORIDE 25 MG/1
TABLET, FILM COATED ORAL
Qty: 30 TABLET | Refills: 0 | Status: SHIPPED | OUTPATIENT
Start: 2025-01-23

## 2025-01-23 RX ORDER — IBUPROFEN 600 MG/1
600 TABLET, FILM COATED ORAL EVERY 6 HOURS PRN
Qty: 30 TABLET | Refills: 0 | Status: SHIPPED | OUTPATIENT
Start: 2025-01-23

## 2025-01-23 NOTE — TELEPHONE ENCOUNTER
RN Triage    Patient Contact    Attempt # 1    Was call answered?  No.  Left message on voicemail with information to call back and ask for a triage nurse.    Romelia Mccormick RN on 1/23/2025 at 9:28 AM

## 2025-01-23 NOTE — TELEPHONE ENCOUNTER
"S-(situation): Patient fell prior to PT the other day.     B-(background): Patient S/P TKA on 01/13.     A-(assessment): She says she struggles using a walker, it is clumsy. She doesn't know if she was fully light headed because it all happened so fast. She says she stepped wrong, turned and went down. She states she is on medications that sometimes make her dizzy, but denies frequent dizziness/lightheadedness. She denies fever, increased bruising to surgical site. She removed bandage yesterday and says there was no drainage. She did say the knee looks \"gnarly\". RN advised her to reach out to surgical team to talk through symptoms and determine if patient needs evaluation of knee. She will have PT on 01/24, who can take quick glance at knee if healing properly.     R-(recommendations): Routing to orthopedic team to review/reach out to patient.     Abelardo Vazquez RN on 1/23/2025 at 10:01 AM      "

## 2025-01-23 NOTE — TELEPHONE ENCOUNTER
See separate TE under family practice provider. Encounter sent to Dr. Walden to advise.     SARAI CastanedaN, RN, PHN

## 2025-01-23 NOTE — TELEPHONE ENCOUNTER
----- Message from Salma Oden sent at 1/22/2025  9:45 AM CST -----  Regarding: Dizziness  Keith Lechuga,     DX: SP R TKA    S: Came into PT 1- and reported a fall earlier in the day. Stated dizziness caused the fall. No previous history.     A:  Felt fine through PT without episodes    R: Asked her to call you/make appt to check possible causes of dizziness.       Salma Oden, PT, LAT, FPS  [email or web address]  [phone]

## 2025-01-23 NOTE — TELEPHONE ENCOUNTER
Other: Patient had surgery on 1/13, would like to speak to care team regarding stiffness in R knee       Could we send this information to you in [employer] or would you prefer to receive a phone call?:   Patient would prefer a phone call   Okay to leave a detailed message?: Yes at Cell number on file:    Telephone Information:   Mobile [phone]

## 2025-01-23 NOTE — TELEPHONE ENCOUNTER
Medication Refill Request    Has the patient contacted the pharmacy for the refill? Yes   Name of medication being requested: Ibuprofen 600mg and hydroxizine hcl 25mg  Provider who prescribed the medication: Dr. Walden  [facility]   Date medication is needed: asap        Could we send this information to you in [employer] or would you prefer to receive a phone call?:   Patient would prefer a phone call   Okay to leave a detailed message?: Yes at Cell number on file:    Telephone Information:   Mobile [phone]

## 2025-01-23 NOTE — TELEPHONE ENCOUNTER
"Called and spoke with patient, she states that she fell a couple of days ago. The only symptoms she endorses at this time is a feeling of \"stiffness\". She denies any increase in pain and states that she did not feel as though she injured the area when she fell. She states that swelling has reduced over the last couple of days and she has been able to continue walking and bearing weight. Routing to Dr. Ramos's team to review and advise.     Sharifa Rodriguez RN   [facility]  "

## 2025-01-23 NOTE — TELEPHONE ENCOUNTER
Pending Prescriptions:                       Disp   Refills    ibuprofen (ADVIL/MOTRIN) 600 MG tablet    30 tab*0            Sig: Take 1 tablet (600 mg) by mouth every 6 hours as           needed for mild pain.    hydrOXYzine HCl (ATARAX) 25 MG tablet     30 tab*0            Sig: Take 1 capsule every 6 hours by mouth as needed           to assist with pain control.    Routing refill request to provider for review/approval because:  Failed refill protocol     SARAI CastanedaN, RN, PHN

## 2025-01-24 ENCOUNTER — ANCILLARY PROCEDURE (OUTPATIENT)
Dept: GENERAL RADIOLOGY | Facility: CLINIC | Age: 60
End: 2025-01-24
Attending: ORTHOPAEDIC SURGERY
Payer: COMMERCIAL

## 2025-01-24 DIAGNOSIS — S89.91XA INJURY OF RIGHT KNEE, INITIAL ENCOUNTER: ICD-10-CM

## 2025-01-24 PROCEDURE — 73562 X-RAY EXAM OF KNEE 3: CPT | Mod: TC | Performed by: RADIOLOGY

## 2025-01-27 LAB — BACTERIA FLD CULT: NORMAL

## 2025-01-29 ENCOUNTER — THERAPY VISIT (OUTPATIENT)
Dept: PHYSICAL THERAPY | Facility: CLINIC | Age: 60
End: 2025-01-29
Attending: ORTHOPAEDIC SURGERY
Payer: COMMERCIAL

## 2025-01-29 DIAGNOSIS — M17.11 PRIMARY OSTEOARTHRITIS OF RIGHT KNEE: Primary | ICD-10-CM

## 2025-01-29 PROCEDURE — 97110 THERAPEUTIC EXERCISES: CPT | Mod: GP | Performed by: PHYSICAL THERAPIST

## 2025-02-04 ENCOUNTER — TELEPHONE (OUTPATIENT)
Dept: ORTHOPEDICS | Facility: CLINIC | Age: 60
End: 2025-02-04
Payer: COMMERCIAL

## 2025-02-04 DIAGNOSIS — Z96.651 STATUS POST TOTAL RIGHT KNEE REPLACEMENT: Primary | ICD-10-CM

## 2025-02-04 RX ORDER — OXYCODONE HYDROCHLORIDE 5 MG/1
5 TABLET ORAL EVERY 6 HOURS PRN
Qty: 15 TABLET | Refills: 0 | Status: SHIPPED | OUTPATIENT
Start: 2025-02-04

## 2025-02-04 RX ORDER — ASPIRIN 325 MG
325 TABLET, DELAYED RELEASE (ENTERIC COATED) ORAL 2 TIMES DAILY
Qty: 30 TABLET | Refills: 0 | Status: SHIPPED | OUTPATIENT
Start: 2025-02-04

## 2025-02-04 NOTE — TELEPHONE ENCOUNTER
Medication Question or Refill        What medication are you calling about (include dose and sig)?:   oxyCODONE (ROXICODONE) 5 MG tablet    acetaminophen (TYLENOL) 325 MG tablet   ibuprofen (ADVIL/MOTRIN) 600 MG tablet   aspirin (ASA) 325 MG   hydrOXYzine HCl (ATARAX) 25 MG tablet     Preferred Pharmacy:     [id number] - [address] [address]  Phone: [phone] Fax: [phone]      Controlled Substance Agreement on file:   CSA -- Patient Level:    CSA: None found at the patient level.       Who prescribed the medication? Dr Cuevas    Do you need a refill? Yes    When did you use the medication last? 2/4/25    Patient offered an appointment? No    Do you have any questions or concerns?  No      Could we send this information to you in [facility] or would you prefer to receive a phone call?:   Patient would prefer a phone call   Okay to leave a detailed message?: Yes at Cell number on file:    Telephone Information:   Mobile [phone]

## 2025-02-04 NOTE — TELEPHONE ENCOUNTER
Returned call to pt who is s/p RIGHT TOTAL KNEE ARTHROPLASTY  on 1/13/2025. She requested the 4 meds below, but RN advised usually the ASA is 2 weeks post as it is for DVT prevention. She has enough hydroxyzine after looking for RN. Advised they tylenol and IBU are typically one fill then they can change to OTC as well. Pt questioning the above.  Oxycodone- 6 left- pt requesting r/f  Doing RICE  4/10 pain currently- not up moving around.    STEPHANIE Philip RN 2/4/2025 3:03 PM

## 2025-02-05 ENCOUNTER — TELEPHONE (OUTPATIENT)
Dept: ORTHOPEDICS | Facility: CLINIC | Age: 60
End: 2025-02-05

## 2025-02-05 ENCOUNTER — THERAPY VISIT (OUTPATIENT)
Dept: PHYSICAL THERAPY | Facility: CLINIC | Age: 60
End: 2025-02-05
Attending: ORTHOPAEDIC SURGERY
Payer: COMMERCIAL

## 2025-02-05 DIAGNOSIS — M17.11 PRIMARY OSTEOARTHRITIS OF RIGHT KNEE: Primary | ICD-10-CM

## 2025-02-05 PROCEDURE — 97110 THERAPEUTIC EXERCISES: CPT | Mod: GP | Performed by: PHYSICAL THERAPIST

## 2025-02-05 NOTE — TELEPHONE ENCOUNTER
Other: Patient is needing another prescription for higher dosage tylenol and ibruprofen, called yesterday     Could we send this information to you in Pairint or would you prefer to receive a phone call?:   Patient would prefer a phone call   Okay to leave a detailed message?: Yes at Cell number on file:    Telephone Information:   Mobile [phone]

## 2025-02-05 NOTE — TELEPHONE ENCOUNTER
Called and left vm- OTC tylenol and IBU. Oxy was sent in with 2 more weeks of ASA.    STEPHANIE Philip RN 2/5/2025 4:44 PM

## 2025-02-11 ENCOUNTER — THERAPY VISIT (OUTPATIENT)
Dept: PHYSICAL THERAPY | Facility: CLINIC | Age: 60
End: 2025-02-11
Attending: ORTHOPAEDIC SURGERY
Payer: COMMERCIAL

## 2025-02-11 DIAGNOSIS — F41.1 GAD (GENERALIZED ANXIETY DISORDER): ICD-10-CM

## 2025-02-11 DIAGNOSIS — I10 BENIGN ESSENTIAL HYPERTENSION: ICD-10-CM

## 2025-02-11 DIAGNOSIS — M17.11 PRIMARY OSTEOARTHRITIS OF RIGHT KNEE: Primary | ICD-10-CM

## 2025-02-11 PROCEDURE — 97110 THERAPEUTIC EXERCISES: CPT | Mod: GP | Performed by: PHYSICAL THERAPIST

## 2025-02-11 RX ORDER — VENLAFAXINE HYDROCHLORIDE 150 MG/1
150 CAPSULE, EXTENDED RELEASE ORAL DAILY
Qty: 90 CAPSULE | Refills: 1 | Status: SHIPPED | OUTPATIENT
Start: 2025-02-11

## 2025-02-11 RX ORDER — LOSARTAN POTASSIUM 25 MG/1
25 TABLET ORAL DAILY
Qty: 90 TABLET | Refills: 2 | Status: SHIPPED | OUTPATIENT
Start: 2025-02-11

## 2025-02-12 ENCOUNTER — TELEPHONE (OUTPATIENT)
Dept: ORTHOPEDICS | Facility: CLINIC | Age: 60
End: 2025-02-12
Payer: COMMERCIAL

## 2025-02-12 ENCOUNTER — TELEPHONE (OUTPATIENT)
Dept: FAMILY MEDICINE | Facility: CLINIC | Age: 60
End: 2025-02-12
Payer: COMMERCIAL

## 2025-02-12 NOTE — LETTER
March 5, 2025      Brittnee Herron  [address] [address] [place] [id number]        March 5, 2025    [facility]  [facility]  [address] [address] [place] [address]    Your team at [facility] cares about your health. We have reviewed your chart and based on our findings; we are making the following recommendations to better manage your health.     You are in particular need of attention regarding the following:     PREVENTATIVE VISIT: Physical  Please schedule a Nurse Only Appointment with your primary care clinic to update your immunizations that are due.    If you have already completed these items, please contact the clinic via phone or   Sky[employer] so your care team can review and update your records. Thank you for   choosing [facility] for your healthcare needs. For any questions,   concerns, or to schedule an appointment please contact our clinic.    Healthy Regards,      Your [facility] Care Team             Sincerely,        Alexandrea Mcqueen NP    Electronically signed

## 2025-02-12 NOTE — TELEPHONE ENCOUNTER
Patient Quality Outreach    Patient is due for the following:   Cervical Cancer Screening - PAP Needed  Physical Preventive Adult Physical      Topic Date Due    Pneumococcal Vaccine (1 of 1 - PCV) Never done    Flu Vaccine (1) 09/01/2024    COVID-19 Vaccine (5 - 2024-25 season) 09/01/2024       Action(s) Taken:   Schedule a Adult Preventative    Type of outreach:    Sent FreeATM message.    Questions for provider review:     None          Shivam Smith CNA

## 2025-02-12 NOTE — TELEPHONE ENCOUNTER
Other: Patient calling because she is struggling after knee surgery. She was hardly able to walk yesterday. She is wondering if it may be possible for her to qualify for disability insurance. Her job requires her to walk without a walker. Please call her back.      Could we send this information to you in [employer] or would you prefer to receive a phone call?:   Patient would prefer a phone call   Okay to leave a detailed message?: Yes at Cell number on file:    Telephone Information:   Mobile [phone]

## 2025-02-13 ENCOUNTER — THERAPY VISIT (OUTPATIENT)
Dept: PHYSICAL THERAPY | Facility: CLINIC | Age: 60
End: 2025-02-13
Attending: ORTHOPAEDIC SURGERY
Payer: COMMERCIAL

## 2025-02-13 ENCOUNTER — TELEPHONE (OUTPATIENT)
Dept: FAMILY MEDICINE | Facility: CLINIC | Age: 60
End: 2025-02-13
Payer: COMMERCIAL

## 2025-02-13 DIAGNOSIS — M17.11 PRIMARY OSTEOARTHRITIS OF RIGHT KNEE: Primary | ICD-10-CM

## 2025-02-13 PROCEDURE — 97110 THERAPEUTIC EXERCISES: CPT | Mod: GP | Performed by: PHYSICAL THERAPIST

## 2025-02-13 NOTE — TELEPHONE ENCOUNTER
General Call    Contacts       Contact Date/Time Type Contact Phone/Fax    02/13/2025 09:55 AM CST Phone (Incoming) Brittnee Herron YURIDIA (Self) [phone] (M)          Reason for Call:  Extend STD     What are your questions or concerns:  Does she need an appointment with you to have her forms completed to extend her STD for her knee surgery, currently she is supposed to be back to work in April,     Where do you want to schedule her if she needs to be seen for the STD?    Date of last appointment with provider: 12/18/2023    Could we send this information to you in Fl[employer] or would you prefer to receive a phone call?:   Patient would prefer a phone call   Okay to leave a detailed message?: Yes at Cell number on file:    Telephone Information:   Mobile [phone]

## 2025-02-13 NOTE — TELEPHONE ENCOUNTER
Spoke to patient about a disability claim and discussed that this would depend on her recovery as time goes on. Not enough time has passed to let her body recover and long term disability is not an option at this time. Patient confirmed understanding.     Kari VALDERRAMA RN, [facility]ty [facility] 02/13/25 9:48 AM

## 2025-02-18 ENCOUNTER — THERAPY VISIT (OUTPATIENT)
Dept: PHYSICAL THERAPY | Facility: CLINIC | Age: 60
End: 2025-02-18
Attending: ORTHOPAEDIC SURGERY
Payer: COMMERCIAL

## 2025-02-18 DIAGNOSIS — M17.11 PRIMARY OSTEOARTHRITIS OF RIGHT KNEE: Primary | ICD-10-CM

## 2025-02-18 PROCEDURE — 97110 THERAPEUTIC EXERCISES: CPT | Mod: GP | Performed by: PHYSICAL THERAPIST

## 2025-02-19 ENCOUNTER — TELEPHONE (OUTPATIENT)
Dept: ORTHOPEDICS | Facility: OTHER | Age: 60
End: 2025-02-19
Payer: COMMERCIAL

## 2025-02-19 ENCOUNTER — TELEPHONE (OUTPATIENT)
Dept: PHYSICAL THERAPY | Facility: CLINIC | Age: 60
End: 2025-02-19
Payer: COMMERCIAL

## 2025-02-19 NOTE — TELEPHONE ENCOUNTER
Will await phone call and/or forms.     Sharifa Rodriguez RN   [facility]

## 2025-02-19 NOTE — TELEPHONE ENCOUNTER
Other: pt called to let Armando Sharpe know that a disability  will be calling him a few days.  This is just an FYI.  She will talk to him about this at her appointment.       Could we send this information to you in [facility] or would you prefer to receive a phone call?:   Patient would prefer a phone call   Okay to leave a detailed message?: No at Cell number on file:    Telephone Information:   Mobile [phone]

## 2025-02-20 ENCOUNTER — THERAPY VISIT (OUTPATIENT)
Dept: PHYSICAL THERAPY | Facility: CLINIC | Age: 60
End: 2025-02-20
Attending: ORTHOPAEDIC SURGERY
Payer: COMMERCIAL

## 2025-02-20 ENCOUNTER — TELEPHONE (OUTPATIENT)
Dept: ORTHOPEDICS | Facility: OTHER | Age: 60
End: 2025-02-20

## 2025-02-20 DIAGNOSIS — M17.11 PRIMARY OSTEOARTHRITIS OF RIGHT KNEE: Primary | ICD-10-CM

## 2025-02-20 PROCEDURE — 97530 THERAPEUTIC ACTIVITIES: CPT | Mod: GP | Performed by: PHYSICAL THERAPIST

## 2025-02-20 PROCEDURE — 97110 THERAPEUTIC EXERCISES: CPT | Mod: GP | Performed by: PHYSICAL THERAPIST

## 2025-02-20 NOTE — TELEPHONE ENCOUNTER
Patient is asking for a call from Armando Sharpe, before he fills out her disability ppwk. She has a couple things she would like to discuss before he fills it out.    Please call patient at [phone], any time, okay to leave detailed msg.

## 2025-02-20 NOTE — TELEPHONE ENCOUNTER
"Phone call from patient today states:     \"Patient is asking for a call from Armando Sahrpe, before he fills out her disability ppwk. She has a couple things she would like to discuss before he fills it out.     Please call patient at [phone], any time, okay to leave detailed msg.\"    Jayna Coulter RN   [facility]     " Insect Bite or Sting   WHAT YOU NEED TO KNOW:   Most insect bites and stings are not dangerous and go away without treatment  Your symptoms may be mild, or you may develop anaphylaxis  Anaphylaxis is a sudden, life-threatening reaction that needs immediate treatment  Common examples of insects that bite or sting are bees, ticks, mosquitoes, spiders, and ants  Insect bites or stings can lead to diseases such as malaria, West Nile virus, Lyme disease, or [place] Spotted Fever  DISCHARGE INSTRUCTIONS:   Call your local emergency number (911 in the [address] [facility]) for signs or symptoms of anaphylaxis,  such as trouble breathing, swelling in your mouth or throat, or wheezing  You may also have itching, a rash, hives, or feel like you are going to faint  Return to the emergency department if:   You are stung on your tongue or in your throat  A white area forms around the bite  You are sweating badly or have body pain  You think you were bitten or stung by a poisonous insect  Call your doctor if:   You have a fever  The area becomes red, warm, tender, and swollen beyond the area of the bite or sting  You have questions or concerns about your condition or care  Medicines: You may need any of the following:  Antihistamines  decrease itching and rash  Epinephrine  is used to treat severe allergic reactions such as anaphylaxis  Take your medicine as directed  Contact your healthcare provider if you think your medicine is not helping or if you have side effects  Tell your provider if you are allergic to any medicine  Keep a list of the medicines, vitamins, and herbs you take  Include the amounts, and when and why you take them  Bring the list or the pill bottles to follow-up visits  Carry your medicine list with you in case of an emergency  Steps to take for signs or symptoms of anaphylaxis:   Immediately  give 1 shot of epinephrine only into the outer thigh muscle           Leave the shot in place as directed  Your healthcare provider may recommend you leave it in place for up to 10 seconds before you remove it  This helps make sure all of the epinephrine is delivered  Call 911 and go to the emergency department,  even if the shot improved symptoms  Do not drive yourself  Bring the used epinephrine shot with you  Safety precautions to take if you are at risk for anaphylaxis:   Keep 2 shots of epinephrine with you at all times  You may need a second shot, because epinephrine only works for about 20 minutes and symptoms may return  Your healthcare provider can show you and family members how to give the shot  Check the expiration date every month and replace it before it expires  Create an action plan  Your healthcare provider can help you create a written plan that explains the allergy and an emergency plan to treat a reaction  The plan explains when to give a second epinephrine shot if symptoms return or do not improve after the first  Give copies of the action plan and emergency instructions to family members, work and school staff, and  providers  Show them how to give a shot of epinephrine  Carry medical alert identification  Wear medical alert jewelry or carry a card that says you have an insect allergy  Ask your healthcare provider where to get these items  If an insect bites or stings you:   Remove the stinger  Scrape the stinger out with your fingernail, edge of a credit card, or a knife blade  Do not squeeze the wound  Gently wash the area with soap and water  Remove the tick  Ticks must be removed as soon as possible so you do not get diseases passed through tick bites  Ask your healthcare provider for more information on tick bites and how to remove ticks  Care for a bite or sting wound:   Elevate (raise) the area above the level of your heart, if possible  Prop the area on pillows to keep it raised comfortably   Elevate the area for 10 to 20 minutes each hour or as directed by your healthcare provider  Use compresses  Soak a clean washcloth in cold water, wring it out, and put it on the bite or sting  Use the compress for 10 to 20 minutes each hour or as directed by your healthcare provider  After 24 to 48 hours, change to warm compresses  Apply a paste  Add water to baking soda to make a thick paste  Put the paste on the area for 5 minutes  Rinse gently to remove the paste  Prevent another insect bite or sting:   Do not wear bright-colored or flower-print clothing when you plan to spend time outdoors  Do not use hairspray, perfumes, or aftershave  Do not leave food out  Empty any standing water and wash container with soap and water every 2 days  Put screens on all open windows and doors  Put insect repellent that contains DEET on skin that is showing when you go outside  Put insect repellent at the top of your boots, bottom of pant legs, and sleeve cuffs  Wear long sleeves, pants, and shoes  Use citronella candles outdoors to help keep mosquitoes away  Put a tick and flea collar on pets  Follow up with your doctor as directed:  Write down your questions so you remember to ask them during your visits  © Copyright Austyn Logan 2022 Information is for End User's use only and may not be sold, redistributed or otherwise used for commercial purposes  The above information is an  only  It is not intended as medical advice for individual conditions or treatments  Talk to your doctor, nurse or pharmacist before following any medical regimen to see if it is safe and effective for you

## 2025-02-20 NOTE — TELEPHONE ENCOUNTER
See encounter from 2/19/25. Will close this encounter now.     Jayna Coulter RN   [facility]

## 2025-02-24 ENCOUNTER — TELEPHONE (OUTPATIENT)
Dept: ORTHOPEDICS | Facility: CLINIC | Age: 60
End: 2025-02-24
Payer: COMMERCIAL

## 2025-02-24 NOTE — TELEPHONE ENCOUNTER
Spoke with patient.  She is checking on her disability paperwork that she needs filled out by the end of March when she is set to return to work.    I do see that there is an encounter started (2/19/25) that forms were faxed over to Dr Walden' team in [place].    Patient is scheduled to see Tate Dell for 6 week post op on 3/11.  Let her know that she may need to wait for paperwork until seen to know what/if any restrictions will be.  Forwarding to ortho team as FYI to watch for paperwork.    Kat Harmon MSN, RN   [facility], 2/24/2025 12:08 PM

## 2025-02-24 NOTE — TELEPHONE ENCOUNTER
Other: Brittnee called she would like Dr. Walden nurse to give her a call this week.     Could we send this information to you in [employer] or would you prefer to receive a phone call?:   Patient would prefer a phone call   Okay to leave a detailed message?: Yes at Cell number on file:    Telephone Information:   Mobile [phone]

## 2025-02-25 ENCOUNTER — THERAPY VISIT (OUTPATIENT)
Dept: PHYSICAL THERAPY | Facility: CLINIC | Age: 60
End: 2025-02-25
Attending: ORTHOPAEDIC SURGERY
Payer: COMMERCIAL

## 2025-02-25 DIAGNOSIS — M17.11 PRIMARY OSTEOARTHRITIS OF RIGHT KNEE: Primary | ICD-10-CM

## 2025-02-25 PROCEDURE — 97110 THERAPEUTIC EXERCISES: CPT | Mod: GP | Performed by: PHYSICAL THERAPIST

## 2025-02-25 NOTE — TELEPHONE ENCOUNTER
Contacted patient and informed her that paperwork had been completed and faxed back today.  That company will process and contact her regarding the disability claim further.      SHANNON Olvera/BRANDIE  Certified Athletic Trainer  Clinic Coordinator - Dr. Walden

## 2025-02-27 ENCOUNTER — THERAPY VISIT (OUTPATIENT)
Dept: PHYSICAL THERAPY | Facility: CLINIC | Age: 60
End: 2025-02-27
Attending: ORTHOPAEDIC SURGERY
Payer: COMMERCIAL

## 2025-02-27 DIAGNOSIS — M17.11 PRIMARY OSTEOARTHRITIS OF RIGHT KNEE: Primary | ICD-10-CM

## 2025-02-27 PROCEDURE — 97110 THERAPEUTIC EXERCISES: CPT | Mod: GP | Performed by: PHYSICAL THERAPIST

## 2025-03-04 ENCOUNTER — TELEPHONE (OUTPATIENT)
Dept: ORTHOPEDICS | Facility: CLINIC | Age: 60
End: 2025-03-04
Payer: COMMERCIAL

## 2025-03-04 NOTE — TELEPHONE ENCOUNTER
Called patient and notified her that this would be discussed at follow up on 3/11. Patient confirmed understanding.     Kari VALDERRAMA RN, [facility] 03/04/25 11:26 AM

## 2025-03-04 NOTE — TELEPHONE ENCOUNTER
Medication Refill Request    Has the patient contacted the pharmacy for the refill? Yes   Name of medication being requested: Hydroxyzine HCL 25mg   Provider who prescribed the medication: Dr. Walden   [facility]   Date medication is needed: asap       Could we send this information to you in [employer] or would you prefer to receive a phone call?:   Patient would prefer a phone call   Okay to leave a detailed message?: Yes at Cell number on file:    Telephone Information:   Mobile [phone]

## 2025-03-05 NOTE — TELEPHONE ENCOUNTER
Patient Quality Outreach    Patient is due for the following:   Cervical Cancer Screening - PAP Needed  Physical Preventive Adult Physical      Topic Date Due    Pneumococcal Vaccine (1 of 1 - PCV) Never done    Flu Vaccine (1) 09/01/2024    COVID-19 Vaccine (5 - 2024-25 season) 09/01/2024       Action(s) Taken:   Schedule a Adult Preventative    Type of outreach:    Sent letter.    Questions for provider review:    None           Shivam Smith CNA

## 2025-03-10 ENCOUNTER — TELEPHONE (OUTPATIENT)
Dept: ORTHOPEDICS | Facility: CLINIC | Age: 60
End: 2025-03-10
Payer: COMMERCIAL

## 2025-03-10 NOTE — TELEPHONE ENCOUNTER
Medication Question or Refill    Contacts       Contact Date/Time Type Contact Phone/Fax    03/10/2025 11:16 AM CDT Phone (Incoming) CliveBrittnee tarango (Self) [phone] (M)            What medication are you calling about (include dose and sig)?: TiZANidine (ZANAFLEX) 4 MG tablet    Preferred Pharmacy:  [facility] - [address] [address]  Phone: [phone] Fax: [phone]      Controlled Substance Agreement on file:   CSA -- Patient Level:    CSA: None found at the patient level.       Who prescribed the medication?: Lukas Walden MD    Do you need a refill? Yes    When did you use the medication last? 3/09/25    Patient offered an appointment? No    Do you have any questions or concerns?  No      Could we send this information to you in [facility] or would you prefer to receive a phone call?:   Patient would prefer a phone call   Okay to leave a detailed message?: Yes at Cell number on file:    Telephone Information:   Mobile [phone]

## 2025-03-10 NOTE — TELEPHONE ENCOUNTER
Called patient and notified her that this would be discussed at tomorrows visit. Patient confirmed understanding.     Kari VALDERRAMA RN, [facility] 03/10/25 11:50 AM

## 2025-03-11 ENCOUNTER — ANCILLARY PROCEDURE (OUTPATIENT)
Dept: GENERAL RADIOLOGY | Facility: CLINIC | Age: 60
End: 2025-03-11
Attending: PHYSICIAN ASSISTANT
Payer: COMMERCIAL

## 2025-03-11 ENCOUNTER — OFFICE VISIT (OUTPATIENT)
Dept: ORTHOPEDICS | Facility: CLINIC | Age: 60
End: 2025-03-11
Payer: COMMERCIAL

## 2025-03-11 VITALS — HEIGHT: 64 IN | TEMPERATURE: 98 F | BODY MASS INDEX: 33.12 KG/M2 | WEIGHT: 194 LBS

## 2025-03-11 DIAGNOSIS — Z96.651 STATUS POST TOTAL RIGHT KNEE REPLACEMENT: ICD-10-CM

## 2025-03-11 DIAGNOSIS — M25.661 STIFFNESS OF KNEE JOINT, RIGHT: ICD-10-CM

## 2025-03-11 DIAGNOSIS — Z96.651 STATUS POST TOTAL RIGHT KNEE REPLACEMENT: Primary | ICD-10-CM

## 2025-03-11 PROCEDURE — 73560 X-RAY EXAM OF KNEE 1 OR 2: CPT | Mod: TC | Performed by: STUDENT IN AN ORGANIZED HEALTH CARE EDUCATION/TRAINING PROGRAM

## 2025-03-11 PROCEDURE — 99024 POSTOP FOLLOW-UP VISIT: CPT | Performed by: PHYSICIAN ASSISTANT

## 2025-03-11 PROCEDURE — 1125F AMNT PAIN NOTED PAIN PRSNT: CPT | Performed by: PHYSICIAN ASSISTANT

## 2025-03-11 RX ORDER — HYDROXYZINE HYDROCHLORIDE 25 MG/1
TABLET, FILM COATED ORAL
Qty: 30 TABLET | Refills: 1 | Status: SHIPPED | OUTPATIENT
Start: 2025-03-11

## 2025-03-11 ASSESSMENT — PAIN SCALES - GENERAL: PAINLEVEL_OUTOF10: MODERATE PAIN (5)

## 2025-03-11 NOTE — LETTER
March 11, 2025      Brittnee Herron  [address] [address] [facility] [address]        To Whom It May Concern:    Brittnee Herron  was seen on 3-11-25.  Please excuse her from work until follow up in 1 month, then reevaluate.        Sincerely,        Armando Sharpe PA-C

## 2025-03-11 NOTE — LETTER
"3/11/2025      Brittnee Herron  [id number] [facility] [address]      Dear Colleague,    Thank you for referring your patient, Brittnee Herron, to the [facility]. Please see a copy of my visit note below.    Orthopedic Clinic Post-Operative Note    CHIEF COMPLAINT:   Chief Complaint   Patient presents with     Surgical Followup     Right total knee replacement DOS 1-13-25 Win       HISTORY OF PRESENT ILLNESS  Location: Right knee  Rating of Pain: 5 out of 10  Pain is better with: Rest  Pain improving overall: Yes slowly  Associated Features: Denies numbness or tingling shooting burning electric pain.  Does admit she is stiff.  Patient concerns:.  Concerned about going back to work and when she is going back to work.  Additional History: Patient is still using her walker and cane at home and is doing some biking at home.  She is still dealing with spasms.  She is due to go back to work on 4/7/2025 and states she is not ready    Patient's past medical, surgical, social and family histories reviewed.     REVIEW OF SYSTEMS  Review of systems negative other than positive findings in HPI.    Physical Exam:  Vitals: Temp 98  F (36.7  C) (Temporal)   Ht 1.613 m (5' 3.5\")   Wt 88 kg (194 lb)   LMP 07/10/2018   BMI 33.83 kg/m    BMI= Body mass index is 33.83 kg/m .  Constitutional: healthy, alert and no acute distress   Psychiatric: mentation appears normal and affect normal/bright  NEURO: no focal deficits, CMS intact right lower extremity   RESP: Normal with easy respirations and no use of accessory muscles to breathe, no audible wheezing or retractions  CV: Calf soft and nontender to palpation, leg warm   SKIN: Incision completely healed with no erythema swelling or drainage.  The rest of the knee with no erythema, rashes, excoriation, or breakdown. No evidence of infection.   MUSCULOSKELETAL:  INSPECTION of right knee: No gross deformities, erythema, edema, ecchymosis, atrophy or " fasciculations.   PALPATION: No tenderness medial, lateral, anterior and posterior portion of the knee. No specific joint line tenderness.  No prepatella bursa tenderness or pes bursa tenderness. No increased warmth.  No effusion.   ROM: Passive: Extension 10 degrees short of full, flexion to 95 . All range of motion without catching, locking or pain.     STRENGTH: 5 out of 5 quad and hamstring without pain.   SPECIAL TEST: Stable to varus and valgus stress at 30  of flexion.    GAIT: Right antalgic with 4-prong cane today.  Lymph: no palpable lymph nodes    Diagnostic Modalities:  X-rays done today showing no fracture no dislocation no tumor and good prosthesis placement and no prosthesis failure and no signs of loosening.  X-rays are compared to the previous x-rays that were done on 1/24/2025 and showing no change.  Alignment acceptable also.    Independent visualization of the images was performed.      Impression: 1.  1 month and 27 days status post right total knee arthroplasty by Dr. Walden  2.  Right knee stiffness    FOCUSED PLAN:   Patient doing well but does not have full extension yet.  She is utilizing her walker and cane also as needed.  Order placed for physical therapy to increase frequency of visits from 2 times a week to 3 times a week and focus on full extension especially as well as gait.  Patient educated to alternate ibuprofen and Tylenol during the day.  She does take Atarax and Zanaflex as she is still having some spasms we did refill this today but most likely will not need another refill.  Patient educated on ways to fully extend the knee when not in therapy or doing home exercises.  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  I wrote a work note saying the patient is to stay out of work until the patient is reassessed in 1 month and we can see how she is doing at that point.  Follow-up in 1 month.    X-ray: Patient should get AP lateral of right knee on  follow-up.    This note was dictated with [facility].    Armando Sharpe PA-C              Again, thank you for allowing me to participate in the care of your patient.        Sincerely,        Armando Sharpe PA-C    Electronically signed

## 2025-03-11 NOTE — PROGRESS NOTES
"Orthopedic Clinic Post-Operative Note    CHIEF COMPLAINT:   Chief Complaint   Patient presents with    Surgical Followup     Right total knee replacement DOS 1-13-25 Win       HISTORY OF PRESENT ILLNESS  Location: Right knee  Rating of Pain: 5 out of 10  Pain is better with: Rest  Pain improving overall: Yes slowly  Associated Features: Denies numbness or tingling shooting burning electric pain.  Does admit she is stiff.  Patient concerns:.  Concerned about going back to work and when she is going back to work.  Additional History: Patient is still using her walker and cane at home and is doing some biking at home.  She is still dealing with spasms.  She is due to go back to work on 4/7/2025 and states she is not ready    Patient's past medical, surgical, social and family histories reviewed.     REVIEW OF SYSTEMS  Review of systems negative other than positive findings in HPI.    Physical Exam:  Vitals: Temp 98  F (36.7  C) (Temporal)   Ht 1.613 m (5' 3.5\")   Wt 88 kg (194 lb)   LMP 07/10/2018   BMI 33.83 kg/m    BMI= Body mass index is 33.83 kg/m .  Constitutional: healthy, alert and no acute distress   Psychiatric: mentation appears normal and affect normal/bright  NEURO: no focal deficits, CMS intact right lower extremity   RESP: Normal with easy respirations and no use of accessory muscles to breathe, no audible wheezing or retractions  CV: Calf soft and nontender to palpation, leg warm   SKIN: Incision completely healed with no erythema swelling or drainage.  The rest of the knee with no erythema, rashes, excoriation, or breakdown. No evidence of infection.   MUSCULOSKELETAL:  INSPECTION of right knee: No gross deformities, erythema, edema, ecchymosis, atrophy or fasciculations.   PALPATION: No tenderness medial, lateral, anterior and posterior portion of the knee. No specific joint line tenderness.  No prepatella bursa tenderness or pes bursa tenderness. No increased warmth.  No effusion.   ROM: " Passive: Extension 10 degrees short of full, flexion to 95 . All range of motion without catching, locking or pain.     STRENGTH: 5 out of 5 quad and hamstring without pain.   SPECIAL TEST: Stable to varus and valgus stress at 30  of flexion.    GAIT: Right antalgic with 4-prong cane today.  Lymph: no palpable lymph nodes    Diagnostic Modalities:  X-rays done today showing no fracture no dislocation no tumor and good prosthesis placement and no prosthesis failure and no signs of loosening.  X-rays are compared to the previous x-rays that were done on 1/24/2025 and showing no change.  Alignment acceptable also.    Independent visualization of the images was performed.      Impression: 1.  1 month and 27 days status post right total knee arthroplasty by Dr. Walden  2.  Right knee stiffness    FOCUSED PLAN:   Patient doing well but does not have full extension yet.  She is utilizing her walker and cane also as needed.  Order placed for physical therapy to increase frequency of visits from 2 times a week to 3 times a week and focus on full extension especially as well as gait.  Patient educated to alternate ibuprofen and Tylenol during the day.  She does take Atarax and Zanaflex as she is still having some spasms we did refill this today but most likely will not need another refill.  Patient educated on ways to fully extend the knee when not in therapy or doing home exercises.  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  I wrote a work note saying the patient is to stay out of work until the patient is reassessed in 1 month and we can see how she is doing at that point.  Follow-up in 1 month.    X-ray: Patient should get AP lateral of right knee on follow-up.    This note was dictated with [employer].    Armando Sharpe PA-C

## 2025-03-12 ENCOUNTER — THERAPY VISIT (OUTPATIENT)
Dept: PHYSICAL THERAPY | Facility: CLINIC | Age: 60
End: 2025-03-12
Attending: ORTHOPAEDIC SURGERY
Payer: COMMERCIAL

## 2025-03-12 DIAGNOSIS — M17.11 PRIMARY OSTEOARTHRITIS OF RIGHT KNEE: Primary | ICD-10-CM

## 2025-03-12 PROCEDURE — 97110 THERAPEUTIC EXERCISES: CPT | Mod: GP | Performed by: PHYSICAL THERAPIST

## 2025-03-14 PROBLEM — Z96.651 STATUS POST TOTAL RIGHT KNEE REPLACEMENT: Status: ACTIVE | Noted: 2025-03-14

## 2025-03-18 ENCOUNTER — THERAPY VISIT (OUTPATIENT)
Dept: PHYSICAL THERAPY | Facility: CLINIC | Age: 60
End: 2025-03-18
Attending: ORTHOPAEDIC SURGERY
Payer: COMMERCIAL

## 2025-03-18 DIAGNOSIS — Z96.651 STATUS POST TOTAL RIGHT KNEE REPLACEMENT: Primary | ICD-10-CM

## 2025-03-18 PROCEDURE — 97140 MANUAL THERAPY 1/> REGIONS: CPT | Mod: GP | Performed by: PHYSICAL THERAPIST

## 2025-03-18 PROCEDURE — 97110 THERAPEUTIC EXERCISES: CPT | Mod: GP | Performed by: PHYSICAL THERAPIST

## 2025-03-20 ENCOUNTER — THERAPY VISIT (OUTPATIENT)
Dept: PHYSICAL THERAPY | Facility: CLINIC | Age: 60
End: 2025-03-20
Attending: ORTHOPAEDIC SURGERY
Payer: COMMERCIAL

## 2025-03-20 DIAGNOSIS — Z96.651 STATUS POST TOTAL RIGHT KNEE REPLACEMENT: Primary | ICD-10-CM

## 2025-03-20 PROCEDURE — 97110 THERAPEUTIC EXERCISES: CPT | Mod: GP | Performed by: PHYSICAL THERAPIST

## 2025-03-20 PROCEDURE — 97140 MANUAL THERAPY 1/> REGIONS: CPT | Mod: GP | Performed by: PHYSICAL THERAPIST

## 2025-03-22 ENCOUNTER — HEALTH MAINTENANCE LETTER (OUTPATIENT)
Age: 60
End: 2025-03-22

## 2025-03-27 ENCOUNTER — THERAPY VISIT (OUTPATIENT)
Dept: PHYSICAL THERAPY | Facility: CLINIC | Age: 60
End: 2025-03-27
Attending: ORTHOPAEDIC SURGERY
Payer: COMMERCIAL

## 2025-03-27 DIAGNOSIS — Z96.651 STATUS POST TOTAL RIGHT KNEE REPLACEMENT: Primary | ICD-10-CM

## 2025-03-27 PROCEDURE — 97110 THERAPEUTIC EXERCISES: CPT | Mod: GP | Performed by: PHYSICAL THERAPIST

## 2025-03-31 ENCOUNTER — THERAPY VISIT (OUTPATIENT)
Dept: PHYSICAL THERAPY | Facility: CLINIC | Age: 60
End: 2025-03-31
Attending: ORTHOPAEDIC SURGERY
Payer: COMMERCIAL

## 2025-03-31 DIAGNOSIS — Z96.651 STATUS POST TOTAL RIGHT KNEE REPLACEMENT: Primary | ICD-10-CM

## 2025-03-31 PROCEDURE — 97140 MANUAL THERAPY 1/> REGIONS: CPT | Mod: GP | Performed by: PHYSICAL THERAPIST

## 2025-03-31 PROCEDURE — 97110 THERAPEUTIC EXERCISES: CPT | Mod: GP | Performed by: PHYSICAL THERAPIST

## 2025-04-07 ENCOUNTER — THERAPY VISIT (OUTPATIENT)
Dept: PHYSICAL THERAPY | Facility: CLINIC | Age: 60
End: 2025-04-07
Attending: ORTHOPAEDIC SURGERY
Payer: COMMERCIAL

## 2025-04-07 DIAGNOSIS — Z96.651 STATUS POST TOTAL RIGHT KNEE REPLACEMENT: Primary | ICD-10-CM

## 2025-04-07 PROCEDURE — 97110 THERAPEUTIC EXERCISES: CPT | Mod: GP | Performed by: PHYSICAL THERAPIST

## 2025-04-07 PROCEDURE — 97140 MANUAL THERAPY 1/> REGIONS: CPT | Mod: GP | Performed by: PHYSICAL THERAPIST

## 2025-04-07 ASSESSMENT — ACTIVITIES OF DAILY LIVING (ADL)
KNEE_ACTIVITY_OF_DAILY_LIVING_SCORE: 75.71
LIMPING: THE SYMPTOM AFFECTS MY ACTIVITY SLIGHTLY
GO DOWN STAIRS: ACTIVITY IS MINIMALLY DIFFICULT
RAW_SCORE: 53
HOW_WOULD_YOU_RATE_THE_CURRENT_FUNCTION_OF_YOUR_KNEE_DURING_YOUR_USUAL_DAILY_ACTIVITIES_ON_A_SCALE_FROM_0_TO_100_WITH_100_BEING_YOUR_LEVEL_OF_KNEE_FUNCTION_PRIOR_TO_YOUR_INJURY_AND_0_BEING_THE_INABILITY_TO_PERFORM_ANY_OF_YOUR_USUAL_DAILY_ACTIVITIES?: 5
SWELLING: THE SYMPTOM AFFECTS MY ACTIVITY SLIGHTLY
STIFFNESS: THE SYMPTOM AFFECTS MY ACTIVITY SLIGHTLY
GO UP STAIRS: ACTIVITY IS MINIMALLY DIFFICULT
SIT WITH YOUR KNEE BENT: ACTIVITY IS MINIMALLY DIFFICULT
STAND: ACTIVITY IS MINIMALLY DIFFICULT
WEAKNESS: I DO NOT HAVE THE SYMPTOM
WALK: ACTIVITY IS MINIMALLY DIFFICULT
HOW_WOULD_YOU_RATE_THE_OVERALL_FUNCTION_OF_YOUR_KNEE_DURING_YOUR_USUAL_DAILY_ACTIVITIES?: NEARLY NORMAL
PAIN: THE SYMPTOM AFFECTS MY ACTIVITY SLIGHTLY
KNEE_ACTIVITY_OF_DAILY_LIVING_SUM: 53
AS_A_RESULT_OF_YOUR_KNEE_INJURY,_HOW_WOULD_YOU_RATE_YOUR_CURRENT_LEVEL_OF_DAILY_ACTIVITY?: NEARLY NORMAL
GIVING WAY, BUCKLING OR SHIFTING OF KNEE: I DO NOT HAVE THE SYMPTOM
KNEEL ON THE FRONT OF YOUR KNEE: ACTIVITY IS SOMEWHAT DIFFICULT
RISE FROM A CHAIR: ACTIVITY IS MINIMALLY DIFFICULT
SQUAT: ACTIVITY IS MINIMALLY DIFFICULT

## 2025-04-08 ENCOUNTER — ANCILLARY PROCEDURE (OUTPATIENT)
Dept: GENERAL RADIOLOGY | Facility: CLINIC | Age: 60
End: 2025-04-08
Attending: PHYSICIAN ASSISTANT
Payer: COMMERCIAL

## 2025-04-08 ENCOUNTER — OFFICE VISIT (OUTPATIENT)
Dept: ORTHOPEDICS | Facility: CLINIC | Age: 60
End: 2025-04-08
Payer: COMMERCIAL

## 2025-04-08 VITALS — TEMPERATURE: 97.7 F | WEIGHT: 200 LBS | HEIGHT: 64 IN | BODY MASS INDEX: 34.15 KG/M2

## 2025-04-08 DIAGNOSIS — Z96.651 STATUS POST TOTAL RIGHT KNEE REPLACEMENT: Primary | ICD-10-CM

## 2025-04-08 DIAGNOSIS — Z96.651 STATUS POST TOTAL RIGHT KNEE REPLACEMENT: ICD-10-CM

## 2025-04-08 DIAGNOSIS — M25.661 STIFFNESS OF KNEE JOINT, RIGHT: ICD-10-CM

## 2025-04-08 PROCEDURE — 1125F AMNT PAIN NOTED PAIN PRSNT: CPT | Performed by: PHYSICIAN ASSISTANT

## 2025-04-08 PROCEDURE — 99024 POSTOP FOLLOW-UP VISIT: CPT | Performed by: PHYSICIAN ASSISTANT

## 2025-04-08 PROCEDURE — 73560 X-RAY EXAM OF KNEE 1 OR 2: CPT | Mod: TC | Performed by: RADIOLOGY

## 2025-04-08 RX ORDER — HYDROXYZINE HYDROCHLORIDE 25 MG/1
TABLET, FILM COATED ORAL
Qty: 28 TABLET | Refills: 0 | Status: SHIPPED | OUTPATIENT
Start: 2025-04-08

## 2025-04-08 ASSESSMENT — PAIN SCALES - GENERAL: PAINLEVEL_OUTOF10: MODERATE PAIN (5)

## 2025-04-08 NOTE — LETTER
4/8/2025      Brittnee Herron  [id number] [facility] [address]      Dear Colleague,    Thank you for referring your patient, Brittnee Herron, to the [facility]. Please see a copy of my visit note below.    Orthopedic Clinic Post-Operative Note    CHIEF COMPLAINT:   Chief Complaint   Patient presents with     Surgical Followup       Right total knee replacement DOS 1-13-25 Win            HISTORY OF PRESENT ILLNESS  Location: Right knee  Rating of Pain: 5 out of 10  Pain is better with: Rest  Pain improving overall: Yes but slowly  Associated Features: Denies numbness or tingling shooting burning or electric pain.  Admits to stiffness.  Patient concerns: Patient feels that she should be on disability now because of her right knee stiffness and left knee arthritis.  Also worried that she will not be able to return to work at the job she is doing ever.  Additional History: Patient was last seen on 3/11/2025 by myself and at that time there was some concern about her range of motion as she was not to full extension and about 10 degrees short of that and only flexing to 95 degrees.  Order placed for physical therapy to increase from 2 times a week to 3 times a week and focus on full extension as well as gait.  She was alternate Tylenol and ibuprofen and we refilled Atarax and Zanaflex because she is still having some spasm but probably would not need it going forward.  Patient did not feel ready for work thus I wrote a note saying it is okay for her to be out of work for 1 more month.  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  I notified Dr. Walden of her range of motion.  Patient feels she had made good progress with therapy and she is saying she is working on it at home on her own also.  She has been off work.    Patient's past medical, surgical, social and family histories reviewed.     REVIEW OF SYSTEMS  Review of systems negative other than positive findings in HPI.    Physical  "Exam:  Vitals: Temp 97.7  F (36.5  C) (Temporal)   Ht 1.632 m (5' 4.25\")   Wt 90.7 kg (200 lb)   LMP 07/10/2018   BMI 34.06 kg/m    BMI= Body mass index is 34.06 kg/m .  Constitutional: healthy, alert and no acute distress   Psychiatric: mentation appears normal and affect normal/bright  NEURO: no focal deficits, CMS intact Right upper extremity   RESP: Normal with easy respirations and no use of accessory muscles to breathe, no audible wheezing or retractions  CV: Calf soft and nontender to palpation, leg warm   SKIN: Midline incision completely healed.  No erythema, rashes, excoriation, or breakdown. No evidence of infection.   MUSCULOSKELETAL:  INSPECTION of right knee: No gross deformities, erythema, edema, ecchymosis, atrophy or fasciculations.   PALPATION: No tenderness medial, lateral, anterior and posterior portion of the knee. No specific joint line tenderness.  No prepatella bursa tenderness or pes bursa tenderness. No increased warmth.  No effusion.   ROM: Passive: Extension 5 degrees short of full, flexion to 90 degrees. All range of motion without catching, locking but there is pain at maximal flexion and asked some more extension.  Contralateral knee goes to full extension and flexes to about 130 degrees   STRENGTH: 5 out of 5 quad and hamstring without pain.   SPECIAL TEST: Stable to varus and valgus stress at 30  of flexion.    GAIT: Right antalgic  Lymph: no palpable lymph nodes    Diagnostic Modalities:  Recent Results (from the past 744 hours)   XR Knee Right 1/2 Views    Narrative    EXAM: XR KNEE RIGHT 1/2 VIEWS  LOCATION: [facility]  DATE: 3/11/2025    INDICATION:  Status post total right knee replacement  COMPARISON: None.      Impression    IMPRESSION: Right total knee arthroplasty with normal alignment. No evidence of loosening or fracture. No sizable effusion.     I agree with the above reading    X-rays done today showing good prosthesis placement no " prosthesis loosening no prosthesis failure..  Alignment good and acceptable no change from previous x-rays done 3/11/2025.  No fracture no dislocation no tumor    Independent visualization of the images was performed.      Impression: 1.  2 month and 27 days status post right total knee arthroplasty by Dr. Walden  2.  Right knee stiffness    FOCUSED PLAN:   Patient presents today and has made some gains with range of motion but does not feel she can return to her work.  She is wondering about if she can be on disability since she has arthritis in the other knee also.  We discussed how we want her to get back to where she was with her total knee and she is only 3 months out so she should the continuing to make gains and be able to get back to her previous activities and more hopefully.  I did write work restrictions of no push pull or lift greater than 15 pounds for the next 6 weeks however she does not feel her work will honor that but she will go talk to them about it.  We ordered formal physical therapy to continue working 3 times a week focusing on full extension gait training strengthening and endurance.  She is reminded to work on massaging incision and also doing her stretches at home and using hot shower or heat before stretching.  I did refill her Zanaflex and Atarax 1 last time as she still gets muscle spasms but it is getting better.  My hope would be to get her back to work without restrictions in 6 weeks.  She feels she might want to resign from her job and I encouraged her to look for a different job if she feels she cannot do this 1 but I do feel she will make gains yet as she is only 3 months out from her total knee.  I feel if she continues to have trouble after the next 6 weeks maybe I would have her follow-up with Dr. Walden to discuss the next steps.  Follow-up with myself in 6 weeks for reassessment.      Re-x-ray on return: No      This note was dictated with [employer].    Armando Sharpe,  MERVIN              Again, thank you for allowing me to participate in the care of your patient.        Sincerely,        Armando Sharpe PA-C    Electronically signed

## 2025-04-08 NOTE — LETTER
April 8, 2025      Brittnee Herron  [id number] [facility] [address]        To Whom It May Concern:    Brittnee Herron was seen in our clinic. She may return to work with the following restrictions:    1.No push pull or lift greater than 15 pounds x 6 weeks.  We will reassess in 6 week  Sincerely,          Armando Sharpe

## 2025-04-08 NOTE — PROGRESS NOTES
"Orthopedic Clinic Post-Operative Note    CHIEF COMPLAINT:   Chief Complaint   Patient presents with    Surgical Followup       Right total knee replacement DOS 1-13-25 Win            HISTORY OF PRESENT ILLNESS  Location: Right knee  Rating of Pain: 5 out of 10  Pain is better with: Rest  Pain improving overall: Yes but slowly  Associated Features: Denies numbness or tingling shooting burning or electric pain.  Admits to stiffness.  Patient concerns: Patient feels that she should be on disability now because of her right knee stiffness and left knee arthritis.  Also worried that she will not be able to return to work at the job she is doing ever.  Additional History: Patient was last seen on 3/11/2025 by myself and at that time there was some concern about her range of motion as she was not to full extension and about 10 degrees short of that and only flexing to 95 degrees.  Order placed for physical therapy to increase from 2 times a week to 3 times a week and focus on full extension as well as gait.  She was alternate Tylenol and ibuprofen and we refilled Atarax and Zanaflex because she is still having some spasm but probably would not need it going forward.  Patient did not feel ready for work thus I wrote a note saying it is okay for her to be out of work for 1 more month.  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  I notified Dr. Walden of her range of motion.  Patient feels she had made good progress with therapy and she is saying she is working on it at home on her own also.  She has been off work.    Patient's past medical, surgical, social and family histories reviewed.     REVIEW OF SYSTEMS  Review of systems negative other than positive findings in HPI.    Physical Exam:  Vitals: Temp 97.7  F (36.5  C) (Temporal)   Ht 1.632 m (5' 4.25\")   Wt 90.7 kg (200 lb)   LMP 07/10/2018   BMI 34.06 kg/m    BMI= Body mass index is 34.06 kg/m .  Constitutional: healthy, alert and no acute distress   Psychiatric: " mentation appears normal and affect normal/bright  NEURO: no focal deficits, CMS intact Right upper extremity   RESP: Normal with easy respirations and no use of accessory muscles to breathe, no audible wheezing or retractions  CV: Calf soft and nontender to palpation, leg warm   SKIN: Midline incision completely healed.  No erythema, rashes, excoriation, or breakdown. No evidence of infection.   MUSCULOSKELETAL:  INSPECTION of right knee: No gross deformities, erythema, edema, ecchymosis, atrophy or fasciculations.   PALPATION: No tenderness medial, lateral, anterior and posterior portion of the knee. No specific joint line tenderness.  No prepatella bursa tenderness or pes bursa tenderness. No increased warmth.  No effusion.   ROM: Passive: Extension 5 degrees short of full, flexion to 90 degrees. All range of motion without catching, locking but there is pain at maximal flexion and asked some more extension.  Contralateral knee goes to full extension and flexes to about 130 degrees   STRENGTH: 5 out of 5 quad and hamstring without pain.   SPECIAL TEST: Stable to varus and valgus stress at 30  of flexion.    GAIT: Right antalgic  Lymph: no palpable lymph nodes    Diagnostic Modalities:  Recent Results (from the past 744 hours)   XR Knee Right 1/2 Views    Narrative    EXAM: XR KNEE RIGHT 1/2 VIEWS  LOCATION: [facility]  DATE: 3/11/2025    INDICATION:  Status post total right knee replacement  COMPARISON: None.      Impression    IMPRESSION: Right total knee arthroplasty with normal alignment. No evidence of loosening or fracture. No sizable effusion.     I agree with the above reading    X-rays done today showing good prosthesis placement no prosthesis loosening no prosthesis failure..  Alignment good and acceptable no change from previous x-rays done 3/11/2025.  No fracture no dislocation no tumor    Independent visualization of the images was performed.      Impression: 1.  2  month and 27 days status post right total knee arthroplasty by Dr. Walden  2.  Right knee stiffness    FOCUSED PLAN:   Patient presents today and has made some gains with range of motion but does not feel she can return to her work.  She is wondering about if she can be on disability since she has arthritis in the other knee also.  We discussed how we want her to get back to where she was with her total knee and she is only 3 months out so she should the continuing to make gains and be able to get back to her previous activities and more hopefully.  I did write work restrictions of no push pull or lift greater than 15 pounds for the next 6 weeks however she does not feel her work will honor that but she will go talk to them about it.  We ordered formal physical therapy to continue working 3 times a week focusing on full extension gait training strengthening and endurance.  She is reminded to work on massaging incision and also doing her stretches at home and using hot shower or heat before stretching.  I did refill her Zanaflex and Atarax 1 last time as she still gets muscle spasms but it is getting better.  My hope would be to get her back to work without restrictions in 6 weeks.  She feels she might want to resign from her job and I encouraged her to look for a different job if she feels she cannot do this 1 but I do feel she will make gains yet as she is only 3 months out from her total knee.  I feel if she continues to have trouble after the next 6 weeks maybe I would have her follow-up with Dr. Walden to discuss the next steps.  Follow-up with myself in 6 weeks for reassessment.      Re-x-ray on return: No      This note was dictated with [email or web address].    Armando Sharpe PA-C

## 2025-04-11 ENCOUNTER — TELEPHONE (OUTPATIENT)
Dept: ORTHOPEDICS | Facility: CLINIC | Age: 60
End: 2025-04-11
Payer: COMMERCIAL

## 2025-04-11 NOTE — TELEPHONE ENCOUNTER
Other: Patient called and state that her employer denied her work restriction. Patient is wondering if the provider wants to see her sooner due to the denial. Please call patient.     Could we send this information to you in "[employer]" or would you prefer to receive a phone call?:   Patient would prefer a phone call   Okay to leave a detailed message?: Yes at Cell number on file:    Telephone Information:   Mobile [phone]

## 2025-04-14 NOTE — TELEPHONE ENCOUNTER
I called the patient and discussed her work restrictions and follow-up etc.  She was curious if she needed to be seen prior to starting therapy  and also wondering if she should be seen sooner.  Basically work said they cannot honor her restrictions so she is off work.  I want her to take this time to really focus on her physical therapy and regaining her range of motion strength and confidence and hopefully in 6 weeks we can get her back to work without restrictions.  She does not need to see me sooner I want to give her the time to work in therapy so she is ready in 6 weeks.  She also has talked about possibly switching jobs but will have that discussion with her when she comes back in 6 weeks.  I called her and discussed this with her.  She understands.

## 2025-04-17 ENCOUNTER — TELEPHONE (OUTPATIENT)
Dept: ORTHOPEDICS | Facility: CLINIC | Age: 60
End: 2025-04-17
Payer: COMMERCIAL

## 2025-04-17 NOTE — TELEPHONE ENCOUNTER
Called and spoke with patient, she states she was able to get some physical therapy completed and then scheduling became too difficult so she was unable to complete the full 6 weeks. She states that she was seeing some improvement while participating. She states that work has become difficult and she would like a letter stating that she cannot work at this time. She states that she is considering applying for unemployment as well. Please review and advise if letter would be appropriate at this time.     Sharifa Rodriguez RN   [facility]

## 2025-04-17 NOTE — TELEPHONE ENCOUNTER
Other: Brittnee called she was not able to get 6 weeks of PT in and she does not want to go back to her previous job to do other things. Please give her a call because she needs Armando Sharpe to sign a letter saying she can't work at this time due to medical reasons.    Could we send this information to you in [employer] or would you prefer to receive a phone call?:   Patient would prefer a phone call   Okay to leave a detailed message?: Yes at Cell number on file:    Telephone Information:   Mobile [phone]

## 2025-04-21 ENCOUNTER — TELEPHONE (OUTPATIENT)
Dept: FAMILY MEDICINE | Facility: CLINIC | Age: 60
End: 2025-04-21
Payer: COMMERCIAL

## 2025-04-21 NOTE — TELEPHONE ENCOUNTER
FYI - Status Update    Who is Calling: patient    Update: Wants to talk about getting on disability, needs dr note?     Does caller want a call/response back: Yes     Could we send this information to you in [employer] or would you prefer to receive a phone call?:   Patient would prefer a phone call   Okay to leave a detailed message?: Yes at Cell number on file:    Telephone Information:   Mobile [phone]

## 2025-04-21 NOTE — TELEPHONE ENCOUNTER
"  General Call    Contacts       Contact Date/Time Type Contact Phone/Fax    04/17/2025 11:05 AM CDT Phone (Incoming) Brittnee Herron (Self) [phone] (M)    04/17/2025 12:49 PM CDT Phone (Outgoing) Brittnee Herron (Self) [phone] (M)    04/21/2025 11:03 AM CDT Phone (Incoming) Brittnee Herron (Self) [phone] (M)          Reason for Call:  Letter     What are your questions or concerns:  Patient wants to change the restrictions in the letter to \" No restrictions\" to return to work    Date of last appointment with provider: 4/8/25    Could we send this information to you in [facility] or would you prefer to receive a phone call?:   Patient would prefer a phone call   Okay to leave a detailed message?: Yes at Home number on file [phone] (home)  "

## 2025-04-21 NOTE — TELEPHONE ENCOUNTER
I was able to discuss this patient with Shira, OUMOU.  I cannot write that she needs to be off work completely because she can work.  I am not understanding why she cannot schedule therapy at this time.  As I have discussed with her in clinic I feel at this point if she does not feel she can work or schedule therapy that she should follow-up with Dr. Walden to discuss this in more detail.  I discussed this with our RN today.

## 2025-04-21 NOTE — TELEPHONE ENCOUNTER
I have not seen patient since 12/18/23.  Can have appointment with any provider.  Alexandrea Mcqueen, CNP

## 2025-04-21 NOTE — TELEPHONE ENCOUNTER
Called and spoke with patient, she states that she would like to try returning to work without restrictions to see if she can tolerate it. She states that she would not return until the second week in May.     Discussed with CINDI Rodriguez who advised that if patient would like to try returning to work now, letter can be provided. However, if she would like to be off until the second week in May, she will need an appointment with Dr. Walden to discuss being off of work until that time.     Called and spoke with patient and relayed the information above. She states that she would like to remain out of work until the second week in May. She was advised that an appointment with Dr. Walden would be needed in order to discuss additional time off of work. She states that she does not have anyone to give her a ride to any of the clinics Dr. Walden visits. She requested to be transferred to primary care to discuss with her provider's care team and states that she will call back to schedule an appointment with Dr. Walden if needed.     Sharifa Rodriguez RN   [facility]

## 2025-04-23 ENCOUNTER — TELEPHONE (OUTPATIENT)
Dept: ORTHOPEDICS | Facility: CLINIC | Age: 60
End: 2025-04-23
Payer: COMMERCIAL

## 2025-04-23 NOTE — TELEPHONE ENCOUNTER
Patient advised we will mail a RTW note without restrictions to start 4/28/25. She is approx 14 week post R total knee arthroplasty and continues with weekly PT through May 29. No other patient questions or concerns.       Arturo HO

## 2025-04-23 NOTE — TELEPHONE ENCOUNTER
Other: Brittnee called she wants to have Dr. Walden to lift her work restrictions. Please call to discuss     Could we send this information to you in [employer] or would you prefer to receive a phone call?:   Patient would prefer a phone call   Okay to leave a detailed message?: Yes at Cell number on file:    Telephone Information:   Mobile [phone]

## 2025-04-29 ENCOUNTER — TELEPHONE (OUTPATIENT)
Dept: ORTHOPEDICS | Facility: CLINIC | Age: 60
End: 2025-04-29
Payer: COMMERCIAL

## 2025-04-29 NOTE — TELEPHONE ENCOUNTER
I called patient and she requested we fax this to her work. This has been faxed.     Kari VALDERRAMA RN, [facility] 04/29/25 10:51 AM

## 2025-04-29 NOTE — TELEPHONE ENCOUNTER
Patient hasn't called back and wont answer phone call. Will close encounter until she calls back  Cyndy Treviño MA 4/29/2025

## 2025-04-29 NOTE — TELEPHONE ENCOUNTER
Form or Letter   Type or form/letter needing completion: Return to work without restrictions letter, requested via mail and she has not received yet  Provider: Dr. Walden   Date form needed: asap   Once completed: Mail form to Name: Brittnee Herron, at Address: [address] [place] [address]    Could we send this information to you in [employer] or would you prefer to receive a phone call?:   Patient would prefer a phone call   Okay to leave a detailed message?: Yes at Cell number on file:    Telephone Information:   Mobile [phone]

## 2025-05-06 PROBLEM — Z96.651 STATUS POST TOTAL RIGHT KNEE REPLACEMENT: Status: RESOLVED | Noted: 2025-03-14 | Resolved: 2025-05-06

## 2025-05-12 ENCOUNTER — TELEPHONE (OUTPATIENT)
Dept: FAMILY MEDICINE | Facility: CLINIC | Age: 60
End: 2025-05-12
Payer: COMMERCIAL

## 2025-05-12 DIAGNOSIS — I10 BENIGN ESSENTIAL HYPERTENSION: ICD-10-CM

## 2025-05-12 RX ORDER — CHLORTHALIDONE 25 MG/1
25 TABLET ORAL DAILY
Qty: 30 TABLET | Refills: 0 | Status: SHIPPED | OUTPATIENT
Start: 2025-05-12 | End: 2025-08-10

## 2025-05-12 NOTE — TELEPHONE ENCOUNTER
Medication passed protocol, however, refill RN could not approve because needing provider review. Should therapy be completed at this time or continued? Rx has end date. Provider, please approve or deny the prescription.

## 2025-06-02 ENCOUNTER — OFFICE VISIT (OUTPATIENT)
Dept: ORTHOPEDICS | Facility: CLINIC | Age: 60
End: 2025-06-02
Payer: COMMERCIAL

## 2025-06-02 ENCOUNTER — ANCILLARY PROCEDURE (OUTPATIENT)
Dept: GENERAL RADIOLOGY | Facility: CLINIC | Age: 60
End: 2025-06-02
Attending: PHYSICIAN ASSISTANT

## 2025-06-02 VITALS — TEMPERATURE: 96.5 F | WEIGHT: 202 LBS | HEIGHT: 64 IN | BODY MASS INDEX: 34.49 KG/M2

## 2025-06-02 DIAGNOSIS — M17.12 PRIMARY OSTEOARTHRITIS OF LEFT KNEE: Primary | ICD-10-CM

## 2025-06-02 DIAGNOSIS — M25.562 LEFT KNEE PAIN: ICD-10-CM

## 2025-06-02 DIAGNOSIS — M25.562 LEFT KNEE PAIN: Primary | ICD-10-CM

## 2025-06-02 PROCEDURE — 20610 DRAIN/INJ JOINT/BURSA W/O US: CPT | Mod: LT | Performed by: PHYSICIAN ASSISTANT

## 2025-06-02 PROCEDURE — 73564 X-RAY EXAM KNEE 4 OR MORE: CPT | Mod: TC | Performed by: RADIOLOGY

## 2025-06-02 PROCEDURE — 99214 OFFICE O/P EST MOD 30 MIN: CPT | Mod: 25 | Performed by: PHYSICIAN ASSISTANT

## 2025-06-02 RX ORDER — TRIAMCINOLONE ACETONIDE 40 MG/ML
40 INJECTION, SUSPENSION INTRA-ARTICULAR; INTRAMUSCULAR
Status: COMPLETED | OUTPATIENT
Start: 2025-06-02 | End: 2025-06-02

## 2025-06-02 RX ORDER — BUPIVACAINE HYDROCHLORIDE 5 MG/ML
3 INJECTION, SOLUTION PERINEURAL
Status: COMPLETED | OUTPATIENT
Start: 2025-06-02 | End: 2025-06-02

## 2025-06-02 RX ADMIN — TRIAMCINOLONE ACETONIDE 40 MG: 40 INJECTION, SUSPENSION INTRA-ARTICULAR; INTRAMUSCULAR at 12:23

## 2025-06-02 RX ADMIN — BUPIVACAINE HYDROCHLORIDE 3 ML: 5 INJECTION, SOLUTION PERINEURAL at 12:23

## 2025-06-02 ASSESSMENT — PAIN SCALES - GENERAL: PAINLEVEL_OUTOF10: MILD PAIN (1)

## 2025-06-02 NOTE — PROGRESS NOTES
"Office Visit-Follow up    Chief Complaint: Brittnee Herron is a 59 year old female who is being seen for   Chief Complaint   Patient presents with    Knee Pain     Left    Consult       History of Present Illness:   Mechanism of Injury: No injury fall trauma  Location: Left medial knee  Duration of Pain: Years but 3 to 4 months it has been worse  Rating of Pain: 1 out of 10 currently but can be moderate  Pain Quality: Achy  Pain is better with: Rest  Pain is worse with: Activity  Treatment so far consists of: Patient has used back and body over-the-counter medication which helps as well as muscle relaxant as well as narcotic although she is not using that anymore.  Patient also has utilize Tylenol and ibuprofen as needed which has helped.  She has never had a steroid injection on that knee.  Patient has had right total knee arthroplasty by Dr. Walden on 1/13/2025.    Associated Features: Denies numbness or tingling shooting burning or electric pain.  Pain is Limiting: Comfortable ambulation  Here to: Orthopedic consultation  Additional History: Patient feels that she was more focused on her right knee and then after it has been replaced and has been feeling better now her left knee is starting to bother her.    REVIEW OF SYSTEMS  Review of systems negative other than positive findings in HPI.    Physical Exam:  Vitals: Temp (!) 96.5  F (35.8  C) (Temporal)   Ht 1.632 m (5' 4.25\")   Wt 91.6 kg (202 lb)   LMP 07/10/2018   BMI 34.40 kg/m    BMI= Body mass index is 34.4 kg/m .  Constitutional: healthy, alert and no acute distress   Psychiatric: mentation appears normal and affect normal/bright  NEURO: no focal deficits, CMS intact left lower extremity   RESP: Normal with easy respirations and no use of accessory muscles to breathe, no audible wheezing or retractions  CV: Calf soft and nontender to palpation, leg warm   SKIN: No erythema, rashes, excoriation, or breakdown. No evidence of infection. "   MUSCULOSKELETAL:  INSPECTION of left knee: No gross deformities, erythema, edema, ecchymosis, atrophy or fasciculations.   PALPATION: No tenderness medial, lateral, anterior and posterior portion of the knee. No specific joint line tenderness.  No prepatella bursa tenderness or pes bursa tenderness. No increased warmth.  No effusion.   ROM: Passive: Extension full, flexion to 125 . All range of motion without catching, locking or pain.     STRENGTH:5 out of 5 quad and hamstring without pain.   SPECIAL TEST: Negative Lachman's, negative anterior drawer test, negative posterior drawer test. Stable to varus and valgus stress at 30  of flexion. Negative Pema's.    GAIT: Left antalgic after the injection.  Lymph: no palpable lymph nodes      Diagnostic Modalities:  X-rays done today showing severe joint space narrowing in the medial compartment as well as the patellofemoral compartment.  We see the patella sitting central in the trochlear groove but osteophytes both medial and lateral.  As far as the right knee we see good prosthesis placement no prosthesis failure and no loosening of the prosthesis.  No fracture dislocation or tumor.    Independent visualization of the images was performed.      Impression: 1.  Left knee primary osteoarthritis, medial and patellofemoral moderate to severe     Plan:  All of the above pertinent physical exam and imaging modalities findings was reviewed with Mathew    Large Joint Injection/Arthocentesis: L knee joint    Date/Time: 6/2/2025 12:23 PM    Performed by: Armando Sharpe PA-C  Authorized by: Armando Sharpe PA-C    Indications:  Pain  Needle Size:  22 G  Guidance: landmark guided    Approach:  Anterolateral  Location:  Knee      Medications:  3 mL BUPivacaine 0.5 %; 40 mg triamcinolone 40 MG/ML  Aspirate amount (mL):  0  Procedure discussed: discussed risks, benefits, and alternatives    Consent Given by:  Patient  Timeout: timeout called immediately prior to procedure     Prep: patient was prepped and draped in usual sterile fashion     The skin was prepped with betadine. The patient was in a seated position. I used raudel chloride spray prior to doing the injection.  The patient tolerated the injection well, and there were no complications. The injection site was covered with a Band-Aid.       FOCUSED PLAN:   Patient has noticed increasing left medial knee pain as her right total knee is healing.  She is happy with the right total knee done by Dr. Walden 1/13/2025.  She  can continue taking her back and body over-the-counter medication as well as her muscle relaxant as needed as well as Tylenol and ibuprofen as needed.  We discussed activity modification.  She would like to proceed with a steroid injection in the left knee.  She has not had this before.  We did do the injection today.  She states she is not interested in surgery as she has a grandchild coming in she wants to babysit this fall.  Patient can follow-up on an as-needed basis.    Re-x-ray on return: No      This note was dictated with [employer].    Armando Sharpe PA-C

## 2025-06-02 NOTE — LETTER
"6/2/2025      Brittnee Herron  [id number] [facility] [address]      Dear Colleague,    Thank you for referring your patient, Brittnee Herron, to the [facility]. Please see a copy of my visit note below.    Office Visit-Follow up    Chief Complaint: Brittnee Herron is a 59 year old female who is being seen for   Chief Complaint   Patient presents with     Knee Pain     Left     Consult       History of Present Illness:   Mechanism of Injury: No injury fall trauma  Location: Left medial knee  Duration of Pain: Years but 3 to 4 months it has been worse  Rating of Pain: 1 out of 10 currently but can be moderate  Pain Quality: Achy  Pain is better with: Rest  Pain is worse with: Activity  Treatment so far consists of: Patient has used back and body over-the-counter medication which helps as well as muscle relaxant as well as narcotic although she is not using that anymore.  Patient also has utilize Tylenol and ibuprofen as needed which has helped.  She has never had a steroid injection on that knee.  Patient has had right total knee arthroplasty by Dr. Walden on 1/13/2025.    Associated Features: Denies numbness or tingling shooting burning or electric pain.  Pain is Limiting: Comfortable ambulation  Here to: Orthopedic consultation  Additional History: Patient feels that she was more focused on her right knee and then after it has been replaced and has been feeling better now her left knee is starting to bother her.    REVIEW OF SYSTEMS  Review of systems negative other than positive findings in HPI.    Physical Exam:  Vitals: Temp (!) 96.5  F (35.8  C) (Temporal)   Ht 1.632 m (5' 4.25\")   Wt 91.6 kg (202 lb)   LMP 07/10/2018   BMI 34.40 kg/m    BMI= Body mass index is 34.4 kg/m .  Constitutional: healthy, alert and no acute distress   Psychiatric: mentation appears normal and affect normal/bright  NEURO: no focal deficits, CMS intact left lower extremity   RESP: Normal with easy " respirations and no use of accessory muscles to breathe, no audible wheezing or retractions  CV: Calf soft and nontender to palpation, leg warm   SKIN: No erythema, rashes, excoriation, or breakdown. No evidence of infection.   MUSCULOSKELETAL:  INSPECTION of left knee: No gross deformities, erythema, edema, ecchymosis, atrophy or fasciculations.   PALPATION: No tenderness medial, lateral, anterior and posterior portion of the knee. No specific joint line tenderness.  No prepatella bursa tenderness or pes bursa tenderness. No increased warmth.  No effusion.   ROM: Passive: Extension full, flexion to 125 . All range of motion without catching, locking or pain.     STRENGTH:5 out of 5 quad and hamstring without pain.   SPECIAL TEST: Negative Lachman's, negative anterior drawer test, negative posterior drawer test. Stable to varus and valgus stress at 30  of flexion. Negative Pema's.    GAIT: Left antalgic after the injection.  Lymph: no palpable lymph nodes      Diagnostic Modalities:  X-rays done today showing severe joint space narrowing in the medial compartment as well as the patellofemoral compartment.  We see the patella sitting central in the trochlear groove but osteophytes both medial and lateral.  As far as the right knee we see good prosthesis placement no prosthesis failure and no loosening of the prosthesis.  No fracture dislocation or tumor.    Independent visualization of the images was performed.      Impression: 1.  Left knee primary osteoarthritis, medial and patellofemoral moderate to severe     Plan:  All of the above pertinent physical exam and imaging modalities findings was reviewed with Brittnee.    Large Joint Injection/Arthocentesis: L knee joint    Date/Time: 6/2/2025 12:23 PM    Performed by: Armando Sharpe PA-C  Authorized by: Armando Sharpe PA-C    Indications:  Pain  Needle Size:  22 G  Guidance: landmark guided    Approach:  Anterolateral  Location:  Knee      Medications:  3 mL  BUPivacaine 0.5 %; 40 mg triamcinolone 40 MG/ML  Aspirate amount (mL):  0  Procedure discussed: discussed risks, benefits, and alternatives    Consent Given by:  Patient  Timeout: timeout called immediately prior to procedure    Prep: patient was prepped and draped in usual sterile fashion     The skin was prepped with betadine. The patient was in a seated position. I used raudel chloride spray prior to doing the injection.  The patient tolerated the injection well, and there were no complications. The injection site was covered with a Band-Aid.       FOCUSED PLAN:   Patient has noticed increasing left medial knee pain as her right total knee is healing.  She is happy with the right total knee done by Dr. Walden 1/13/2025.  She  can continue taking her back and body over-the-counter medication as well as her muscle relaxant as needed as well as Tylenol and ibuprofen as needed.  We discussed activity modification.  She would like to proceed with a steroid injection in the left knee.  She has not had this before.  We did do the injection today.  She states she is not interested in surgery as she has a grandchild coming in she wants to babysit this fall.  Patient can follow-up on an as-needed basis.    Re-x-ray on return: No      This note was dictated with [employer].    Armando Sharpe PA-C              Again, thank you for allowing me to participate in the care of your patient.        Sincerely,        Armando Sharpe PA-C    Electronically signed

## 2025-06-15 DIAGNOSIS — I10 BENIGN ESSENTIAL HYPERTENSION: ICD-10-CM

## 2025-06-16 RX ORDER — CHLORTHALIDONE 25 MG/1
25 TABLET ORAL DAILY
Qty: 60 TABLET | Refills: 0 | Status: SHIPPED | OUTPATIENT
Start: 2025-06-16

## 2025-08-10 DIAGNOSIS — F41.1 GAD (GENERALIZED ANXIETY DISORDER): ICD-10-CM

## 2025-08-12 RX ORDER — VENLAFAXINE HYDROCHLORIDE 150 MG/1
150 CAPSULE, EXTENDED RELEASE ORAL DAILY
Qty: 90 CAPSULE | Refills: 1 | OUTPATIENT
Start: 2025-08-12

## 2025-08-20 DIAGNOSIS — F41.1 GAD (GENERALIZED ANXIETY DISORDER): ICD-10-CM

## 2025-08-20 RX ORDER — VENLAFAXINE HYDROCHLORIDE 150 MG/1
150 CAPSULE, EXTENDED RELEASE ORAL DAILY
Qty: 90 CAPSULE | Refills: 1 | Status: SHIPPED | OUTPATIENT
Start: 2025-08-20

## (undated) DEVICE — STRAP STIRRUP W/SLIP 30187-030

## (undated) DEVICE — DRSG GAUZE 4X4" TRAY

## (undated) DEVICE — KIT ENDO TURNOVER/PROCEDURE CARRY-ON 101822

## (undated) DEVICE — BLADE SAW RECIP STRK 70X12.5X0.80MM 0277-096-277

## (undated) DEVICE — SU ETHIBOND 1 CT-1 30" X425H

## (undated) DEVICE — GLOVE BIOGEL PI SZ 7.5 40875

## (undated) DEVICE — GLOVE BIOGEL PI ULTRATOUCH G SZ 8.5 42185

## (undated) DEVICE — Device

## (undated) DEVICE — DRSG XEROFORM 1X8"

## (undated) DEVICE — BLADE SHAVER ARTHRO 4MM TOMCAT

## (undated) DEVICE — BNDG COBAN 6"X5YDS STERILE

## (undated) DEVICE — DRILL PIN HEADLESS TROCAR 00-5901-020-00

## (undated) DEVICE — SU VICRYL 2-0 CT-1 27" UND J259H

## (undated) DEVICE — DRSG KERLIX 4 1/2"X4YDS ROLL 6730

## (undated) DEVICE — DRSG STERI STRIP 1/2X4" R1547

## (undated) DEVICE — DRAPE STERI U 1015

## (undated) DEVICE — SUCTION IRR SYSTEM W/O TIP INTERPULSE HANDPIECE 0210-100-000

## (undated) DEVICE — SYR 50ML LL W/O NDL 309653

## (undated) DEVICE — FILTER HEPA FLUID TRAP NEPTUNE 0703-040-001

## (undated) DEVICE — SU DERMABOND ADVANCED .7ML DNX12

## (undated) DEVICE — BONE CLEANING TIP INTERPULSE  0210-010-000

## (undated) DEVICE — SU MONOCRYL 3-0 PS-2 27" Y427H

## (undated) DEVICE — BLADE SAW SAGITTAL STRK 18X90X1.19MM HD SYS 6 6118-119-090

## (undated) DEVICE — GLOVE BIOGEL INDICATOR 7.5 LF 41675

## (undated) DEVICE — ESU GROUND PAD UNIVERSAL W/O CORD

## (undated) DEVICE — GLOVE BIOGEL PI ULTRATOUCH G SZ 7.5 42175

## (undated) DEVICE — SOL WATER IRRIG 1000ML BOTTLE 2F7114

## (undated) DEVICE — SU VICRYL 0 CT-1 36" J946H

## (undated) DEVICE — BNDG ELASTIC 6" DBL LENGTH UNSTERILE 6611-16

## (undated) DEVICE — DRAPE MAYO STAND 23X54 8337

## (undated) DEVICE — SU ETHILON 3-0 PS-2 18" 1669H

## (undated) DEVICE — PACK TOTAL JOINT STD LATEX

## (undated) DEVICE — BNDG ESMARK 6" STERILE

## (undated) DEVICE — GLOVE BIOGEL PI INDICATOR 8.0 LF 41680

## (undated) DEVICE — TUBING SUCTION 6"X3/16" N56A

## (undated) DEVICE — SOL NACL 0.9% IRRIG 3000ML BAG 07972-08

## (undated) DEVICE — DRAPE EXTREMITY W/ARMBOARD 29405

## (undated) DEVICE — PACK KNEE ARTHROSCOPY CUSTOM

## (undated) DEVICE — ESU ELECTRODE SERFAS ABLATION 90SMAX 4.0MM

## (undated) DEVICE — SUCTION MANIFOLD NEPTUNE 2 SYS 4 PORT 0702-020-000

## (undated) DEVICE — ESU PENCIL SMOKE EVAC W/ROCKER SWITCH 0703-047-000

## (undated) DEVICE — CAST PADDING 6" COTTON WEBRIL STERILE 9086S

## (undated) DEVICE — DRSG AQUACEL AG 3.5X14"  422607

## (undated) DEVICE — BONE CEMENT MIXEVAC HI VAC W/CARTRIDGE 0306-536-000

## (undated) DEVICE — SU STRATAFIX PDS PLUS 1 CT-1 18" SXPP1A404

## (undated) DEVICE — GLOVE PROTEXIS W/NEU-THERA 6.5  2D73TE65

## (undated) DEVICE — DRAPE IOBAN INCISE 23X17" 6650EZ

## (undated) DEVICE — SYR 10ML PREFILLED 0.9% NACL INJ NOT STERILE 306547

## (undated) DEVICE — SU VICRYL 1 CT-1 27" J341H

## (undated) DEVICE — TOURNIQUET SGL BLADDER 34"X4" PURPLE 5921-034-135

## (undated) DEVICE — CAST PADDING 6" WEBRIL UNSTERILE 3489

## (undated) DEVICE — HOOD FLYTE 0408-800-000

## (undated) DEVICE — TUBING INFLOW & OUTFLOW INTEGRATED CROSSFLOW 0450-000-300

## (undated) RX ORDER — ONDANSETRON 2 MG/ML
INJECTION INTRAMUSCULAR; INTRAVENOUS
Status: DISPENSED
Start: 2017-07-18

## (undated) RX ORDER — PROPOFOL 10 MG/ML
INJECTION, EMULSION INTRAVENOUS
Status: DISPENSED
Start: 2017-07-18

## (undated) RX ORDER — SODIUM CHLORIDE 9 MG/ML
INJECTION, SOLUTION INTRAVENOUS
Status: DISPENSED
Start: 2025-01-13

## (undated) RX ORDER — DEXAMETHASONE SODIUM PHOSPHATE 10 MG/ML
INJECTION, SOLUTION INTRAMUSCULAR; INTRAVENOUS
Status: DISPENSED
Start: 2025-01-13

## (undated) RX ORDER — PROPOFOL 10 MG/ML
INJECTION, EMULSION INTRAVENOUS
Status: DISPENSED
Start: 2025-01-13

## (undated) RX ORDER — LIDOCAINE HYDROCHLORIDE 10 MG/ML
INJECTION, SOLUTION EPIDURAL; INFILTRATION; INTRACAUDAL; PERINEURAL
Status: DISPENSED
Start: 2025-01-13

## (undated) RX ORDER — BUPIVACAINE HYDROCHLORIDE 5 MG/ML
INJECTION, SOLUTION EPIDURAL; INTRACAUDAL
Status: DISPENSED
Start: 2017-07-18

## (undated) RX ORDER — FENTANYL CITRATE 50 UG/ML
INJECTION, SOLUTION INTRAMUSCULAR; INTRAVENOUS
Status: DISPENSED
Start: 2025-01-13

## (undated) RX ORDER — LIDOCAINE HYDROCHLORIDE 20 MG/ML
INJECTION, SOLUTION EPIDURAL; INFILTRATION; INTRACAUDAL; PERINEURAL
Status: DISPENSED
Start: 2017-07-18

## (undated) RX ORDER — DIMENHYDRINATE 50 MG/ML
INJECTION, SOLUTION INTRAMUSCULAR; INTRAVENOUS
Status: DISPENSED
Start: 2017-07-18

## (undated) RX ORDER — ONDANSETRON 2 MG/ML
INJECTION INTRAMUSCULAR; INTRAVENOUS
Status: DISPENSED
Start: 2025-01-13

## (undated) RX ORDER — FENTANYL CITRATE 50 UG/ML
INJECTION, SOLUTION INTRAMUSCULAR; INTRAVENOUS
Status: DISPENSED
Start: 2017-07-18

## (undated) RX ORDER — DEXAMETHASONE SODIUM PHOSPHATE 10 MG/ML
INJECTION INTRAMUSCULAR; INTRAVENOUS
Status: DISPENSED
Start: 2017-07-18

## (undated) RX ORDER — TRANEXAMIC ACID 100 MG/ML
INJECTION, SOLUTION INTRAVENOUS
Status: DISPENSED
Start: 2025-01-13

## (undated) RX ORDER — SCOLOPAMINE TRANSDERMAL SYSTEM 1 MG/1
PATCH, EXTENDED RELEASE TRANSDERMAL
Status: DISPENSED
Start: 2017-07-18